# Patient Record
Sex: FEMALE | Race: BLACK OR AFRICAN AMERICAN | Employment: UNEMPLOYED | URBAN - METROPOLITAN AREA
[De-identification: names, ages, dates, MRNs, and addresses within clinical notes are randomized per-mention and may not be internally consistent; named-entity substitution may affect disease eponyms.]

---

## 2017-01-17 ENCOUNTER — HOSPITAL ENCOUNTER (OUTPATIENT)
Dept: LAB | Age: 62
Discharge: HOME | End: 2017-01-17
Payer: MEDICAID

## 2017-01-17 ENCOUNTER — HOSPITAL ENCOUNTER (OUTPATIENT)
Dept: LAB | Age: 62
Discharge: HOME | End: 2017-01-17

## 2017-01-17 DIAGNOSIS — Z01.818 PRE-OP EXAMINATION: ICD-10-CM

## 2017-01-17 LAB — SENTARA SPECIMEN COL,SENBCF: NORMAL

## 2017-01-17 PROCEDURE — 93005 ELECTROCARDIOGRAM TRACING: CPT

## 2017-01-17 PROCEDURE — 99001 SPECIMEN HANDLING PT-LAB: CPT | Performed by: ORTHOPAEDIC SURGERY

## 2017-01-17 NOTE — PERIOP NOTES
PAT - SURGICAL PRE-ADMISSION INSTRUCTIONS    NAME:  Mildred Peñaloza                                                          TODAY'S DATE:  1/17/2017    SURGERY DATE:  2/6/2017                                  SURGERY ARRIVAL TIME: 1030    1. Do NOT eat or drink anything, including candy or gum, after MIDNIGHT on [id number] , unless you have specific instructions from your Surgeon or Anesthesia Provider to do so. 2. No smoking on the day of surgery. 3. No alcohol 24 hours prior to the day of surgery. 4. No recreational drugs for one week prior to the day of surgery. 5. Leave all valuables, including money/purse, at home. 6. Remove all jewelry, nail polish, makeup (including mascara); no lotions, powders, deodorant, or perfume/cologne/after shave. 7. Glasses/Contact lenses and Dentures may be worn to the hospital.  They will be removed prior to surgery. 8. Call your doctor if symptoms of a cold or illness develop within 24 ours prior to surgery. 9. AN ADULT MUST DRIVE YOU HOME AFTER OUTPATIENT SURGERY. 10. If you are having an OUTPATIENT procedure, please make arrangements for a responsible adult to be with you for 24 hours after your surgery. 11. If you are admitted to the hospital, you will be assigned to a bed after surgery is complete. Normally a family member will not be able to see you until you are in your assigned bed. 15. Family is encouraged to accompany you to the hospital.  We ask visitors in the treatment area to be limited to ONE person at a time to ensure patient privacy. EXCEPTIONS WILL BE MADE AS NEEDED. 15. Children under 12 are discouraged from entering the treatment area and need to be supervised by an adult when in the waiting room. Special Instructions:    Bring list of CURRENT medications . , Take these medications the morning of surgery with a sip of water:  BLOOD PRESSURE / HEART MEDS, HOLD oral diabetic medication on the MORNING OF surgery. , HOLD metformin/glucophage dose starting the [address] the day of surgery. , Complete bowel prep per MD instructions., STOP anticoagulants AT LEAST 1 WEEK PRIOR to your surgery or, follow other MD instructions:  BLOOD THINNERS, HEART MEDS    Patient Prep:    use CHG solution    These surgical instructions were reviewed with PATIENT during the PAT PHONE VISIT . A printed copy of the instructions was provided to PATIENT. Directions: On the morning of surgery, please go to the [address] [place] [address]. Enter the building from the [facility] entrance, 1st floor (next to the Emergency Room entrance). Take the elevator to the 2nd floor. Sign in at the Registration Desk.     If you have any questions and/or concerns, please do not hesitate to call:  (Prior to the day of surgery)  [facility] unit:  [phone]  (Day of surgery)  [facility] unit:  [phone]

## 2017-01-18 LAB
ATRIAL RATE: 82 BPM
CALCULATED P AXIS, ECG09: 58 DEGREES
CALCULATED R AXIS, ECG10: -12 DEGREES
CALCULATED T AXIS, ECG11: 23 DEGREES
DIAGNOSIS, 93000: NORMAL
P-R INTERVAL, ECG05: 128 MS
Q-T INTERVAL, ECG07: 362 MS
QRS DURATION, ECG06: 86 MS
QTC CALCULATION (BEZET), ECG08: 422 MS
VENTRICULAR RATE, ECG03: 82 BPM

## 2017-02-03 ENCOUNTER — ANESTHESIA EVENT (OUTPATIENT)
Dept: SURGERY | Age: 62
DRG: 301 | End: 2017-02-03
Payer: MEDICAID

## 2017-02-06 ENCOUNTER — HOSPITAL ENCOUNTER (INPATIENT)
Age: 62
LOS: 2 days | Discharge: NURSING, REHAB OR LONG-TERM CARE | DRG: 301 | End: 2017-02-08
Attending: ORTHOPAEDIC SURGERY | Admitting: ORTHOPAEDIC SURGERY
Payer: MEDICAID

## 2017-02-06 ENCOUNTER — APPOINTMENT (OUTPATIENT)
Dept: GENERAL RADIOLOGY | Age: 62
DRG: 301 | End: 2017-02-06
Attending: ORTHOPAEDIC SURGERY
Payer: MEDICAID

## 2017-02-06 ENCOUNTER — ANESTHESIA (OUTPATIENT)
Dept: SURGERY | Age: 62
DRG: 301 | End: 2017-02-06
Payer: MEDICAID

## 2017-02-06 PROBLEM — M16.12 OSTEOARTHRITIS OF LEFT HIP: Status: ACTIVE | Noted: 2017-02-06

## 2017-02-06 PROBLEM — E78.5 HYPERLIPIDEMIA: Chronic | Status: ACTIVE | Noted: 2017-02-06

## 2017-02-06 PROBLEM — I10 HTN (HYPERTENSION): Chronic | Status: ACTIVE | Noted: 2017-02-06

## 2017-02-06 PROBLEM — F32.A DEPRESSION: Chronic | Status: ACTIVE | Noted: 2017-02-06

## 2017-02-06 PROBLEM — E11.9 TYPE 2 DIABETES MELLITUS (HCC): Chronic | Status: ACTIVE | Noted: 2017-02-06

## 2017-02-06 PROBLEM — J45.909 ASTHMA: Chronic | Status: ACTIVE | Noted: 2017-02-06

## 2017-02-06 LAB
ABO + RH BLD: NORMAL
ANION GAP BLD CALC-SCNC: 10 MMOL/L (ref 3–18)
APTT PPP: 27.9 SEC (ref 23–36.4)
BASOPHILS # BLD AUTO: 0.1 K/UL (ref 0–0.06)
BASOPHILS # BLD: 0 % (ref 0–2)
BLOOD GROUP ANTIBODIES SERPL: NORMAL
BUN SERPL-MCNC: 17 MG/DL (ref 7–18)
BUN/CREAT SERPL: 24 (ref 12–20)
CALCIUM SERPL-MCNC: 10.2 MG/DL (ref 8.5–10.1)
CHLORIDE SERPL-SCNC: 108 MMOL/L (ref 100–108)
CO2 SERPL-SCNC: 25 MMOL/L (ref 21–32)
CREAT SERPL-MCNC: 0.71 MG/DL (ref 0.6–1.3)
DIFFERENTIAL METHOD BLD: ABNORMAL
EOSINOPHIL # BLD: 0.8 K/UL (ref 0–0.4)
EOSINOPHIL NFR BLD: 5 % (ref 0–5)
ERYTHROCYTE [DISTWIDTH] IN BLOOD BY AUTOMATED COUNT: 14.4 % (ref 11.6–14.5)
EST. AVERAGE GLUCOSE BLD GHB EST-MCNC: 140 MG/DL
GLUCOSE BLD STRIP.AUTO-MCNC: 126 MG/DL (ref 70–110)
GLUCOSE BLD STRIP.AUTO-MCNC: 133 MG/DL (ref 70–110)
GLUCOSE BLD STRIP.AUTO-MCNC: 147 MG/DL (ref 70–110)
GLUCOSE SERPL-MCNC: 108 MG/DL (ref 74–99)
HBA1C MFR BLD: 6.5 % (ref 4.2–5.6)
HCT VFR BLD AUTO: 38.3 % (ref 35–45)
HGB BLD-MCNC: 12.4 G/DL (ref 12–16)
INR PPP: 0.9 (ref 0.8–1.2)
LYMPHOCYTES # BLD AUTO: 13 % (ref 21–52)
LYMPHOCYTES # BLD: 2 K/UL (ref 0.9–3.6)
MCH RBC QN AUTO: 30.4 PG (ref 24–34)
MCHC RBC AUTO-ENTMCNC: 32.4 G/DL (ref 31–37)
MCV RBC AUTO: 93.9 FL (ref 74–97)
MONOCYTES # BLD: 0.5 K/UL (ref 0.05–1.2)
MONOCYTES NFR BLD AUTO: 3 % (ref 3–10)
NEUTS SEG # BLD: 12.8 K/UL (ref 1.8–8)
NEUTS SEG NFR BLD AUTO: 79 % (ref 40–73)
PLATELET # BLD AUTO: 296 K/UL (ref 135–420)
PMV BLD AUTO: 10.9 FL (ref 9.2–11.8)
POTASSIUM SERPL-SCNC: 4.2 MMOL/L (ref 3.5–5.5)
PROTHROMBIN TIME: 12.2 SEC (ref 11.5–15.2)
RBC # BLD AUTO: 4.08 M/UL (ref 4.2–5.3)
SODIUM SERPL-SCNC: 143 MMOL/L (ref 136–145)
SPECIMEN EXP DATE BLD: NORMAL
WBC # BLD AUTO: 16.1 K/UL (ref 4.6–13.2)

## 2017-02-06 PROCEDURE — C1776 JOINT DEVICE (IMPLANTABLE): HCPCS | Performed by: ORTHOPAEDIC SURGERY

## 2017-02-06 PROCEDURE — 77030020788: Performed by: ORTHOPAEDIC SURGERY

## 2017-02-06 PROCEDURE — 76060000035 HC ANESTHESIA 2 TO 2.5 HR: Performed by: ORTHOPAEDIC SURGERY

## 2017-02-06 PROCEDURE — 74011000258 HC RX REV CODE- 258

## 2017-02-06 PROCEDURE — 77030008462 HC STPLR SKN PROX J&J -A: Performed by: ORTHOPAEDIC SURGERY

## 2017-02-06 PROCEDURE — 74011250636 HC RX REV CODE- 250/636: Performed by: ORTHOPAEDIC SURGERY

## 2017-02-06 PROCEDURE — 85025 COMPLETE CBC W/AUTO DIFF WBC: CPT | Performed by: ORTHOPAEDIC SURGERY

## 2017-02-06 PROCEDURE — 86900 BLOOD TYPING SEROLOGIC ABO: CPT | Performed by: ORTHOPAEDIC SURGERY

## 2017-02-06 PROCEDURE — 74011250637 HC RX REV CODE- 250/637: Performed by: ORTHOPAEDIC SURGERY

## 2017-02-06 PROCEDURE — 76210000016 HC OR PH I REC 1 TO 1.5 HR: Performed by: ORTHOPAEDIC SURGERY

## 2017-02-06 PROCEDURE — 74011250636 HC RX REV CODE- 250/636: Performed by: ANESTHESIOLOGY

## 2017-02-06 PROCEDURE — 77030032490 HC SLV COMPR SCD KNE COVD -B: Performed by: ORTHOPAEDIC SURGERY

## 2017-02-06 PROCEDURE — 74011250636 HC RX REV CODE- 250/636: Performed by: NURSE ANESTHETIST, CERTIFIED REGISTERED

## 2017-02-06 PROCEDURE — 85730 THROMBOPLASTIN TIME PARTIAL: CPT | Performed by: ORTHOPAEDIC SURGERY

## 2017-02-06 PROCEDURE — 77030018836 HC SOL IRR NACL ICUM -A: Performed by: ORTHOPAEDIC SURGERY

## 2017-02-06 PROCEDURE — 74011000250 HC RX REV CODE- 250

## 2017-02-06 PROCEDURE — 77030013708 HC HNDPC SUC IRR PULS STRY –B: Performed by: ORTHOPAEDIC SURGERY

## 2017-02-06 PROCEDURE — 74011000250 HC RX REV CODE- 250: Performed by: ORTHOPAEDIC SURGERY

## 2017-02-06 PROCEDURE — 80048 BASIC METABOLIC PNL TOTAL CA: CPT | Performed by: ORTHOPAEDIC SURGERY

## 2017-02-06 PROCEDURE — 74011250637 HC RX REV CODE- 250/637

## 2017-02-06 PROCEDURE — 77030013079 HC BLNKT BAIR HGGR 3M -A: Performed by: ANESTHESIOLOGY

## 2017-02-06 PROCEDURE — 77030019908 HC STETH ESOPH SIMS -A: Performed by: ANESTHESIOLOGY

## 2017-02-06 PROCEDURE — 77030018883 HC BLD SAW SAG4 STRY -B: Performed by: ORTHOPAEDIC SURGERY

## 2017-02-06 PROCEDURE — 83036 HEMOGLOBIN GLYCOSYLATED A1C: CPT | Performed by: PHYSICIAN ASSISTANT

## 2017-02-06 PROCEDURE — 74011250636 HC RX REV CODE- 250/636

## 2017-02-06 PROCEDURE — 77030016547 HC BLD SAW SAG1 STRY -B: Performed by: ORTHOPAEDIC SURGERY

## 2017-02-06 PROCEDURE — 77030029684 HC NEB SM VOL KT MONA -A

## 2017-02-06 PROCEDURE — 65270000029 HC RM PRIVATE

## 2017-02-06 PROCEDURE — 77030027138 HC INCENT SPIROMETER -A

## 2017-02-06 PROCEDURE — 36415 COLL VENOUS BLD VENIPUNCTURE: CPT | Performed by: ORTHOPAEDIC SURGERY

## 2017-02-06 PROCEDURE — 74011250637 HC RX REV CODE- 250/637: Performed by: NURSE ANESTHETIST, CERTIFIED REGISTERED

## 2017-02-06 PROCEDURE — 77030008683 HC TU ET CUF COVD -A: Performed by: ANESTHESIOLOGY

## 2017-02-06 PROCEDURE — 85610 PROTHROMBIN TIME: CPT | Performed by: ORTHOPAEDIC SURGERY

## 2017-02-06 PROCEDURE — 82962 GLUCOSE BLOOD TEST: CPT

## 2017-02-06 PROCEDURE — 74011250637 HC RX REV CODE- 250/637: Performed by: PHYSICIAN ASSISTANT

## 2017-02-06 PROCEDURE — 77030019880 HC ABD PLLW HIP DJOR -B: Performed by: ORTHOPAEDIC SURGERY

## 2017-02-06 PROCEDURE — 77030003029 HC SUT VCRL J&J -B: Performed by: ORTHOPAEDIC SURGERY

## 2017-02-06 PROCEDURE — 77010033678 HC OXYGEN DAILY

## 2017-02-06 PROCEDURE — 72170 X-RAY EXAM OF PELVIS: CPT

## 2017-02-06 PROCEDURE — 77030008477 HC STYL SATN SLP COVD -A: Performed by: ANESTHESIOLOGY

## 2017-02-06 PROCEDURE — 74011000250 HC RX REV CODE- 250: Performed by: PHYSICIAN ASSISTANT

## 2017-02-06 PROCEDURE — 76010000171 HC OR TIME 2 TO 2.5 HR INTENSV-TIER 1: Performed by: ORTHOPAEDIC SURGERY

## 2017-02-06 PROCEDURE — 77030020255 HC SOL INJ LR 1000ML BG

## 2017-02-06 PROCEDURE — 0SRB0JZ REPLACEMENT OF LEFT HIP JOINT WITH SYNTHETIC SUBSTITUTE, OPEN APPROACH: ICD-10-PCS | Performed by: ORTHOPAEDIC SURGERY

## 2017-02-06 DEVICE — 127 DEGREE NECK ANGLE HIP STEM
Type: IMPLANTABLE DEVICE | Site: HIP | Status: FUNCTIONAL
Brand: ACCOLADE

## 2017-02-06 DEVICE — CERAMIC V40 FEMORAL HEAD
Type: IMPLANTABLE DEVICE | Site: HIP | Status: FUNCTIONAL
Brand: BIOLOX

## 2017-02-06 DEVICE — 0 DEGREE POLYETHYLENE INSERT
Type: IMPLANTABLE DEVICE | Site: HIP | Status: FUNCTIONAL
Brand: TRIDENT

## 2017-02-06 DEVICE — COMPONENT HIP PRSS FT MTL ON CERM POLYETH X3: Type: IMPLANTABLE DEVICE | Site: HIP | Status: FUNCTIONAL

## 2017-02-06 DEVICE — HEMISPHERICAL CLUSTER HOLE SHELL
Type: IMPLANTABLE DEVICE | Site: HIP | Status: FUNCTIONAL
Brand: TRITANIUM

## 2017-02-06 DEVICE — CANCELLOUS BONE SCREW
Type: IMPLANTABLE DEVICE | Site: HIP | Status: FUNCTIONAL
Brand: TORX

## 2017-02-06 RX ORDER — ALBUTEROL SULFATE 0.83 MG/ML
2.5 SOLUTION RESPIRATORY (INHALATION)
Status: COMPLETED | OUTPATIENT
Start: 2017-02-06 | End: 2017-02-06

## 2017-02-06 RX ORDER — CEFAZOLIN SODIUM 2 G/50ML
2 SOLUTION INTRAVENOUS
Status: COMPLETED | OUTPATIENT
Start: 2017-02-06 | End: 2017-02-06

## 2017-02-06 RX ORDER — FENTANYL CITRATE 50 UG/ML
25 INJECTION, SOLUTION INTRAMUSCULAR; INTRAVENOUS AS NEEDED
Status: DISCONTINUED | OUTPATIENT
Start: 2017-02-06 | End: 2017-02-06 | Stop reason: HOSPADM

## 2017-02-06 RX ORDER — HYDROMORPHONE HYDROCHLORIDE 1 MG/ML
INJECTION, SOLUTION INTRAMUSCULAR; INTRAVENOUS; SUBCUTANEOUS AS NEEDED
Status: DISCONTINUED | OUTPATIENT
Start: 2017-02-06 | End: 2017-02-06 | Stop reason: HOSPADM

## 2017-02-06 RX ORDER — SERTRALINE HYDROCHLORIDE 50 MG/1
100 TABLET, FILM COATED ORAL DAILY
Status: DISCONTINUED | OUTPATIENT
Start: 2017-02-07 | End: 2017-02-08 | Stop reason: HOSPADM

## 2017-02-06 RX ORDER — DIPHENHYDRAMINE HCL 25 MG
25 CAPSULE ORAL
Status: DISCONTINUED | OUTPATIENT
Start: 2017-02-06 | End: 2017-02-08 | Stop reason: HOSPADM

## 2017-02-06 RX ORDER — ACETAMINOPHEN 325 MG/1
650 TABLET ORAL
Status: DISCONTINUED | OUTPATIENT
Start: 2017-02-06 | End: 2017-02-08 | Stop reason: HOSPADM

## 2017-02-06 RX ORDER — OXYCODONE AND ACETAMINOPHEN 5; 325 MG/1; MG/1
1-2 TABLET ORAL
Status: DISCONTINUED | OUTPATIENT
Start: 2017-02-06 | End: 2017-02-08 | Stop reason: HOSPADM

## 2017-02-06 RX ORDER — NEOSTIGMINE METHYLSULFATE 5 MG/5 ML
SYRINGE (ML) INTRAVENOUS AS NEEDED
Status: DISCONTINUED | OUTPATIENT
Start: 2017-02-06 | End: 2017-02-06 | Stop reason: HOSPADM

## 2017-02-06 RX ORDER — GLYCOPYRROLATE 0.2 MG/ML
INJECTION INTRAMUSCULAR; INTRAVENOUS AS NEEDED
Status: DISCONTINUED | OUTPATIENT
Start: 2017-02-06 | End: 2017-02-06 | Stop reason: HOSPADM

## 2017-02-06 RX ORDER — ROCURONIUM BROMIDE 10 MG/ML
INJECTION, SOLUTION INTRAVENOUS AS NEEDED
Status: DISCONTINUED | OUTPATIENT
Start: 2017-02-06 | End: 2017-02-06 | Stop reason: HOSPADM

## 2017-02-06 RX ORDER — LABETALOL HCL 20 MG/4 ML
SYRINGE (ML) INTRAVENOUS AS NEEDED
Status: DISCONTINUED | OUTPATIENT
Start: 2017-02-06 | End: 2017-02-06 | Stop reason: HOSPADM

## 2017-02-06 RX ORDER — BISACODYL 5 MG
5 TABLET, DELAYED RELEASE (ENTERIC COATED) ORAL DAILY PRN
Status: DISCONTINUED | OUTPATIENT
Start: 2017-02-06 | End: 2017-02-08 | Stop reason: HOSPADM

## 2017-02-06 RX ORDER — FAMOTIDINE 20 MG/1
20 TABLET, FILM COATED ORAL ONCE
Status: COMPLETED | OUTPATIENT
Start: 2017-02-06 | End: 2017-02-06

## 2017-02-06 RX ORDER — SODIUM CHLORIDE, SODIUM LACTATE, POTASSIUM CHLORIDE, CALCIUM CHLORIDE 600; 310; 30; 20 MG/100ML; MG/100ML; MG/100ML; MG/100ML
50 INJECTION, SOLUTION INTRAVENOUS CONTINUOUS
Status: DISCONTINUED | OUTPATIENT
Start: 2017-02-06 | End: 2017-02-06 | Stop reason: HOSPADM

## 2017-02-06 RX ORDER — MORPHINE SULFATE 2 MG/ML
2 INJECTION, SOLUTION INTRAMUSCULAR; INTRAVENOUS
Status: DISCONTINUED | OUTPATIENT
Start: 2017-02-06 | End: 2017-02-08 | Stop reason: HOSPADM

## 2017-02-06 RX ORDER — SIMVASTATIN 20 MG/1
20 TABLET, FILM COATED ORAL
Status: DISCONTINUED | OUTPATIENT
Start: 2017-02-06 | End: 2017-02-08 | Stop reason: HOSPADM

## 2017-02-06 RX ORDER — AMLODIPINE BESYLATE 5 MG/1
5 TABLET ORAL DAILY
Status: DISCONTINUED | OUTPATIENT
Start: 2017-02-07 | End: 2017-02-08 | Stop reason: HOSPADM

## 2017-02-06 RX ORDER — CEFAZOLIN SODIUM 2 G/50ML
SOLUTION INTRAVENOUS
Status: COMPLETED
Start: 2017-02-06 | End: 2017-02-06

## 2017-02-06 RX ORDER — SODIUM CHLORIDE 0.9 % (FLUSH) 0.9 %
5-10 SYRINGE (ML) INJECTION AS NEEDED
Status: DISCONTINUED | OUTPATIENT
Start: 2017-02-06 | End: 2017-02-06 | Stop reason: HOSPADM

## 2017-02-06 RX ORDER — IPRATROPIUM BROMIDE AND ALBUTEROL SULFATE 2.5; .5 MG/3ML; MG/3ML
3 SOLUTION RESPIRATORY (INHALATION)
Status: DISCONTINUED | OUTPATIENT
Start: 2017-02-06 | End: 2017-02-07

## 2017-02-06 RX ORDER — ALBUTEROL SULFATE 90 UG/1
2 AEROSOL, METERED RESPIRATORY (INHALATION)
COMMUNITY

## 2017-02-06 RX ORDER — BUSPIRONE HYDROCHLORIDE 5 MG/1
30 TABLET ORAL DAILY
Status: DISCONTINUED | OUTPATIENT
Start: 2017-02-07 | End: 2017-02-08 | Stop reason: HOSPADM

## 2017-02-06 RX ORDER — NALOXONE HYDROCHLORIDE 0.4 MG/ML
0.4 INJECTION, SOLUTION INTRAMUSCULAR; INTRAVENOUS; SUBCUTANEOUS AS NEEDED
Status: DISCONTINUED | OUTPATIENT
Start: 2017-02-06 | End: 2017-02-08 | Stop reason: HOSPADM

## 2017-02-06 RX ORDER — ONDANSETRON 2 MG/ML
4 INJECTION INTRAMUSCULAR; INTRAVENOUS
Status: DISCONTINUED | OUTPATIENT
Start: 2017-02-06 | End: 2017-02-08 | Stop reason: HOSPADM

## 2017-02-06 RX ORDER — LIDOCAINE HYDROCHLORIDE 20 MG/ML
INJECTION, SOLUTION EPIDURAL; INFILTRATION; INTRACAUDAL; PERINEURAL AS NEEDED
Status: DISCONTINUED | OUTPATIENT
Start: 2017-02-06 | End: 2017-02-06 | Stop reason: HOSPADM

## 2017-02-06 RX ORDER — FENTANYL CITRATE 50 UG/ML
INJECTION, SOLUTION INTRAMUSCULAR; INTRAVENOUS AS NEEDED
Status: DISCONTINUED | OUTPATIENT
Start: 2017-02-06 | End: 2017-02-06 | Stop reason: HOSPADM

## 2017-02-06 RX ORDER — BUDESONIDE 0.5 MG/2ML
500 INHALANT ORAL
Status: DISCONTINUED | OUTPATIENT
Start: 2017-02-06 | End: 2017-02-08 | Stop reason: HOSPADM

## 2017-02-06 RX ORDER — PERPHENAZINE 2 MG/1
4 TABLET, FILM COATED ORAL 2 TIMES DAILY
Status: DISCONTINUED | OUTPATIENT
Start: 2017-02-06 | End: 2017-02-08 | Stop reason: HOSPADM

## 2017-02-06 RX ORDER — CELECOXIB 100 MG/1
200 CAPSULE ORAL 2 TIMES DAILY
Status: DISCONTINUED | OUTPATIENT
Start: 2017-02-06 | End: 2017-02-08 | Stop reason: HOSPADM

## 2017-02-06 RX ORDER — INSULIN LISPRO 100 [IU]/ML
INJECTION, SOLUTION INTRAVENOUS; SUBCUTANEOUS ONCE
Status: DISCONTINUED | OUTPATIENT
Start: 2017-02-06 | End: 2017-02-06 | Stop reason: HOSPADM

## 2017-02-06 RX ORDER — MORPHINE SULFATE 10 MG/ML
INJECTION, SOLUTION INTRAMUSCULAR; INTRAVENOUS
Status: COMPLETED
Start: 2017-02-06 | End: 2017-02-06

## 2017-02-06 RX ORDER — HYDROMORPHONE HYDROCHLORIDE 2 MG/ML
0.5 INJECTION, SOLUTION INTRAMUSCULAR; INTRAVENOUS; SUBCUTANEOUS
Status: DISCONTINUED | OUTPATIENT
Start: 2017-02-06 | End: 2017-02-06 | Stop reason: HOSPADM

## 2017-02-06 RX ORDER — SODIUM CHLORIDE 0.9 % (FLUSH) 0.9 %
5-10 SYRINGE (ML) INJECTION AS NEEDED
Status: DISCONTINUED | OUTPATIENT
Start: 2017-02-06 | End: 2017-02-08 | Stop reason: HOSPADM

## 2017-02-06 RX ORDER — DOCUSATE SODIUM 100 MG/1
100 CAPSULE, LIQUID FILLED ORAL 2 TIMES DAILY
Status: DISCONTINUED | OUTPATIENT
Start: 2017-02-06 | End: 2017-02-08 | Stop reason: HOSPADM

## 2017-02-06 RX ORDER — SUCCINYLCHOLINE CHLORIDE 20 MG/ML
INJECTION INTRAMUSCULAR; INTRAVENOUS AS NEEDED
Status: DISCONTINUED | OUTPATIENT
Start: 2017-02-06 | End: 2017-02-06 | Stop reason: HOSPADM

## 2017-02-06 RX ORDER — SODIUM CHLORIDE, SODIUM LACTATE, POTASSIUM CHLORIDE, CALCIUM CHLORIDE 600; 310; 30; 20 MG/100ML; MG/100ML; MG/100ML; MG/100ML
75 INJECTION, SOLUTION INTRAVENOUS CONTINUOUS
Status: DISPENSED | OUTPATIENT
Start: 2017-02-06 | End: 2017-02-07

## 2017-02-06 RX ORDER — ALBUTEROL SULFATE 0.83 MG/ML
SOLUTION RESPIRATORY (INHALATION)
Status: COMPLETED
Start: 2017-02-06 | End: 2017-02-06

## 2017-02-06 RX ORDER — MAGNESIUM SULFATE 100 %
4 CRYSTALS MISCELLANEOUS AS NEEDED
Status: DISCONTINUED | OUTPATIENT
Start: 2017-02-06 | End: 2017-02-08 | Stop reason: HOSPADM

## 2017-02-06 RX ORDER — DIPHENHYDRAMINE HYDROCHLORIDE 50 MG/ML
12.5 INJECTION, SOLUTION INTRAMUSCULAR; INTRAVENOUS
Status: DISCONTINUED | OUTPATIENT
Start: 2017-02-06 | End: 2017-02-08 | Stop reason: HOSPADM

## 2017-02-06 RX ORDER — THEOPHYLLINE 300 MG/1
300 TABLET, EXTENDED RELEASE ORAL 2 TIMES DAILY
Status: DISCONTINUED | OUTPATIENT
Start: 2017-02-06 | End: 2017-02-07 | Stop reason: SDUPTHER

## 2017-02-06 RX ORDER — ONDANSETRON 2 MG/ML
4 INJECTION INTRAMUSCULAR; INTRAVENOUS ONCE
Status: DISCONTINUED | OUTPATIENT
Start: 2017-02-06 | End: 2017-02-06 | Stop reason: HOSPADM

## 2017-02-06 RX ORDER — ENOXAPARIN SODIUM 100 MG/ML
40 INJECTION SUBCUTANEOUS DAILY
Status: DISCONTINUED | OUTPATIENT
Start: 2017-02-06 | End: 2017-02-08 | Stop reason: HOSPADM

## 2017-02-06 RX ORDER — BUPIVACAINE HYDROCHLORIDE AND EPINEPHRINE 2.5; 5 MG/ML; UG/ML
INJECTION, SOLUTION EPIDURAL; INFILTRATION; INTRACAUDAL; PERINEURAL AS NEEDED
Status: DISCONTINUED | OUTPATIENT
Start: 2017-02-06 | End: 2017-02-06 | Stop reason: HOSPADM

## 2017-02-06 RX ORDER — CEFAZOLIN SODIUM 2 G/50ML
2 SOLUTION INTRAVENOUS EVERY 8 HOURS
Status: DISPENSED | OUTPATIENT
Start: 2017-02-06 | End: 2017-02-07

## 2017-02-06 RX ORDER — DEXTROSE 50 % IN WATER (D50W) INTRAVENOUS SYRINGE
25-50 AS NEEDED
Status: DISCONTINUED | OUTPATIENT
Start: 2017-02-06 | End: 2017-02-06 | Stop reason: HOSPADM

## 2017-02-06 RX ORDER — ARFORMOTEROL TARTRATE 15 UG/2ML
2 SOLUTION RESPIRATORY (INHALATION) EVERY 12 HOURS
Status: DISCONTINUED | OUTPATIENT
Start: 2017-02-06 | End: 2017-02-08 | Stop reason: HOSPADM

## 2017-02-06 RX ORDER — ONDANSETRON 2 MG/ML
INJECTION INTRAMUSCULAR; INTRAVENOUS AS NEEDED
Status: DISCONTINUED | OUTPATIENT
Start: 2017-02-06 | End: 2017-02-06 | Stop reason: HOSPADM

## 2017-02-06 RX ORDER — SODIUM CHLORIDE, SODIUM LACTATE, POTASSIUM CHLORIDE, CALCIUM CHLORIDE 600; 310; 30; 20 MG/100ML; MG/100ML; MG/100ML; MG/100ML
25 INJECTION, SOLUTION INTRAVENOUS CONTINUOUS
Status: DISCONTINUED | OUTPATIENT
Start: 2017-02-06 | End: 2017-02-06 | Stop reason: HOSPADM

## 2017-02-06 RX ORDER — SODIUM CHLORIDE 0.9 % (FLUSH) 0.9 %
5-10 SYRINGE (ML) INJECTION EVERY 8 HOURS
Status: DISCONTINUED | OUTPATIENT
Start: 2017-02-06 | End: 2017-02-08 | Stop reason: HOSPADM

## 2017-02-06 RX ORDER — MIDAZOLAM HYDROCHLORIDE 1 MG/ML
INJECTION, SOLUTION INTRAMUSCULAR; INTRAVENOUS AS NEEDED
Status: DISCONTINUED | OUTPATIENT
Start: 2017-02-06 | End: 2017-02-06 | Stop reason: HOSPADM

## 2017-02-06 RX ORDER — INSULIN LISPRO 100 [IU]/ML
INJECTION, SOLUTION INTRAVENOUS; SUBCUTANEOUS
Status: DISCONTINUED | OUTPATIENT
Start: 2017-02-06 | End: 2017-02-08 | Stop reason: HOSPADM

## 2017-02-06 RX ORDER — MAGNESIUM SULFATE 100 %
4 CRYSTALS MISCELLANEOUS AS NEEDED
Status: DISCONTINUED | OUTPATIENT
Start: 2017-02-06 | End: 2017-02-06 | Stop reason: HOSPADM

## 2017-02-06 RX ORDER — ALLOPURINOL 100 MG/1
100 TABLET ORAL DAILY
Status: DISCONTINUED | OUTPATIENT
Start: 2017-02-07 | End: 2017-02-08 | Stop reason: HOSPADM

## 2017-02-06 RX ORDER — MORPHINE SULFATE 2 MG/ML
2 INJECTION, SOLUTION INTRAMUSCULAR; INTRAVENOUS
Status: DISCONTINUED | OUTPATIENT
Start: 2017-02-06 | End: 2017-02-06 | Stop reason: HOSPADM

## 2017-02-06 RX ORDER — PROPOFOL 10 MG/ML
INJECTION, EMULSION INTRAVENOUS AS NEEDED
Status: DISCONTINUED | OUTPATIENT
Start: 2017-02-06 | End: 2017-02-06 | Stop reason: HOSPADM

## 2017-02-06 RX ORDER — DEXTROSE 50 % IN WATER (D50W) INTRAVENOUS SYRINGE
25-50 AS NEEDED
Status: DISCONTINUED | OUTPATIENT
Start: 2017-02-06 | End: 2017-02-08 | Stop reason: HOSPADM

## 2017-02-06 RX ORDER — PERPHENAZINE 4 MG/1
4 TABLET, FILM COATED ORAL 2 TIMES DAILY
Status: DISCONTINUED | OUTPATIENT
Start: 2017-02-06 | End: 2017-02-06

## 2017-02-06 RX ORDER — ZOLPIDEM TARTRATE 5 MG/1
5 TABLET ORAL
Status: DISCONTINUED | OUTPATIENT
Start: 2017-02-06 | End: 2017-02-08 | Stop reason: HOSPADM

## 2017-02-06 RX ORDER — ALBUTEROL SULFATE 90 UG/1
AEROSOL, METERED RESPIRATORY (INHALATION) AS NEEDED
Status: DISCONTINUED | OUTPATIENT
Start: 2017-02-06 | End: 2017-02-06 | Stop reason: HOSPADM

## 2017-02-06 RX ORDER — RANITIDINE 150 MG/1
150 TABLET, FILM COATED ORAL 2 TIMES DAILY
Status: DISCONTINUED | OUTPATIENT
Start: 2017-02-06 | End: 2017-02-08 | Stop reason: HOSPADM

## 2017-02-06 RX ADMIN — MORPHINE SULFATE 2 MG: 2 INJECTION, SOLUTION INTRAMUSCULAR; INTRAVENOUS at 12:23

## 2017-02-06 RX ADMIN — MORPHINE SULFATE 2 MG: 2 INJECTION, SOLUTION INTRAMUSCULAR; INTRAVENOUS at 21:36

## 2017-02-06 RX ADMIN — SODIUM CHLORIDE, SODIUM LACTATE, POTASSIUM CHLORIDE, AND CALCIUM CHLORIDE 75 ML/HR: 600; 310; 30; 20 INJECTION, SOLUTION INTRAVENOUS at 20:22

## 2017-02-06 RX ADMIN — ALBUTEROL SULFATE 2.5 MG: 0.83 SOLUTION RESPIRATORY (INHALATION) at 16:44

## 2017-02-06 RX ADMIN — ENOXAPARIN SODIUM 40 MG: 40 INJECTION SUBCUTANEOUS at 22:31

## 2017-02-06 RX ADMIN — RANITIDINE 150 MG: 150 TABLET, FILM COATED ORAL at 18:45

## 2017-02-06 RX ADMIN — HYDROMORPHONE HYDROCHLORIDE 1 MG: 1 INJECTION, SOLUTION INTRAMUSCULAR; INTRAVENOUS; SUBCUTANEOUS at 14:24

## 2017-02-06 RX ADMIN — MIDAZOLAM HYDROCHLORIDE 2 MG: 1 INJECTION, SOLUTION INTRAMUSCULAR; INTRAVENOUS at 13:50

## 2017-02-06 RX ADMIN — OXYCODONE HYDROCHLORIDE AND ACETAMINOPHEN 2 TABLET: 5; 325 TABLET ORAL at 18:45

## 2017-02-06 RX ADMIN — ARFORMOTEROL TARTRATE 15 MCG: 15 SOLUTION RESPIRATORY (INHALATION) at 21:01

## 2017-02-06 RX ADMIN — ONDANSETRON 4 MG: 2 INJECTION INTRAMUSCULAR; INTRAVENOUS at 15:39

## 2017-02-06 RX ADMIN — Medication 3 MG: at 15:50

## 2017-02-06 RX ADMIN — SIMVASTATIN 20 MG: 20 TABLET, FILM COATED ORAL at 22:23

## 2017-02-06 RX ADMIN — Medication 10 ML: at 18:51

## 2017-02-06 RX ADMIN — PROPOFOL 180 MG: 10 INJECTION, EMULSION INTRAVENOUS at 13:57

## 2017-02-06 RX ADMIN — FENTANYL CITRATE 50 MCG: 50 INJECTION, SOLUTION INTRAMUSCULAR; INTRAVENOUS at 14:27

## 2017-02-06 RX ADMIN — MORPHINE SULFATE 2 MG: 10 INJECTION INTRAMUSCULAR; INTRAVENOUS; SUBCUTANEOUS at 12:33

## 2017-02-06 RX ADMIN — FAMOTIDINE 20 MG: 20 TABLET ORAL at 11:50

## 2017-02-06 RX ADMIN — FENTANYL CITRATE 25 MCG: 50 INJECTION, SOLUTION INTRAMUSCULAR; INTRAVENOUS at 16:52

## 2017-02-06 RX ADMIN — ROCURONIUM BROMIDE 45 MG: 10 INJECTION, SOLUTION INTRAVENOUS at 14:03

## 2017-02-06 RX ADMIN — BUDESONIDE 500 MCG: 0.5 INHALANT RESPIRATORY (INHALATION) at 21:01

## 2017-02-06 RX ADMIN — ALBUTEROL SULFATE 2.5 MG: 2.5 SOLUTION RESPIRATORY (INHALATION) at 16:44

## 2017-02-06 RX ADMIN — ROCURONIUM BROMIDE 5 MG: 10 INJECTION, SOLUTION INTRAVENOUS at 13:57

## 2017-02-06 RX ADMIN — FENTANYL CITRATE 50 MCG: 50 INJECTION, SOLUTION INTRAMUSCULAR; INTRAVENOUS at 15:14

## 2017-02-06 RX ADMIN — CELECOXIB 200 MG: 100 CAPSULE ORAL at 18:45

## 2017-02-06 RX ADMIN — SUCCINYLCHOLINE CHLORIDE 100 MG: 20 INJECTION INTRAMUSCULAR; INTRAVENOUS at 13:45

## 2017-02-06 RX ADMIN — GLYCOPYRROLATE 0.4 MG: 0.2 INJECTION INTRAMUSCULAR; INTRAVENOUS at 15:50

## 2017-02-06 RX ADMIN — DOCUSATE SODIUM 100 MG: 100 CAPSULE, LIQUID FILLED ORAL at 18:45

## 2017-02-06 RX ADMIN — ALBUTEROL SULFATE 3 PUFF: 90 AEROSOL, METERED RESPIRATORY (INHALATION) at 15:54

## 2017-02-06 RX ADMIN — FENTANYL CITRATE 50 MCG: 50 INJECTION, SOLUTION INTRAMUSCULAR; INTRAVENOUS at 15:19

## 2017-02-06 RX ADMIN — FENTANYL CITRATE 25 MCG: 50 INJECTION, SOLUTION INTRAMUSCULAR; INTRAVENOUS at 16:32

## 2017-02-06 RX ADMIN — CEFAZOLIN SODIUM 2 G: 2 SOLUTION INTRAVENOUS at 18:45

## 2017-02-06 RX ADMIN — Medication 5 MG: at 14:44

## 2017-02-06 RX ADMIN — Medication 5 MG: at 15:44

## 2017-02-06 RX ADMIN — THEOPHYLLINE 300 MG: 200 TABLET, EXTENDED RELEASE ORAL at 22:23

## 2017-02-06 RX ADMIN — PERPHENAZINE 4 MG: 2 TABLET, FILM COATED ORAL at 23:56

## 2017-02-06 RX ADMIN — CEFAZOLIN SODIUM 2 G: 2 SOLUTION INTRAVENOUS at 14:01

## 2017-02-06 RX ADMIN — FENTANYL CITRATE 100 MCG: 50 INJECTION, SOLUTION INTRAMUSCULAR; INTRAVENOUS at 13:45

## 2017-02-06 RX ADMIN — SODIUM CHLORIDE, SODIUM LACTATE, POTASSIUM CHLORIDE, AND CALCIUM CHLORIDE 25 ML/HR: 600; 310; 30; 20 INJECTION, SOLUTION INTRAVENOUS at 11:48

## 2017-02-06 RX ADMIN — LIDOCAINE HYDROCHLORIDE 40 MG: 20 INJECTION, SOLUTION EPIDURAL; INFILTRATION; INTRACAUDAL; PERINEURAL at 13:45

## 2017-02-06 NOTE — H&P
Date of Surgery Update:  David Torres was seen and examined. There have been no significant clinical changes since the completion of the originally dated History and Physical.    Original H&P to be scanned into system. Risks, benefits and alternatives reviewed with Ms Panchito Lujan to her satisfactions. She confirms she is well informed and desires to proceed with left total hip replacement.     Signed By: Karri Kent MD     February 6, 2017 1:07 PM

## 2017-02-06 NOTE — ANESTHESIA PREPROCEDURE EVALUATION
Anesthetic History   No history of anesthetic complications            Review of Systems / Medical History  Patient summary reviewed and pertinent labs reviewed    Pulmonary            Asthma : well controlled       Neuro/Psych         Psychiatric history     Cardiovascular    Hypertension: well controlled                   GI/Hepatic/Renal  Within defined limits              Endo/Other    Diabetes: well controlled, type 2    Morbid obesity and arthritis     Other Findings   Comments: Current Smoker? YES       Elective Surgery? Yes       Abstained from smoking 24 hours prior to anesthesia? YES    Risk Factors for Postoperative nausea/vomiting:       History of postoperative nausea/vomiting? NO       Female? YES       Motion sickness? NO       Intended opioid administration for postoperative analgesia?   YES           Physical Exam    Airway  Mallampati: II  TM Distance: 4 - 6 cm  Neck ROM: normal range of motion   Mouth opening: Normal     Cardiovascular  Regular rate and rhythm,  S1 and S2 normal,  no murmur, click, rub, or gallop             Dental  No notable dental hx       Pulmonary                 Abdominal  Abdominal exam normal       Other Findings            Anesthetic Plan    ASA: 3  Anesthesia type: general          Induction: Intravenous  Anesthetic plan and risks discussed with: Patient

## 2017-02-06 NOTE — ROUTINE PROCESS
TRANSFER - IN REPORT:    Verbal report received from Fred Huertas RN(name) on [employer]  being received from [employer]) for routine post - op      Report consisted of patients Situation, Background, Assessment and   Recommendations(SBAR). Information from the following report(s) SBAR, Verbal, Kardex was reviewed with the receiving nurse. Opportunity for questions and clarification was provided. Assessment completed upon patients arrival to unit and care assumed.

## 2017-02-06 NOTE — BRIEF OP NOTE
BRIEF OPERATIVE NOTE    Date of Procedure: 2/6/2017   Preoperative Diagnosis: [id number] osteoarthirtis  Postoperative Diagnosis: [id number] osteoarthirtis    Procedure(s):  left total hip replacement - hardinge approach right lateral decubitus position  Surgeon(s) and Role:     * Emil Burgess MD - Primary            Surgical Staff:  Circ-1: Marta Munoz  Circ-Relief: Tanya Kan RN  Scrub Tech-1: Carlos Gearing  Surg Asst-1: Swati Clarity  Surg Asst-2: Larence Khmer  Event Time In   Incision Start 1428   Incision Close      Anesthesia: General   Estimated Blood Loss: 300cc  Specimens: * No specimens in log *   Findings: misshaped femoral head and osteophytes   Complications: none  Implants:   Implant Name Type Inv.  Item Serial No.  Lot No. LRB No. Used Action   SHELL TRITAN RAY CLSTR D 50MM --  - S000  SHELL TRITAN RAY CLSTR D 50MM --  000 [employer] HOW [id number] Left 1 Implanted   SCR BNE ACET CANC TRIDENT --  - S000  SCR BNE ACET CANC TRIDENT --  000 [employer] HOW [id number] Left 1 Implanted   INSERT ACET 0DEG 32MM D X3 --  - S000  INSERT ACET 0DEG 32MM D X3 --  000 [employer] HOW [id number] Left 1 Implanted   STEM FEM SZ 4 [id number] -- ACCOLADE II V40 - [id number]  STEM FEM SZ 4 [id number] -- ACCOLADE II V40  [employer] HOW [id number] Left 1 Implanted   HEAD FEM DELT V40 +0MM NK 32MM -- V40 BIOLOX - [id number]   HEAD FEM DELT V40 +0MM NK 32MM -- V40 BIOLOX   [employer] HOW [id number] Left 1 Implanted

## 2017-02-06 NOTE — CONSULTS
[facility]  Hospitalist Division    Consult Note    Patient: Bairon Edwards MRN: [id number]  [facility]: [id number]    YOB: 1955  Age: 64 y.o. Sex: female    DOA: 2/6/2017 LOS:  LOS: 0 days        Requesting Physician:  Dr. Willa Dodson  Reason for Consultation:  Medical Management    Chief Complaint: Left hip pain    Assessment/Plan     Patient Active Problem List   Diagnosis Code    Osteoarthritis of left hip M16.12    Asthma J45.909    Type 2 diabetes mellitus ([address] E11.9    HTN (hypertension) I10    Depression F32.9    Hyperlipidemia E78.5       A/P:  - Left hip osteoarthritis - s/p Left hip replacement. PRN pain control. Diet advancement and mobility per surgery team.   - Asthma/ COPD - Continue PRN Duo-Nebs/ Advair/ theophylline  - HTN - continue Norvasc  - DM - SSI for BS Control  - Depression - Continue Buspar/ Perphenazine/ Zoloft  - Hyperlipidemia - Continue Zocor  - Lovenox for DVT Prophylaxis      HPI:     Bairon Edwards is a 64 y.o. female with a hx of tobacco use, Asthma, DM, HTN, Chronic pain, Anxiety and depression who was admitted to [facility] on 2/6/17 after undergoing left total hip replacement for osteoarthritis. She is drowsy from anesthesia in PACU and provides limited history. She complains of long stand left hip pain. She denies any injury or trauma. She is wheezing in PACU and will be given an albuterol neb now. Her pain is relatively well controlled. The Hospitalist team has been consulted for medical management. Past Medical History   Diagnosis Date    Arthritis      all over the body    Asthma     Chronic left hip pain     Chronic pain     Diabetes ([address]     Hip pain, right     Hypertension     Morbid obesity ([address]     Psychiatric disorder      anxiety and depression       Past Surgical History   Procedure Laterality Date    Hx hernia repair      Hx hysterectomy      Hx wrist fracture tx         History reviewed.  No pertinent family history. Social History     Social History    Marital status: SINGLE     Spouse name: N/A    Number of children: N/A    Years of education: N/A     Social History Main Topics    Smoking status: Current Some Day Smoker     Packs/day: 1.50    Smokeless tobacco: Never Used    Alcohol use No    Drug use: No    Sexual activity: Not Currently     Other Topics Concern    None     Social History Narrative       Prior to Admission medications    Medication Sig Start Date End Date Taking? Authorizing Provider   albuterol (VENTOLIN HFA) 90 mcg/actuation inhaler Take  by inhalation. Yes Historical Provider   ibuprofen (MOTRIN) 800 mg tablet Take 800 mg by mouth. Yes Rosetta Fatima MD   diazepam (VALIUM) 10 mg tablet Take 10 mg by mouth every six (6) hours as needed for Anxiety. Yes Rosetta Fatima MD   fentaNYL (DURAGESIC) 50 mcg/hr PATCH 1 Patch by TransDERmal route every seventy-two (72) hours. Yes Rosetta Fatima MD   oxyCODONE-acetaminophen (PERCOCET 10)  mg per tablet Take 1 Tab by mouth every eight (8) hours as needed for Pain. Yes Rosetta Fatima MD   amLODIPine (NORVASC) 5 mg tablet Take 5 mg by mouth daily. Yes Rosetta Fatima MD   ranitidine (ZANTAC) 150 mg tablet Take 150 mg by mouth two (2) times a day. Yes Rosetta Fatima MD   cetirizine (ZYRTEC) 10 mg tablet Take  by mouth. Yes Rosetta Fatima MD   metFORMIN (GLUCOPHAGE) 1,000 mg tablet Take 1,000 mg by mouth two (2) times daily (with meals). Yes Rosetta Fatima MD   busPIRone (BUSPAR) 30 mg tablet Take 30 mg by mouth daily. Yes Rosetta Fatima MD   simvastatin (ZOCOR) 20 mg tablet Take 20 mg by mouth nightly. Yes Rosetta Fatima MD   theophylline ER,12 hour, (THEOCHRON) 300 mg tablet Take  by mouth two (2) times a day. Yes Rosetta Fatima MD   perphenazine (TRILAFON) 4 mg tablet Take 4 mg by mouth two (2) times a day. Yes Rosetta Fatima MD   sertraline (ZOLOFT) 100 mg tablet Take 100 mg by mouth daily.    Yes Rosetta Fatima MD   fluticasone-salmeterol (ADVAIR) 100-50 mcg/dose diskus inhaler Take 1 Puff by inhalation every twelve (12) hours. Yes Rosetta Fatima MD   allopurinol (ZYLOPRIM) 300 mg tablet Take 100 mg by mouth daily. Yes Rosetta Fatima MD   multivitamin, tx-iron-ca-min (THERA-M W/ IRON) 9 mg iron-400 mcg tab tablet Take 1 Tab by mouth daily. Yes Rosetta Fatima MD   diphenhydrAMINE (BENADRYL) 25 mg capsule Take 25 mg by mouth every six (6) hours as needed. Rosetta Fatima MD   nitroglycerin (NITROSTAT) 0.4 mg SL tablet 0.4 mg by SubLINGual route every five (5) minutes as needed for Chest Pain. Rosetta Fatima MD       No Known Allergies    Review of Systems  - fever, - chills, - fatigue, - weight loss, - night sweats   - sore throat, - sinus congestion, - lymphadenopathy, - vision changes  - CP, -  palpitations  - dyspnea on exertion, - dyspnea at rest, - cough, - hemoptysis  - nausea, - vomiting, - diarrhea, - abdominal pain, - reflux, - dysphagia  - dysuria, - hematuria, - urinary frequency  - rash, - pruritis  - back pain, - neck pain, - myalgia, + left hip pain  - H/A, - numbness, - tingling, - weakness, - slurred speech    Physical Exam:      Visit Vitals    /88    Pulse 86    Temp 97.7 °F (36.5 °C)    Resp 16    Ht 5' 3\" (1.6 m)    Wt 103.4 kg (228 lb)    SpO2 100%    BMI 40.39 kg/m2       Physical Exam:  Gen: In general, this is a well nourished female, in no acute distress on NC.  HEENT: Sclerae nonicteric. Oral mucous membranes moist.    Neck: Supple with midline trachea. CV: RRR without murmur or rub appreciated. Resp:Respirations are unlabored without use of accessory muscles. Lung fields bilaterally with expiratory wheezing. No rhonchi. Abd: Soft, nontender, nondistended. Extrem: Extremities are warm, without cyanosis or clubbing. No pitting pretibial edema. Palpable distal pulses X 4.   Skin: Warm, no visible rashes. Neuro: Patient is drowsy, but arouses, cooperative. No obvious focal defects.  Moves all 4 extremities. Labs Reviewed:    Recent Results (from the past 24 hour(s))   TYPE & SCREEN    Collection Time: 02/06/17 11:38 AM   Result Value Ref Range    Crossmatch Expiration 02/09/2017     ABO/Rh(D) [place] POSITIVE     Antibody screen NEG    GLUCOSE, POC    Collection Time: 02/06/17 11:48 AM   Result Value Ref Range    Glucose (POC) 126 (H) 70 - 110 mg/dL   CBC WITH AUTOMATED DIFF    Collection Time: 02/06/17 12:33 PM   Result Value Ref Range    WBC 16.1 (H) 4.6 - 13.2 K/uL    RBC 4.08 (L) 4.20 - 5.30 M/uL    HGB 12.4 12.0 - 16.0 g/dL    HCT 38.3 35.0 - 45.0 %    MCV 93.9 74.0 - 97.0 FL    MCH 30.4 24.0 - 34.0 PG    MCHC 32.4 31.0 - 37.0 g/dL    RDW 14.4 11.6 - 14.5 %    PLATELET 138 706 - 825 K/uL    MPV 10.9 9.2 - 11.8 FL    NEUTROPHILS 79 (H) 40 - 73 %    LYMPHOCYTES 13 (L) 21 - 52 %    MONOCYTES 3 3 - 10 %    EOSINOPHILS 5 0 - 5 %    BASOPHILS 0 0 - 2 %    ABS. NEUTROPHILS 12.8 (H) 1.8 - 8.0 K/UL    ABS. LYMPHOCYTES 2.0 0.9 - 3.6 K/UL    ABS. MONOCYTES 0.5 0.05 - 1.2 K/UL    ABS. EOSINOPHILS 0.8 (H) 0.0 - 0.4 K/UL    ABS. BASOPHILS 0.1 (H) 0.0 - 0.06 K/UL    DF AUTOMATED     METABOLIC PANEL, BASIC    Collection Time: 02/06/17 12:33 PM   Result Value Ref Range    Sodium 143 136 - 145 mmol/L    Potassium 4.2 3.5 - 5.5 mmol/L    Chloride 108 100 - 108 mmol/L    CO2 25 21 - 32 mmol/L    Anion gap 10 3.0 - 18 mmol/L    Glucose 108 (H) 74 - 99 mg/dL    BUN 17 7.0 - 18 MG/DL    Creatinine 0.71 0.6 - 1.3 MG/DL    BUN/Creatinine ratio 24 (H) 12 - 20      GFR est AA >60 >60 ml/min/1.73m2    GFR est non-AA >60 >60 ml/min/1.73m2    Calcium 10.2 (H) 8.5 - 10.1 MG/DL   PROTHROMBIN TIME + INR    Collection Time: 02/06/17 12:33 PM   Result Value Ref Range    Prothrombin time 12.2 11.5 - 15.2 sec    INR 0.9 0.8 - 1.2     PTT    Collection Time: 02/06/17 12:33 PM   Result Value Ref Range    aPTT 27.9 23.0 - 36.4 SEC       Imaging Reviewed:    None      [facility]  Group  Hospitalist Division  Pager:  [phone]  Office:  [phone]

## 2017-02-06 NOTE — PROGRESS NOTES
1720 Patient arrived to the unit at this time in stable condition. 1745 Patient is resting in the bed at this time. She complains of pain in her hip. Patient is drowsy at this time. Call bell is within reach. Patient is due to void by 2000 1815 Asked patient if she wants to get out of the bed to the chair and refuses at this time. Call bell is within reach. Bedside and Verbal shift change report given to Cleopatra Cano Rn (oncoming nurse) by Mahesh Thompson RN (offgoing nurse). Report included the following information SBAR, Kardex and MAR.

## 2017-02-06 NOTE — PERIOP NOTES
Ms. Joni Carvajal forgot to take off a small silver tone ring and her family is gone with the rest of her things. I sent the silver tone tiny ring to PACU with Kalen Orona.

## 2017-02-06 NOTE — PERIOP NOTES
1604 Received pt. Connected pt to monitor. VSS. Assessment preformed. RN at bedside. Will continue to monitor. 1628  Ring placed to right pinky finger. XRay tech at bedside. 1639  Attempted to call to waiting room for update on pt status, no one present. 1649  TRANSFER - OUT REPORT:    Verbal report given to Zina FLYNN(name) on [facility]  being transferred to [facility](unit) for routine post - op       Report consisted of patients Situation, Background, Assessment and   Recommendations(SBAR). Information from the following report(s) SBAR, Procedure Summary, Intake/Output and MAR was reviewed with the receiving nurse. Lines:   Peripheral IV 02/06/17 Left Antecubital (Active)   Site Assessment Clean, dry, & intact 2/6/2017  4:04 PM   Phlebitis Assessment 0 2/6/2017  4:04 PM   Infiltration Assessment 0 2/6/2017  4:04 PM   Dressing Status Clean, dry, & intact 2/6/2017  4:04 PM   Dressing Type Tape;Transparent 2/6/2017  4:04 PM   Hub Color/Line Status Pink; Infusing 2/6/2017  4:04 PM   Action Taken Open ports on tubing capped 2/6/2017  4:04 PM   Alcohol Cap Used Yes 2/6/2017  4:04 PM        Opportunity for questions and clarification was provided. Patient transported with:   O2 @ 2 liters      1712  MD Alexandro Hester (Anesthesiology) stated he would put in note for anesthesia sign out. MD in another case currently.

## 2017-02-07 LAB
ANION GAP BLD CALC-SCNC: 10 MMOL/L (ref 3–18)
BUN SERPL-MCNC: 20 MG/DL (ref 7–18)
BUN/CREAT SERPL: 20 (ref 12–20)
CALCIUM SERPL-MCNC: 9 MG/DL (ref 8.5–10.1)
CHLORIDE SERPL-SCNC: 105 MMOL/L (ref 100–108)
CO2 SERPL-SCNC: 24 MMOL/L (ref 21–32)
CREAT SERPL-MCNC: 0.99 MG/DL (ref 0.6–1.3)
GLUCOSE BLD STRIP.AUTO-MCNC: 135 MG/DL (ref 70–110)
GLUCOSE BLD STRIP.AUTO-MCNC: 138 MG/DL (ref 70–110)
GLUCOSE BLD STRIP.AUTO-MCNC: 185 MG/DL (ref 70–110)
GLUCOSE BLD STRIP.AUTO-MCNC: 246 MG/DL (ref 70–110)
GLUCOSE SERPL-MCNC: 135 MG/DL (ref 74–99)
HCT VFR BLD AUTO: 35.7 % (ref 35–45)
HGB BLD-MCNC: 11.5 G/DL (ref 12–16)
POTASSIUM SERPL-SCNC: 4.1 MMOL/L (ref 3.5–5.5)
SODIUM SERPL-SCNC: 139 MMOL/L (ref 136–145)

## 2017-02-07 PROCEDURE — 97165 OT EVAL LOW COMPLEX 30 MIN: CPT

## 2017-02-07 PROCEDURE — 36415 COLL VENOUS BLD VENIPUNCTURE: CPT | Performed by: ORTHOPAEDIC SURGERY

## 2017-02-07 PROCEDURE — 74011250637 HC RX REV CODE- 250/637

## 2017-02-07 PROCEDURE — 80048 BASIC METABOLIC PNL TOTAL CA: CPT | Performed by: ORTHOPAEDIC SURGERY

## 2017-02-07 PROCEDURE — 82962 GLUCOSE BLOOD TEST: CPT

## 2017-02-07 PROCEDURE — 77010033678 HC OXYGEN DAILY

## 2017-02-07 PROCEDURE — 74011250636 HC RX REV CODE- 250/636: Performed by: ORTHOPAEDIC SURGERY

## 2017-02-07 PROCEDURE — 97116 GAIT TRAINING THERAPY: CPT

## 2017-02-07 PROCEDURE — 94640 AIRWAY INHALATION TREATMENT: CPT

## 2017-02-07 PROCEDURE — 97162 PT EVAL MOD COMPLEX 30 MIN: CPT

## 2017-02-07 PROCEDURE — 74011250637 HC RX REV CODE- 250/637: Performed by: ORTHOPAEDIC SURGERY

## 2017-02-07 PROCEDURE — 77030029684 HC NEB SM VOL KT MONA -A

## 2017-02-07 PROCEDURE — 74011000250 HC RX REV CODE- 250: Performed by: ORTHOPAEDIC SURGERY

## 2017-02-07 PROCEDURE — 65270000029 HC RM PRIVATE

## 2017-02-07 PROCEDURE — 85018 HEMOGLOBIN: CPT | Performed by: ORTHOPAEDIC SURGERY

## 2017-02-07 PROCEDURE — 77030020255 HC SOL INJ LR 1000ML BG

## 2017-02-07 PROCEDURE — 74011000250 HC RX REV CODE- 250: Performed by: PHYSICIAN ASSISTANT

## 2017-02-07 PROCEDURE — 74011250637 HC RX REV CODE- 250/637: Performed by: PHYSICIAN ASSISTANT

## 2017-02-07 PROCEDURE — 97530 THERAPEUTIC ACTIVITIES: CPT

## 2017-02-07 PROCEDURE — 74011636637 HC RX REV CODE- 636/637: Performed by: PHYSICIAN ASSISTANT

## 2017-02-07 RX ORDER — THEOPHYLLINE 200 MG/1
TABLET, EXTENDED RELEASE ORAL
Status: COMPLETED
Start: 2017-02-07 | End: 2017-02-07

## 2017-02-07 RX ORDER — THEOPHYLLINE 200 MG/1
300 TABLET, EXTENDED RELEASE ORAL 2 TIMES DAILY
Status: DISCONTINUED | OUTPATIENT
Start: 2017-02-07 | End: 2017-02-08 | Stop reason: HOSPADM

## 2017-02-07 RX ORDER — IPRATROPIUM BROMIDE AND ALBUTEROL SULFATE 2.5; .5 MG/3ML; MG/3ML
3 SOLUTION RESPIRATORY (INHALATION)
Status: DISCONTINUED | OUTPATIENT
Start: 2017-02-07 | End: 2017-02-08 | Stop reason: HOSPADM

## 2017-02-07 RX ADMIN — ARFORMOTEROL TARTRATE 15 MCG: 15 SOLUTION RESPIRATORY (INHALATION) at 08:57

## 2017-02-07 RX ADMIN — PERPHENAZINE 4 MG: 2 TABLET, FILM COATED ORAL at 09:01

## 2017-02-07 RX ADMIN — OXYCODONE HYDROCHLORIDE AND ACETAMINOPHEN 2 TABLET: 5; 325 TABLET ORAL at 07:28

## 2017-02-07 RX ADMIN — ENOXAPARIN SODIUM 40 MG: 40 INJECTION SUBCUTANEOUS at 22:18

## 2017-02-07 RX ADMIN — AMLODIPINE BESYLATE 5 MG: 5 TABLET ORAL at 09:02

## 2017-02-07 RX ADMIN — INSULIN LISPRO 2 UNITS: 100 INJECTION, SOLUTION INTRAVENOUS; SUBCUTANEOUS at 09:02

## 2017-02-07 RX ADMIN — INSULIN LISPRO 4 UNITS: 100 INJECTION, SOLUTION INTRAVENOUS; SUBCUTANEOUS at 12:40

## 2017-02-07 RX ADMIN — IPRATROPIUM BROMIDE AND ALBUTEROL SULFATE 3 ML: .5; 3 SOLUTION RESPIRATORY (INHALATION) at 14:16

## 2017-02-07 RX ADMIN — THEOPHYLLINE 300 MG: 200 TABLET, EXTENDED RELEASE ORAL at 17:36

## 2017-02-07 RX ADMIN — Medication 10 ML: at 17:38

## 2017-02-07 RX ADMIN — MORPHINE SULFATE 2 MG: 2 INJECTION, SOLUTION INTRAMUSCULAR; INTRAVENOUS at 04:38

## 2017-02-07 RX ADMIN — ALLOPURINOL 100 MG: 100 TABLET ORAL at 09:02

## 2017-02-07 RX ADMIN — BUDESONIDE 500 MCG: 0.5 INHALANT RESPIRATORY (INHALATION) at 20:00

## 2017-02-07 RX ADMIN — CEFAZOLIN SODIUM 2 G: 2 SOLUTION INTRAVENOUS at 02:00

## 2017-02-07 RX ADMIN — DOCUSATE SODIUM 100 MG: 100 CAPSULE, LIQUID FILLED ORAL at 17:38

## 2017-02-07 RX ADMIN — SIMVASTATIN 20 MG: 20 TABLET, FILM COATED ORAL at 22:18

## 2017-02-07 RX ADMIN — ARFORMOTEROL TARTRATE 15 MCG: 15 SOLUTION RESPIRATORY (INHALATION) at 21:00

## 2017-02-07 RX ADMIN — SERTRALINE HYDROCHLORIDE 100 MG: 50 TABLET ORAL at 09:01

## 2017-02-07 RX ADMIN — BUDESONIDE 500 MCG: 0.5 INHALANT RESPIRATORY (INHALATION) at 08:57

## 2017-02-07 RX ADMIN — CELECOXIB 200 MG: 100 CAPSULE ORAL at 09:01

## 2017-02-07 RX ADMIN — MORPHINE SULFATE 2 MG: 2 INJECTION, SOLUTION INTRAMUSCULAR; INTRAVENOUS at 00:54

## 2017-02-07 RX ADMIN — OXYCODONE HYDROCHLORIDE AND ACETAMINOPHEN 2 TABLET: 5; 325 TABLET ORAL at 17:37

## 2017-02-07 RX ADMIN — DOCUSATE SODIUM 100 MG: 100 CAPSULE, LIQUID FILLED ORAL at 09:02

## 2017-02-07 RX ADMIN — PERPHENAZINE 4 MG: 2 TABLET, FILM COATED ORAL at 22:18

## 2017-02-07 RX ADMIN — RANITIDINE 150 MG: 150 TABLET, FILM COATED ORAL at 09:01

## 2017-02-07 RX ADMIN — IPRATROPIUM BROMIDE AND ALBUTEROL SULFATE 3 ML: .5; 3 SOLUTION RESPIRATORY (INHALATION) at 20:00

## 2017-02-07 RX ADMIN — RANITIDINE 150 MG: 150 TABLET, FILM COATED ORAL at 17:38

## 2017-02-07 RX ADMIN — OXYCODONE HYDROCHLORIDE AND ACETAMINOPHEN 2 TABLET: 5; 325 TABLET ORAL at 11:28

## 2017-02-07 RX ADMIN — CELECOXIB 200 MG: 100 CAPSULE ORAL at 17:38

## 2017-02-07 RX ADMIN — IPRATROPIUM BROMIDE AND ALBUTEROL SULFATE 3 ML: .5; 3 SOLUTION RESPIRATORY (INHALATION) at 04:38

## 2017-02-07 RX ADMIN — BUSPIRONE HYDROCHLORIDE 30 MG: 5 TABLET ORAL at 09:01

## 2017-02-07 RX ADMIN — Medication 10 ML: at 22:14

## 2017-02-07 NOTE — PROGRESS NOTES
Certified Amena Gupta provider rounded on [address] to provide education related to sleep apnea after chart review for risk factors. Risk factors include:  1. HTN   2. Diabetes, type 2  3. GERD  4. COPD  5. STOP BANG score 4    Provided patient with the following pamphlets:  1. What is Sleep Apnea  2. Sleep and Medical problems  3. Helpful tips in PAP therapy for treatment of sleep apnea    Provided patient with education related to sleep disorders and comorbid conditions. Patient stated her pulmonologist suggested she have a sleep study but she has not yet done so. Patient experiencing pain, nurse called per patient request to transfer from chair to bed. Left information with patient and will follow up tomorrow.

## 2017-02-07 NOTE — PROGRESS NOTES
Problem: Mobility Impaired (Adult and Pediatric)  Goal: *Acute Goals and Plan of Care (Insert Text)  Physical Therapy Goals  Initiated 2/7/2017 and to be accomplished within 7 day(s)  1. Patient will move from supine to sit and sit to supine , scoot up and down and roll side to side in bed with modified independence. 2. Patient will transfer from bed to chair and chair to bed with modified independence using the least restrictive device. 3. Patient will perform sit to stand with modified independence. 4. Patient will ambulate with modified independence for 300 feet with the least restrictive device. 5. Patient will ascend/descend 4 stairs with handrail(s) with supervision/set-up to egress home . Outcome: Progressing Towards Goal  PHYSICAL THERAPY EVALUATION     Patient: Sedonia Pallas (64 y.o. female)  Date: 2/7/2017  Primary Diagnosis: [id number] osteoarthirtis  Osteoarthritis of left hip  Procedure(s) (LRB):  left total hip replacement - hardinge approach right lateral decubitus position (Left) 1 Day Post-Op   Precautions:   Fall, WBAT, Total hip      PROBLEM LIST:  Patient presents with the following problems:   Bed Mobility, Transfers, Gait, Strength, Balance, Home Exercise Program, Stairs and Precautions  ASSESSMENT :   Patient requires between maximal assistance and minimal assistance/contact guard assist  Of two for bed mobility, transfers and ambulation. patient needed rest periods for each transition with cues to breath thru nose . Needing verbal tactile cues to follow proper sequence and has decreased endurance would probably not appropriate for going to the gym this afternoon. Educated on exercises to do in bed, plan and frequency of physical therapy while in hospital  Needs reinforcement . Pain 7/10 pre and post  4/10 during session.  Recommendations for nursing:  Written on communication board:  Rolling walker, up with assist, appointment times 11-12 and 1-2  Verbally communicated to: Leah Ross RN  Patient will benefit from skilled intervention to address the above impairments. Patients rehabilitation potential is considered to be Fair  Factors which may influence rehabilitation potential include:   [ ]         None noted  [ ]         Mental ability/status  [X]         Medical condition  [ ]         Home/family situation and support systems  [ ]         Safety awareness  [ ]         Pain tolerance/management  [ ]         Other:        PLAN :  Recommendations and Planned Interventions:  [X]           Bed Mobility Training             [X]    Neuromuscular Re-Education  [X]           Transfer Training                   [ ]    Orthotic/Prosthetic Training  [X]           Gait Training                          [ ]    Modalities  [X]           Therapeutic Exercises          [ ]    Edema Management/Control  [X]           Therapeutic Activities            [X]    Patient and Family Training/Education  [ ]           Other (comment):     Frequency/Duration: Patient will be followed by physical therapy 1-2 times per day/4-7 days per week to address goals. Discharge Recommendations: Rehab and Home Health  Further Equipment Recommendations for Discharge: rolling walker       SUBJECTIVE:   Patient stated I'm tired .       OBJECTIVE DATA SUMMARY:       Past Medical History   Diagnosis Date    Arthritis         all over the body    Asthma      Chronic left hip pain      Chronic pain      Diabetes ([facility]      Hip pain, right      Hypertension      Morbid obesity ([facility] [facility]      Psychiatric disorder         anxiety and depression     Past Surgical History   Procedure Laterality Date    Hx hernia repair        Hx hysterectomy        Hx wrist fracture tx         Barriers to Learning/Limitations: yes;  physical  Compensate with: visual, verbal, tactile, kinesthetic cues/model     G CODES:Mobility  Current  CL= 60-79%   Goal  CJ= 20-39%.   The severity rating is based on the Other Modified [place] Level of Assistance Scale : 28/36   Modified [place] Level of Assistance Scale : 28/36  The 6 Item Function Outcome Measure  1) Supine to sitting edge of bed: . .4. 2) Sitting to standing: . 4.. 3) Walking: 3... 4) Negotiating one step: 6... 5) Distance walked: 6... 6) Assistive devised used (if any): . 5.. Total: 28. ../36  Scores:  Items 1-4  0. Independent: No assistance or supervision needed is necessary to safely perform the activity (with or without an assistive device/aid)    1. Standby: Nearby supervision is required; no contact necessary   2. Minimal: One point of contact is necessary, including helping with the application of the assistive device, getting legs on/off leg rest, and stabilizing the assistive device. 3. Moderate: Two points of contact needed (1-2 people)  4. Maximal: Significant support - 3 or more points of contact (>1 person)  5. Failed: Attempted activity but failed with maximal assistance  6. Not tested: Test was not attempted due to medical reasons or reasons of safety   Item: 5  0.  >40 m  1.  26-40 m  2.  10-25 m  3.  5-9 m  4.  3-4 m  5.  2 m  6. : < 2 m  Item 6  0. No assistive device  1.  1 stick or crutch  2.  2 sticks  3.  2 elbow crutches  4.  2 axillary crutches  5. Frame (standard or wheelie)   6. Gutter/platform frame, standing , hoist, or unsafe to use aid.          Eval Complexity: History: MEDIUM  Complexity : 1-2 comorbidities / personal factors will impact the outcome/ POC Exam:MEDIUM Complexity : 3 Standardized tests and measures addressing body structure, function, activity limitation and / or participation in recreation  Presentation: MEDIUM Complexity : Evolving with changing characteristics  Clinical Decision Making:Medium Complexity Modified [place] Level of Assistance Scale : 28/36 Overall Complexity:MEDIUM     Prior Level of Function/Home Situation: I with ambulation with straight cane  At home  Home Situation  Home Environment: Private residence  # Steps to Enter: 3  One/Two Story Residence: One story  Living Alone: No  Support Systems: Family member(s) (family member is deaf )  Patient Expects to be Discharged to[de-identified] Private residence  Current DME Used/Available at Home: andrez Alonzo, Com[employer], bedside  Tub or Shower Type: Tub/Shower combination  Critical Behavior:  Neurologic State: Alert  Orientation Level: Oriented X4  Cognition: Follows commands  Safety/Judgement: Fall prevention  Psychosocial  Patient Behaviors: Calm; Cooperative  Visitor Behaviors: Calm; Appropriate for situation; Cooperative  Purposeful Interaction: Yes  Pt Identified Daily Priority: Clinical issues (comment)  Caritas Process: Nurture loving kindness;Establish trust  Caring Interventions: Reassure  Reassure: Therapeutic listening;Caring rounds  Skin Condition/Temp: Warm;Dry  Skin Integrity: Incision (comment)  Skin Integumentary  Skin Color: Appropriate for ethnicity  Skin Condition/Temp: Warm;Dry  Skin Integrity: Incision (comment)  Strength:    Strength: Generally decreased, functional (right knee ext 3-/5 left 4-/5)  Tone & Sensation:   Tone: Normal  Sensation: Intact  Range Of Motion:  AROM: Generally decreased, functional  PROM: Generally decreased, functional  Functional Mobility:  Bed Mobility:  Rolling: Moderate assistance; Additional time;Assist x1;Assist x2  Supine to Sit: Moderate assistance; Additional time;Assist x1;Assist x2  Sit to Supine:  (left in chair)  Transfers:  Sit to Stand: Assist x2; Additional time;Maximum assistance  Stand to Sit: Maximum assistance; Additional time;Assist x2  Balance:   Sitting: Intact  Standing: Impaired  Standing - Static: Fair;Good  Standing - Dynamic : Fair  Ambulation/Gait Training:  Distance (ft): 4 Feet (ft)  Assistive Device: Walker, rolling   Gait Description (WDL): Exceptions to WDL  Gait Abnormalities: Antalgic;Decreased step clearance; Path deviations; Step to gait  Right Side Weight Bearing: As tolerated  Left Side Weight Bearing: As tolerated  Base of Support: Center of gravity altered; Widened  Speed/Cris: Delayed;Pace decreased (<100 feet/min); Slow  Step Length: Left shortened;Right shortened  Interventions: Safety awareness training;Verbal cues; Visual/Demos   Moderate assist of 1 and min A of one  additional time   Therapeutic Exercises:   ankel pumps, glut set and quad sets  Pain:  Pre treatment pain level:7  Post treatment pain level:7  Pain Scale 1: Numeric (0 - 10)  Activity Tolerance:   Fair minus on oxygen 2l/min n/c  Please refer to the flowsheet for vital signs taken during this treatment. After treatment:   [X]         Patient left in no apparent distress sitting up in chair  [ ]         Patient left in no apparent distress in bed  [X]         Call bell left within reach  [X]         Nursing notified  [ ]         Caregiver present  [ ]         Bed alarm activated      COMMUNICATION/EDUCATION:   [X]         Fall prevention education was provided and the patient/caregiver indicated understanding. [X]         Patient/family have participated as able in goal setting and plan of care. [X]         Patient/family agree to work toward stated goals and plan of care. [ ]         Patient understands intent and goals of therapy, but is neutral about his/her participation. [ ]         Patient is unable to participate in goal setting and plan of care. Patient educated on the role of physical therapy during the acute stay  and the importance of mobility. VU.needs reinforcement.        Thank you for this referral.  Kady Jenkins, PT   Time Calculation: 35 mins

## 2017-02-07 NOTE — ACP (ADVANCE CARE PLANNING)
Patient has designated ____her friend____________________ to participate in his/her discharge plan and to receive any needed information.      Name: Ike Freeze  Address:  Phone number: [phone]

## 2017-02-07 NOTE — PROGRESS NOTES
Nutrition initial assessment/Plan of care      RECOMMENDATIONS:   1. Consistent Carb diet  2. Monitor weight and PO intake  3. RD to follow     GOALS:   1. PO intake meets >75% of protein/calorie needs by 2/14  2. Gradual weight loss (1-2 lb) by 2/14    ASSESSMENT:   Per BMI of 40.5, weight is in the obesity classification. Labs noted. Average BG in the last 24 hours: 108-185. Nutrition recommendations listed. RD to follow. Nutrition Diagnoses:   Obesity related to previous excessive energy intake as evidenced by BMI of 40.5. Nutrition Risk:  [] High  [] Moderate [x]  Low    SUBJECTIVE/OBJECTIVE:   Pt w/ severe osteoarthritis of left hip. He had left total hip replacement on 2/6. Pt states that she slept through breakfast this am. She reports variable appetite PTA. She states that her UBW was 213-216 lb but gained weight since she stopped smoking in September. No known food allergy.      Information Obtained from:    [x] Chart Review   [x] Patient   [] Family/Caregiver   [] Nurse/Physician   [] Interdisciplinary Meeting/Rounds    Dx: severe osteoarthritis of left hip  Diet: consistent carb  Medications: [x] Reviewed  (colace, lispro)  Allergies: [x] Reviewed   Past Medical History   Diagnosis Date    Arthritis      all over the body    Asthma     Chronic left hip pain     Chronic pain     Diabetes ([address]     Hip pain, right     Hypertension     Morbid obesity ([address]     Psychiatric disorder      anxiety and depression      Labs:    Lab Results   Component Value Date/Time    Sodium 139 02/07/2017 04:10 AM    Potassium 4.1 02/07/2017 04:10 AM    Chloride 105 02/07/2017 04:10 AM    CO2 24 02/07/2017 04:10 AM    Anion gap 10 02/07/2017 04:10 AM    Glucose 135 02/07/2017 04:10 AM    BUN 20 02/07/2017 04:10 AM    Creatinine 0.99 02/07/2017 04:10 AM    Calcium 9.0 02/07/2017 04:10 AM     Anthropometrics: BMI (calculated): 40.5  Last 3 Recorded Weights in this Encounter    01/17/17 1501 02/06/17 1126 Weight: 104.3 kg (230 lb) 103.4 kg (228 lb)      Ht Readings from Last 1 Encounters:   02/06/17 5' 3\" (1.6 m)       IBW: 115 lb %IBW: 198% UBW: 213-216 lb %UBW: 106%   [] Weight Loss [x] Weight Gain [] Weight Stable    Estimated Nutrition Needs: [x] MSJ  [] Other:  Calories: 6257-7623 kcal Based on:   [x] Actual BW    Protein:   65-75 g Based on:   [x] IBW    Fluid:       7948-6626 ml Based on:   [x] Actual BW      [x] No Cultural, Taoism or ethnic dietary need identified.     [] Cultural, Taoism and ethnic food preferences identified and addressed     Wt Status:  [] Normal (18.6 - 24.9) [] Underweight (<18.5) [] Overweight (25 - 29.9) [] Mild Obesity (30 - 34.9)  [] Moderate Obesity (35 - 39.9) [x] Morbid Obesity (40+)   [] Moderate Malnutrition [] Severe Malnutrition in the context of :     Nutrition Problems Identified:   [] Suboptimal PO intake   [] Food Allergies  [] Difficulty chewing/swallowing/poor dentition  [] Constipation/Diarrhea   [] Nausea/Vomiting   [x] None  [] Other:     Plan:   [x] Therapeutic Diet  []  Obtained/adjusted food preferences/tolerances and/or snacks options   []  Supplements added   [] Occupational therapy following for feeding techniques  []  HS snack added   []  Modify diet texture   []  Modify diet for food allergies   [x]  Educate patient (My healthy plate)  []  Assist with menu selection   [x]  Monitor PO intake on meal rounds   [x]  Continue inpatient monitoring and intervention   []  Participated in discharge planning/Interdisciplinary rounds/Team meetings   []  Other:     Education Needs:   [] Not appropriate for teaching at this time due to:   [x] Identified and addressed    Nutrition Monitoring and Evaluation:  [x] Continue ongoing monitoring and intervention  [] Other    Mirna Beatty, [address] [place] [address]  Pager: [phone]

## 2017-02-07 NOTE — PROGRESS NOTES
2986: AM rounds complete. Pt educated on use of Progress Report. Pt educated on use of IS. Demonstrates understanding. Tolerated at 750. Pt states she has been OOB last night and this morning. Pt states she is not doing well today. States she has been having pain. Pt received percocet 1 hour ago. Pt educated on plan for mobility, gym, pain control today. Pt states, \"I don't know how much I can do in a gym\". Educated pt on importance of mobility throughout admission. Verbalized understanding. Pt states she does not have anyone to help her at home upon d/c. Possible plan for SNF needed.      -Orthopedic

## 2017-02-07 NOTE — PROGRESS NOTES
Problem: Mobility Impaired (Adult and Pediatric)  Goal: *Acute Goals and Plan of Care (Insert Text)  Physical Therapy Goals  Initiated 2/7/2017 and to be accomplished within 7 day(s)  1. Patient will move from supine to sit and sit to supine , scoot up and down and roll side to side in bed with modified independence. 2. Patient will transfer from bed to chair and chair to bed with modified independence using the least restrictive device. 3. Patient will perform sit to stand with modified independence. 4. Patient will ambulate with modified independence for 300 feet with the least restrictive device. 5. Patient will ascend/descend 4 stairs with handrail(s) with supervision/set-up to egress home . PHYSICAL THERAPY TREATMENT     Patient: Adelfo Townsend (69 y.o. female)  Date: 2/7/2017  Diagnosis: [id number] osteoarthirtis  Osteoarthritis of left hip Osteoarthritis of left hip  Procedure(s) (LRB):  left total hip replacement - hardinge approach right lateral decubitus position (Left) 1 Day Post-Op  Precautions: Fall, WBAT, Total hip  Chart, physical therapy assessment, plan of care and goals were reviewed. ASSESSMENT:  Patient in bed with 2L 02. Patient agrred to participate with PT. Patient required max A x 2 with bed mobility and transfer supine to sit. Pt transfer sit<>stand with max A x 2 and able to tolerate standing approx 2 minutes. Pt. Attempted shuffled 2 steps forward and 2 steps back. Patient very SOB with 2 L O2 and constant VCS given for breathing conservation. Patient required max A x 2 for stand to sit and O2 level dropped to 91%. After approx 2 minutes rest O2 98%. Pt. Returned to bed with max A from sit to supine. Patient reported feeling very tired.   Progression toward goals:  [ ]      Improving appropriately and progressing toward goals  [ ]      Improving slowly and progressing toward goals  [X]      Not making progress toward goals and plan of care will be adjusted       PLAN:  Patient continues to benefit from skilled intervention to address the above impairments. Continue treatment per established plan of care. Discharge Recommendations:  TBD  Further Equipment Recommendations for Discharge:  TBD       SUBJECTIVE:   Patient stated I won't have much help. \"      OBJECTIVE DATA SUMMARY:   Critical Behavior:  Neurologic State: Alert  Orientation Level: Oriented X4  Cognition: Follows commands  Safety/Judgement: Fall prevention  Functional Mobility Training:  Bed Mobility:  Rolling: Moderate assistance;Maximum assistance; Additional time  Supine to Sit: Maximum assistance; Additional time;Assist x1  Sit to Supine: Maximum assistance;Assist x2; Additional time  Scooting: Moderate assistance                    Transfers:  Sit to Stand: Maximum assistance;Assist x2;Adaptive equipment  Stand to Sit: Maximum assistance;Assist x2; Additional time                             Balance:  Sitting: Impaired; With support  Sitting - Static: Fair (occasional)  Sitting - Dynamic: Fair (occasional)  Standing: Impaired; With support  Standing - Static: Poor  Standing - Dynamic : Poor  Ambulation/Gait Training:  Distance (ft): 4 Feet (ft)  Assistive Device: Walker, rolling  Ambulation - Level of Assistance: Maximum assistance;Assist x2; Additional time     Gait Description (WDL): Exceptions to WDL  Gait Abnormalities: Path deviations; Shuffling gait  Right Side Weight Bearing: As tolerated  Left Side Weight Bearing: As tolerated  Base of Support: Widened     Speed/Cris: Shuffled;Delayed  Step Length: Left shortened;Right shortened        Interventions: Safety awareness training;Verbal cues; Visual/Demos        Pain:  Pain Scale 1: Numeric (0 - 10)  Pain Intensity 1: 5  Pain Location 1: Leg  Pain Orientation 1: Left  Pain Description 1: Aching  Pain Intervention(s) 1: Medication (see MAR)  Activity Tolerance:   Poor  Please refer to the flowsheet for vital signs taken during this treatment.   After treatment:   [ ] Patient left in no apparent distress sitting up in chair  [X] Patient left in no apparent distress in bed  [X] Call bell left within reach  [ ] Nursing notified  [ ] Caregiver present  [ ] Bed alarm activated      Isidoro Rios PTA   Time Calculation: 20 mins

## 2017-02-07 NOTE — PROGRESS NOTES
Patient status post HIP ARTHROPLASTY TOTAL for [id number] osteoarthirtis  Osteoarthritis of left hip , c/o incision pain. Voiding. Visit Vitals    /81 (BP 1 Location: Left arm, BP Patient Position: At rest)    Pulse 100    Temp 98.8 °F (37.1 °C)    Resp 16    Ht 5' 3\" (1.6 m)    Wt 103.4 kg (228 lb)    SpO2 93%    BMI 40.39 kg/m2       CBC w/Diff    Lab Results   Component Value Date/Time    WBC 16.1 (H) 02/06/2017 12:33 PM    RBC 4.08 (L) 02/06/2017 12:33 PM    HCT 35.7 02/07/2017 04:10 AM    MCV 93.9 02/06/2017 12:33 PM    MCH 30.4 02/06/2017 12:33 PM    MCHC 32.4 02/06/2017 12:33 PM    RDW 14.4 02/06/2017 12:33 PM    Lab Results   Component Value Date/Time    MONOS 3 02/06/2017 12:33 PM    EOS 5 02/06/2017 12:33 PM    BASOS 0 02/06/2017 12:33 PM    RDW 14.4 02/06/2017 12:33 PM          Physical exam: aaox3, surgical dressing dry, bilateral anterior tibialis and gastrocnemius strength 5/5 ,  palpable distal pulses, sensation intact,  BLE compartments soft  and nontender. Assessment:  Status post HIP ARTHROPLASTY TOTAL for [id number] osteoarthirtis  Osteoarthritis of left hip ,  progressing.     PLAN:  Mobilize with P.T.   DVT [id number]   Discharge Planning-home vs SNF    [facility] ASIA Villar  February 7, 2017

## 2017-02-07 NOTE — OP NOTES
Zoltan Iglesias    Name:  Janet Brumfield  MR#:  [id number]  :  1955  Account #:  [de-identified]  Date of Adm:  2017  Date of Surgery:  2017      PREOPERATIVE DIAGNOSIS:   Severe osteoarthritis left hip. POSTOPERATIVE DIAGNOSIS:   Severe osteoarthritis left hip. PROCEDURES PERFORMED:   Left total hip replacement with  Valery with Hardinge approach. ANESTHESIA:  **general*    INDICATIONS: Is a pleasant 71-year-old woman with a weight of 228  pounds and height of 5 feet 3 inches with comorbidities, who has  advanced arthritis of the left hip. We trialed nonoperative measures and  after failing these, she confirms her desire to proceed with  recommended left total hip replacement. We discussed approaches  and agreed upon a lateral incision and a Hardinge approach due to a  large overhanging pannus. She confirms she desires to proceed and  is brought to the operating room for the anticipated left total hip  replacement. DESCRIPTION OF PROCEDURE: The patient was brought to the  main operating room #5 at [facility] [place] after obtaining  satisfactory anesthetic and administering IV antibiotics and tranexamic  acid. The left hip prepared and draped for surgery, was then verified  and antibiotic delivery and TXA delivery confirmed. I marked out a  proposed 15 cm incision and made an incision from 5 cm above the tip  of the greater trochanter down the thigh, additional 10 cm through the  skin and subcutaneous tissue. Incision was carried down sharply to the  IT band. The IT band was incised over the tip of the greater trochanter  and the incision extended proximal and distal with curved Hussein  scissors. A Charnley retractor was placed and the trochanteric bursa  was sharply excised. The anterior 2/3 of the gluteus medius was  elevated, tagged and reflected. The gluteus medius was likewise  tagged and elevated and reflected.  An inverted T incision was placed  in the capsule and the superior and inferior leaves were tagged and  reflected. I did adduct and externally rotate the hip, dislocating the  femoral head from the acetabulum. The head was misshaped with full-  thickness cartilage erosions and osteophytes. I marked the anticipated  resection level approximately 10 mm superior to the lesser trochanter  and used a resection guide in line with the femur and proximally at the  level of the base of the greater trochanter and marked as proposed  cut. An oscillating saw was used to make the cut. Femoral neck and  head and its attached soft tissues were removed. I did remove the  ligament of teres and the anterior and superior posterior labrum and  positioned Cobra retractors beneath the anterior, inferior and posterior  inferior acetabulum. I then sequentially reamed from 45 degrees in 1  mm increments up to 49 mm. Additionally we medialized through the  cotyloid fossa and then at 45 degrees of abduction and 15 degrees of  anteversion. I did have bleeding bone in all quadrants. I tapped the  window pane trial into position and it seated flush and secure. I  removed the window pane trial. We pulse lavaged the acetabulum and  suctioned it dry and tapped the 50 mm Tritanium cup into position at 45  degrees of abduction and 15 degrees of anteversion. It did stick  securely. I did assess the apical hole after the secondary impactor and  confirmed that  the cup was seated securely. I did place one measured  25 mm screw posterior superior with secure purchase. I then placed  the liner to accommodate a 32 mm head.  It was seated on the tines of  the shell and digitally reduced and then struck with the impactor,  verifying that it was locked into place, we removed the retractors, pulse  lavaged the acetabulum, positioned the leg in adduction, external  rotation and used a box osteotome to open the proximal femur,  followed by the canal finder and sequential broaching from 0 up to a 4,  127 degree neck angle hip stem. This did fill the canal, was quite  stable to manual torsion. I reduced the hip with a 35 mm neck and a 0-  mm offset head and it did reduce the appropriate tension and rotation. It did reestablish leg length from hip to knee and down to the heel and  foot. We then flexed the hip beyond 90 degrees, adducted across the  midline, internally rotated to 60 degrees, abducted to 30 degrees,  externally rotate to 30 degrees with extension with no tendency  towards dislocation. With adduction and maximum external rotation the  hip remained stable and a bone hook was required to dislocate the hip. With the hip dislocated and the leg repositioned I removed the trial  neck and head, reassessed the shell and liner, which remained in  position and then removed the broach with the broach handle and  selected a 4, 127 neck angle hip stem and brought it onto the field. We  pulse lavaged the proximal femur, suctioned it dry and tapped the  implant into position seating at the same level. I did use the 0-mm  offset head as our choices were limited to -4 or +4 with a Biolox head  and I did not feel that additional length would help since she was very  stable and may not be reducible and I felt leg lengths were restored  with a 0 and did not want to shorten the leg with a -4. I did place a 0  mm offset Biolox 32 mm head on the clean B40 trunnion. I tapped it  down and manually tested it and it was secure and then relocated the  hip. Range of motion and hip stability remained unchanged. We pulse  lavaged the wound, I closed the capsule with interrupted figure-of-eight  0 Vicryl sutures. I repaired the gluteus minimus with interrupted figure-  of-eight 0 Vicryl suture through bone. Likewise, with the gluteus  medius interrupted figure-of-eight 0 Vicryl sutures through bone,  followed by the extension repair side-to-side with interrupted figure-of-  eight 0 Vicryl sutures.  We again pulse lavaged the wound. I closed the  TFL fascia with interrupted figure-of-eight 0 Vicryl sutures and then the  subcutaneous tissue with 2-0 Vicryl sutures with knots buried and the  skin with staples. The hip and thigh were cleansed and dried. We applied Adaptic, 4 x 4 gauze, ABD pad, and Medipore tape and the  drapes were removed and an abduction pillow was placed between the  legs. The patient was turned supine, awakened, and extubated and  transferred to the recovery room with anesthesia without  complications. ESTIMATED BLOOD LOSS: Approximately 200 mL.     SPECIMENS REMOVED: *none        MD TRACY Alaniz / ABIGAIL  D:  02/06/2017   16:02  T:  02/07/2017   08:51  Job #:  [id number]

## 2017-02-07 NOTE — ROUTINE PROCESS
Bedside and Verbal shift change report given to Wang Beyer RN (oncoming nurse) by Mercedez Gómez RN   (offgoing nurse). Report included the following information SBAR, OR Summary, Procedure Summary, Intake/Output, MAR and Recent Results.

## 2017-02-07 NOTE — PROGRESS NOTES
Received bedside verbal report from [facility]. Patient trying to get in bed,,bed in low position,call bell within reach,white board updated,Pt asking for pain med,will give her shortly. 0728 Pain med given,pt resting in bed.    0900 Respiratory treatment is going,morning med given,complete assessment done. 0945 Pt complaining of short of breath,oxygen is on 2 ltr,Dr Sinai Narayanan is in room. 6423 Patient working with physiotherapy. 1045 Pt resting in recliner,stating shortness of breath is better than before,will continue to monitor her. 1240 Pt having lunch,looks comfortable,stating that her pain is much better than before. 0 Family wants to taking pt to the Rehab after getting discharge,told them that going to talk to care manager and let them know. 1600 Offered pain med,no complain of pain,don't want pain med at this moment,bed in low position,will continue to monitor her. 1740 Pain med given,encouraged to do the ICS,bed in the lowest position. Bedside and Verbal shift change report given to Benjamin Rodriges (oncoming nurse) by Acosta Reaves (offgoing nurse). Report included the following information SBAR, Kardex, Procedure Summary, Intake/Output, MAR and Recent Results.

## 2017-02-07 NOTE — PROGRESS NOTES
conducted an initial consultation and Spiritual Assessment for Edgard Pickard, who is a 64 y.o.,female. Patients Primary Language is: [place]. According to the patients EMR Mormonism Affiliation is: [place]. The reason the Patient came to the hospital is:   Patient Active Problem List    Diagnosis Date Noted    Osteoarthritis of left hip 02/06/2017    Asthma 02/06/2017    Type 2 diabetes mellitus ([address] 02/06/2017    HTN (hypertension) 02/06/2017    Depression 02/06/2017    Hyperlipidemia 02/06/2017        The  provided the following Interventions:  Initiated a relationship of care and support. Explored issues of dayna, belief, spirituality and Sabianist/ritual needs while hospitalized. Listened empathically. Provided information about Spiritual Care Services. Offered prayer and assurance of continued prayers on patient's behalf. Chart reviewed. The following outcomes were achieved:  Patient shared limited information about both their medical narrative and spiritual journey/beliefs. Patient processed feeling about current hospitalization. Patient expressed gratitude for 's visit. Assessment:  Patient does not have any Sabianist/cultural needs that will affect patients preferences in health care. There are no further spiritual or Sabianist issues which require intervention at this time. Plan:  Chaplains will continue to follow and will provide pastoral care on an as needed/requested basis.  recommends bedside caregivers page  on duty if patient shows signs of acute spiritual or emotional distress. Marylin Canales M.Div.   [facility]  [phone]

## 2017-02-07 NOTE — PROGRESS NOTES
Problem: Self Care Deficits Care Plan (Adult)  Goal: *Acute Goals and Plan of Care (Insert Text)  Occupational Therapy Goals  Initiated 2/7/2017 within 7 day(s). 1. Patient will perform grooming tasks while standing at sink with supervision/set-up   2. Patient will perform lower body dressing with modified independence. 3. Patient will perform bathing with minimal assistance/contact guard assist.  4. Patient will perform toilet transfers with supervision/set-up. 5. Patient will perform all aspects of toileting with supervision/set-up. 6. Patient will participate in upper extremity therapeutic exercise/activities with supervision/set-up for 8 minutes. 7. Patient will utilize energy conservation techniques during functional activities with verbal cues. Outcome: Progressing Towards Goal  OCCUPATIONAL THERAPY EVALUATION     Patient: Capo Eli (47 y.o. female)  Date: 2/7/2017  Primary Diagnosis: [id number] osteoarthirtis  Osteoarthritis of left hip  Procedure(s) (LRB):  left total hip replacement - hardinge approach right lateral decubitus position (Left) 1 Day Post-Op   Precautions:  Fall, WBAT, Total hip      ASSESSMENT :  Based on the objective data described below, the patient presents with impairments with regard to bed mobility in preparation for self care tasks, BUE function, activity tolerance/endurance, and participation in ADLs. Patient required mod A x2 to maneuver to EOB with additional time; max A x2 to stand secondary to decreased strength, increase pain, and decreased activity tolerance. Patient declined to use bedside commode during evaluation; patient maneuvered to recliner chair approx 3 ft away with additional time and required mod/max A to sit down. Patient engaged in therapeutic activity at EOB and in chair in preparation for functional tasks such as grooming and LB dressing and educated on proper postioning of BUEs. Patient/friend educated on role of OT, POC, and hip precautions. Patient verbalized understanding. Patient left up in chair with needs within reach; c/o 7/10 pain. Patient will benefit from skilled OT services during acute care stay to facilitate independence in ADLs; will need AE for LB dressing if d/c home. Patient will benefit from skilled intervention to address the above impairments. Patients rehabilitation potential is considered to be Good  Factors which may influence rehabilitation potential include:   [ ]             None noted  [ ]             Mental ability/status  [X]             Medical condition  [ ]             Home/family situation and support systems  [ ]             Safety awareness  [ ]             Pain tolerance/management  [ ]             Other:      Recommendations for nursing: Up with assist x2  Verbally communicated to: Ellis Garcia RN           PLAN :  Recommendations and Planned Interventions:  [X]               Self Care Training                  [X]        Therapeutic Activities  [X]               Functional Mobility Training    [ ]        Cognitive Retraining  [X]               Therapeutic Exercises           [X]        Endurance Activities  [X]               Balance Training                   [ ]        Neuromuscular Re-Education  [ ]               Visual/Perceptual Training     [X]   Home Safety Training  [X]               Patient Education                 [X]        Family Training/Education  [ ]               Other (comment):     Frequency/Duration: Patient will be followed by occupational therapy daily, 3-5x a week to address goals. Discharge Recommendations: Rehab  Further Equipment Recommendations for Discharge: shower chair       SUBJECTIVE:   Patient stated I'm in pain. \"      OBJECTIVE DATA SUMMARY:       Past Medical History   Diagnosis Date    Arthritis         all over the body    Asthma      Chronic left hip pain      Chronic pain      Diabetes ([address]      Hip pain, right      Hypertension      Morbid obesity ([address]      Psychiatric disorder         anxiety and depression     Past Surgical History   Procedure Laterality Date    Hx hernia repair        Hx hysterectomy        Hx wrist fracture tx         Barriers to Learning/Limitations: None  Compensate with: visual, verbal, tactile, kinesthetic cues/model     GCODES:  Self Care  Current  CK= 40-59%   Goal  CI= 1-19%. The severity rating is based on the Other Functional Assessment, MMT, ROM     Eval Complexity: History: LOW Complexity : Brief history review ; Examination: LOW Complexity : 1-3 performance deficits relating to physical, cognitive , or psychosocial skils that result in activity limitations and / or participation restrictions ; Decision Making:MEDIUM Complexity : Patient may present with comorbidities that affect occupational performnce. Miniml to moderate modification of tasks or assistance (eg, physical or verbal ) with assesment(s) is necessary to enable patient to complete evaluation      Prior Level of Function/Home Situation: Pt was independent with basic self care tasks and functional mobility PTA. Home Situation  Home Environment: Private residence  # Steps to Enter: 3  One/Two Story Residence: One story  Living Alone: No  Support Systems: Family member(s) (family member is deaf )  Patient Expects to be Discharged to[de-identified] Private residence  Current DME Used/Available at Home: Cane, straight, Commode, bedside  Tub or Shower Type: Tub/Shower combination (no grab bars or shower seat)  [X]  Right hand dominant          [ ]  Left hand dominant  Cognitive/Behavioral Status:  Neurologic State: Alert  Orientation Level: Oriented X4  Cognition: Follows commands  Safety/Judgement: Fall prevention      Skin: Intact (BUEs)  Edema: None noted (BUEs)  Vision/Perceptual:    Acuity: Able to read clock/calendar on wall without difficulty       Coordination:  Coordination: Within functional limits (BUEs)  Fine Motor Skills-Upper: Right Intact; Left Intact    Gross Motor Skills-Upper: Right Intact; Left Intact      Balance:  Sitting: Intact  Standing: Impaired  Standing - Static: Good  Standing - Dynamic : Fair     Strength:  Strength: Generally decreased, functional (BUEs: approx 3+/5)     Tone & Sensation:  Tone: Normal (BUEs)  Sensation: Intact (BUEs)     Range of Motion:  AROM: Generally decreased, functional (BUEs: reached full range with additional time)  PROM: Within functional limits (BUEs)     Functional Mobility and Transfers for ADLs:  Bed Mobility:  Rolling: Moderate assistance; Additional time;Assist x2  Supine to Sit: Moderate assistance; Additional time;Assist x2  Sit to Supine:  (patient left up in recliner chair)     Transfers:  Sit to Stand: Maximum assistance;Assist x2; Additional time              Toilet Transfer :  (not assessed; patient declined)                ADL Assessment:  Feeding: Setup;Supervision  Oral Facial Hygiene/Grooming: Setup;Supervision  Bathing: Maximum assistance  Upper Body Dressing: Minimum assistance  Lower Body Dressing: Maximum assistance  Toileting: Moderate assistance     Cognitive Retraining  Safety/Judgement: Fall prevention     Therapeutic Activity:  While seated at EOB, patient engaged in functional reaching task with verbal cues for proper alignment and positioning of core, BUEs and head in preparation for tasks such as LB dressing and grooming tasks. While seated in recliner chair, patient required min A for postioning of BUEs, head, and core in preparation for functional task. Pain:  Pre treatment pain level: 7/10  Post treatment pain level: 7/10  Pain Scale 1: Numeric (0 - 10)  Pain Intensity 1: 5  Pain Location 1: Leg  Pain Orientation 1: Left  Pain Description 1: Aching  Pain Intervention(s) 1: Medication (see MAR)      Activity Tolerance:  Fair  Please refer to the flowsheet for vital signs taken during this treatment.   After treatment:   [X] Patient left in no apparent distress sitting up in chair  [ ] Patient left in no apparent distress in bed  [X] Call bell left within reach  [X] Nursing notified  [X] Caregiver present  [ ] Bed alarm activated      COMMUNICATION/EDUCATION: Patient/family educated on role of OT and POC. Patient/family verbalized understanding.   [X] Home safety education was provided and the patient/caregiver indicated understanding. [X] Patient/family have participated as able in goal setting and plan of care. [X] Patient/family agree to work toward stated goals and plan of care. [ ] Patient understands intent and goals of therapy, but is neutral about his/her participation. [ ] Patient is unable to participate in goal setting and plan of care.      Thank you for this referral.     Damien Souza MS OTR/L  Time Calculation: 30 mins

## 2017-02-07 NOTE — ROUTINE PROCESS
0615 Pt assisted oob to chair washed her face and brushed her teeth, call bell in pt reach  0645 Pt assisted to bedside commode voided qs, pt refusing to remain out of bed at this time returned to bed, made comfortable in bed, call bell in pt reach

## 2017-02-07 NOTE — PROGRESS NOTES
Progress Note      Patient: Sedonia Pallas               Sex: female          DOA: 2/6/2017       YOB: 1955      Age:  64 y.o.        LOS:  LOS: 1 day               Subjective:   Pt c/o this am of dryness of her nose . Her nasal o2 will need to be bubbled through water . .the pain from her hip   seems to be well controlled . She is being followed by pt and ot and is doing well  The pt is afebrile and but her o2 sats are 90 % today . Will need to do incentive spirometry  Will order a chest x ray in the am.   .has probable sleep apnea    Objective:      Visit Vitals    /78 (BP 1 Location: Left arm, BP Patient Position: At rest)    Pulse 91    Temp 98 °F (36.7 °C)    Resp 16    Ht 5' 3\" (1.6 m)    Wt 103.4 kg (228 lb)    SpO2 90%    BMI 40.39 kg/m2       Physical Exam:  Pt is awake and is alert   Heart reg rate and rhythm   Lungs rhonchi noted bilaterally   Abdomen soft and nontender   extremities   Neuro obese  but neuro is intact     Lab/Data Reviewed:  CMP:   Lab Results   Component Value Date/Time     02/07/2017 04:10 AM    K 4.1 02/07/2017 04:10 AM     02/07/2017 04:10 AM    CO2 24 02/07/2017 04:10 AM    AGAP 10 02/07/2017 04:10 AM     (H) 02/07/2017 04:10 AM    BUN 20 (H) 02/07/2017 04:10 AM    CREA 0.99 02/07/2017 04:10 AM    GFRAA >60 02/07/2017 04:10 AM    GFRNA 57 (L) 02/07/2017 04:10 AM    CA 9.0 02/07/2017 04:10 AM     CBC:   Lab Results   Component Value Date/Time    HGB 11.5 (L) 02/07/2017 04:10 AM    HCT 35.7 02/07/2017 04:10 AM           Assessment/Plan     Principal Problem:    Osteoarthritis of left hip (2/6/2017)    Active Problems:    Asthma (2/6/2017)      Type 2 diabetes mellitus ([address] (2/6/2017)      HTN (hypertension) (2/6/2017)      Depression (2/6/2017)      Hyperlipidemia (2/6/2017)  Probable sleep apnea      Plan: will follow post op .  Pt is being mobilized by pt and ot

## 2017-02-07 NOTE — ANESTHESIA POSTPROCEDURE EVALUATION
Post-Anesthesia Evaluation & Assessment    Visit Vitals    /79    Pulse (!) 103    Temp 37.4 °C (99.3 °F)    Resp 16    Ht 5' 3\" (1.6 m)    Wt 103.4 kg (228 lb)    SpO2 93%    BMI 40.39 kg/m2       Nausea/Vomiting: no nausea    Pain score (VAS): 4    Post-operative hydration adequate.     Mental status & Level of consciousness: orientation per pre-anesthetic level    Neurological status: moves all extremities, sensation grossly intact    Pulmonary status: airway patent, no supplemental oxygen required    Complications related to anesthesia: none    Additional comments:        James Terrell CRNA  February 7, 2017

## 2017-02-07 NOTE — PROGRESS NOTES
[facility]   Discharge Planning/ Assessment    Reasons for Intervention: Chart reviewed. Met with pt., verified all demographics. States has [employer] ins, has NO SNF benefit, does have acute in-pt rehab benefit, if qualifies & authorization obtained. Made pt/family aware of above. NOK: Ellen Franco, cousin [phone]. : Charlette Kayden, friend, whom she designates can participate in her discharge process. Lives with one of her cousins who is deaf. Has the following DME: walker, cane & BSC. Pt would like to go to rehab, informed her that if she qualifies & bed available then would have to see if [employer] would authorize, verbalized understanding. Posted in e-discharge. PLAN: acute in-pt rehab VS home with home health. Will cont to follow for further needs. Pat [address] [address]. [address].       High Risk Criteria  [x] Yes  []No   Physician Referral  [] Yes  [x]No        Date    Nursing Referral  [] Yes  [x]No        Date    Patient/Family Request  [] Yes  [x]No        Date       Resources:    Medicare  [] Yes  [x]No   Medicaid  [x] Yes  []No   No Resources  [] Yes  [x]No   Private Insurance  [] Yes  [x]No    Name/Phone Number    Other  [] Yes  [x]No        (i.e. Workman's Comp)         Prior Services:    Prior Services  [] Yes  [x]No   Home Health  [] Yes  [x]No   [facility]  [] Yes  [x]No        Number of [address]  [] Yes  [x]No       Meals on Wheels  [] Yes  [x]No   Office on Aging  [] Yes  [x]No   Transportation Services  [] Yes  [x]No   Nursing Home  [] Yes  [x]No        Nursing Home Name    [address]  [] Yes  [x]No        [address] Name    Other       Information Source:      Information obtained from  [x] Patient  [] Parent   [] [address]  [] Child  [] Spouse   [] Significant Other/Partner   [] Friend      [] EMS    [] Nursing Home Chart          [] Other:   Chart Review  [x] Yes  []No     Family/Support System:    Patient lives with  [] Alone    [] Spouse   [] Significant Other  [] Children  [] Caretaker   [] Parent  [] Sibling     [x] Other deaf cousin      Other Support System:    Is the patient responsible for care of others  [] Yes  [x]No   Information of person caring for patient on  discharge    Managers financial affairs independently  [x] Yes  []No   If no, explain:      Status Prior to Admission:    Mental Status  [x] Awake  [x] Alert  [x] Oriented  [x] Quiet/Calm [] Lethargic/Sedated   [] Disoriented  [] Restless/Anxious  [] Combative   Personal Care  [] Dependent  [] [address]  [x] Requires Assistance   Meal Preparation Ability  [] Independent   [] Standby Assistance   [x] Minimal Assistance   [] Moderate Assistance  [] Maximum Assistance     [] Total Assistance   Chores  [] Independent with Chores   [] N/A Nursing Home Resident   [x] Requires Assistance   Bowel/Bladder  [x] Continent  [] Catheter  [] Incontinent  [] Ostomy Self-Care    [] Urine Diversion Self-Care  [] Maximum Assistance     [] Total Assistance   Number of Persons needed for assistance    DME at home  [] Frankie Pulido  [x] Gabo Pulido   [x] Commode    [] Bathroom/Grab Bars  [] Hospital Bed  [] Nebulizer  [] Oxygen           [] Raised Toilet Seat  [] Shower Chair  [] Side Rails for Bed   [] Tub Transfer Bench   [x] Marce Clarity  [] Rheta Harblanquita, Standard      [] Other:   Vendor      Treatment Presently Receiving:    Current Treatments  [] Chemotherapy  [] Dialysis  [] Insulin  [x] IVAB [x] IVF   [] O2  [] PCA   [x] PT   [] RT   [] Tube Feedings   [] Wound Care     Psychosocial Evaluation:    Verbalized Knowledge of Disease Process  [] Patient  []Family   Coping with Disease Process  [] Patient  []Family   Requires Further Counseling Coping with Disease Process  [] Patient  []Family     Identified Projected Needs:    Home Health Aid  [] Yes  [x]No   Transportation  [] Yes  [x]No   Education  [] Yes  [x]No        Specific Education     Financial Counseling  [] Yes  [x]No   Inability to Care for Self/Will Require 24 hour care  [] Yes  [x]No   Pain Management  [] Yes  [x]No   Home Infusion Therapy  [] Yes  [x]No   Oxygen Therapy  [] Yes  [x]No   DME  [] Yes  [x]No   Long Term Care Placement  [] Yes  [x]No   Rehab  [x] Yes  []No   Physical Therapy  [x] Yes  []No   Needs Anticipated At This Time  [x] Yes  []No     Intra-Hospital Referral:    [address] [place] [address]  [] Yes  [x]No     [] Yes  [x]No   Patient Representative  [] Yes  [x]No   Staff for Teaching Needs  [] Yes  [x]No   Specialty Teaching Needs     Diabetic Educator  [] Yes  [x]No   Referral for Diabetic Educator Needed  [] Yes  [x]No  If Yes, place order for Nutritionist or Diabetic Consult     Tentative Discharge Plan:    Home with No Services  [] Yes  [x]No   Home with Home Health Follow-up  [x] Yes  []No        If Yes, specify type VS acute rehab   Home Care Program  [] Yes  [x]No        If Yes, specify type    Meals on Wheels  [] Yes  [x]No   Office of Aging  [] Yes  [x]No   NHP  [] Yes  [x]No   Return to the Nursing Home  [] Yes  [x]No   Rehab Therapy  [x] Yes  []No   Acute Rehab  [x] Yes  []No   Subacute Rehab  [] Yes  [x]No   Private Care  [] Yes  [x]No   Substance Abuse Referral  [] Yes  [x]No   Transportation  [] Yes  [x]No   Chore Service  [] Yes  [x]No   Inpatient Hospice  [] Yes  [x]No   OP RT  [] Yes  [x] No   OP Hemo  [] Yes  [x] No   OP PT  [] Yes  [x]No   Support Group  [] Yes  [x]No   Reach to Recovery  [] Yes  [x]No   OP Oncology Clinic  [] Yes  [x]No   Clinic Appointment  [] Yes  [x]No   DME  [] Yes  [x]No   Comments    Name of D/C Planner or  Given to Patient or Family Merrick Loser   Phone Number Pager: [phone]        Extension Ext. [phone]. VM [phone]   Date 2-7-2017   Time    If you are discharged home, whom do you designate to participate in your discharge plan and receive any information needed?      Enter name of Robe Edwards Phone # of designee [phone]        Address of designee         Updated         Patient refused to designate any           individual

## 2017-02-08 ENCOUNTER — HOSPITAL ENCOUNTER (INPATIENT)
Age: 62
LOS: 9 days | Discharge: HOME | DRG: 862 | End: 2017-02-17
Attending: INTERNAL MEDICINE | Admitting: INTERNAL MEDICINE
Payer: MEDICAID

## 2017-02-08 ENCOUNTER — APPOINTMENT (OUTPATIENT)
Dept: GENERAL RADIOLOGY | Age: 62
DRG: 301 | End: 2017-02-08
Attending: HOSPITALIST
Payer: MEDICAID

## 2017-02-08 VITALS
OXYGEN SATURATION: 88 % | SYSTOLIC BLOOD PRESSURE: 134 MMHG | DIASTOLIC BLOOD PRESSURE: 75 MMHG | BODY MASS INDEX: 40.4 KG/M2 | WEIGHT: 228 LBS | TEMPERATURE: 98.1 F | HEIGHT: 63 IN | RESPIRATION RATE: 16 BRPM | HEART RATE: 95 BPM

## 2017-02-08 PROBLEM — Z96.642 AFTERCARE FOLLOWING LEFT HIP JOINT REPLACEMENT SURGERY: Status: ACTIVE | Noted: 2017-02-06

## 2017-02-08 PROBLEM — Z96.642 STATUS POST TOTAL REPLACEMENT OF LEFT HIP: Status: ACTIVE | Noted: 2017-02-06

## 2017-02-08 PROBLEM — R94.4 DECREASED CALCULATED GLOMERULAR FILTRATION RATE (GFR): Status: ACTIVE | Noted: 2017-02-06

## 2017-02-08 PROBLEM — D62 ACUTE BLOOD LOSS AS CAUSE OF POSTOPERATIVE ANEMIA: Status: ACTIVE | Noted: 2017-02-07

## 2017-02-08 PROBLEM — Z47.1 AFTERCARE FOLLOWING LEFT HIP JOINT REPLACEMENT SURGERY: Status: ACTIVE | Noted: 2017-02-06

## 2017-02-08 LAB
ALBUMIN SERPL BCP-MCNC: 2.9 G/DL (ref 3.4–5)
ALBUMIN/GLOB SERPL: 1 {RATIO} (ref 0.8–1.7)
ALP SERPL-CCNC: 113 U/L (ref 45–117)
ALT SERPL-CCNC: 11 U/L (ref 13–56)
ANION GAP BLD CALC-SCNC: 12 MMOL/L (ref 3–18)
AST SERPL W P-5'-P-CCNC: 13 U/L (ref 15–37)
BILIRUB SERPL-MCNC: 0.3 MG/DL (ref 0.2–1)
BUN SERPL-MCNC: 26 MG/DL (ref 7–18)
BUN/CREAT SERPL: 26 (ref 12–20)
CALCIUM SERPL-MCNC: 9.2 MG/DL (ref 8.5–10.1)
CHLORIDE SERPL-SCNC: 104 MMOL/L (ref 100–108)
CO2 SERPL-SCNC: 24 MMOL/L (ref 21–32)
CREAT SERPL-MCNC: 1.01 MG/DL (ref 0.6–1.3)
ERYTHROCYTE [DISTWIDTH] IN BLOOD BY AUTOMATED COUNT: 14.4 % (ref 11.6–14.5)
GLOBULIN SER CALC-MCNC: 2.8 G/DL (ref 2–4)
GLUCOSE BLD STRIP.AUTO-MCNC: 131 MG/DL (ref 70–110)
GLUCOSE BLD STRIP.AUTO-MCNC: 173 MG/DL (ref 70–110)
GLUCOSE BLD STRIP.AUTO-MCNC: 186 MG/DL (ref 70–110)
GLUCOSE BLD STRIP.AUTO-MCNC: 196 MG/DL (ref 70–110)
GLUCOSE SERPL-MCNC: 149 MG/DL (ref 74–99)
HCT VFR BLD AUTO: 32.1 % (ref 35–45)
HGB BLD-MCNC: 10.5 G/DL (ref 12–16)
MCH RBC QN AUTO: 30.2 PG (ref 24–34)
MCHC RBC AUTO-ENTMCNC: 32.7 G/DL (ref 31–37)
MCV RBC AUTO: 92.2 FL (ref 74–97)
PLATELET # BLD AUTO: 266 K/UL (ref 135–420)
PMV BLD AUTO: 11 FL (ref 9.2–11.8)
POTASSIUM SERPL-SCNC: 3.7 MMOL/L (ref 3.5–5.5)
PROT SERPL-MCNC: 5.7 G/DL (ref 6.4–8.2)
RBC # BLD AUTO: 3.48 M/UL (ref 4.2–5.3)
SODIUM SERPL-SCNC: 140 MMOL/L (ref 136–145)
WBC # BLD AUTO: 16.4 K/UL (ref 4.6–13.2)

## 2017-02-08 PROCEDURE — 74011250637 HC RX REV CODE- 250/637: Performed by: ORTHOPAEDIC SURGERY

## 2017-02-08 PROCEDURE — 85027 COMPLETE CBC AUTOMATED: CPT | Performed by: HOSPITALIST

## 2017-02-08 PROCEDURE — 74011000250 HC RX REV CODE- 250: Performed by: ORTHOPAEDIC SURGERY

## 2017-02-08 PROCEDURE — 97110 THERAPEUTIC EXERCISES: CPT

## 2017-02-08 PROCEDURE — 74011250637 HC RX REV CODE- 250/637: Performed by: PHYSICIAN ASSISTANT

## 2017-02-08 PROCEDURE — 82962 GLUCOSE BLOOD TEST: CPT

## 2017-02-08 PROCEDURE — 97530 THERAPEUTIC ACTIVITIES: CPT

## 2017-02-08 PROCEDURE — 65310000000 HC RM PRIVATE REHAB

## 2017-02-08 PROCEDURE — 74011250637 HC RX REV CODE- 250/637: Performed by: INTERNAL MEDICINE

## 2017-02-08 PROCEDURE — 74011000250 HC RX REV CODE- 250: Performed by: PHYSICIAN ASSISTANT

## 2017-02-08 PROCEDURE — 80053 COMPREHEN METABOLIC PANEL: CPT | Performed by: HOSPITALIST

## 2017-02-08 PROCEDURE — 74011250637 HC RX REV CODE- 250/637: Performed by: HOSPITALIST

## 2017-02-08 PROCEDURE — 94640 AIRWAY INHALATION TREATMENT: CPT

## 2017-02-08 PROCEDURE — 74011636637 HC RX REV CODE- 636/637: Performed by: PHYSICIAN ASSISTANT

## 2017-02-08 PROCEDURE — 71010 XR CHEST PORT: CPT

## 2017-02-08 PROCEDURE — 36415 COLL VENOUS BLD VENIPUNCTURE: CPT | Performed by: HOSPITALIST

## 2017-02-08 RX ORDER — ENOXAPARIN SODIUM 100 MG/ML
40 INJECTION SUBCUTANEOUS DAILY
Qty: 21 SYRINGE | Refills: 0 | Status: ON HOLD
Start: 2017-02-08 | End: 2017-02-09 | Stop reason: CLARIF

## 2017-02-08 RX ORDER — MAGNESIUM SULFATE 100 %
4 CRYSTALS MISCELLANEOUS AS NEEDED
Status: DISCONTINUED | OUTPATIENT
Start: 2017-02-08 | End: 2017-02-17 | Stop reason: HOSPADM

## 2017-02-08 RX ORDER — LORATADINE 10 MG/1
10 TABLET ORAL DAILY
Status: DISCONTINUED | OUTPATIENT
Start: 2017-02-09 | End: 2017-02-17 | Stop reason: HOSPADM

## 2017-02-08 RX ORDER — ALLOPURINOL 100 MG/1
300 TABLET ORAL DAILY
Status: DISCONTINUED | OUTPATIENT
Start: 2017-02-09 | End: 2017-02-17 | Stop reason: HOSPADM

## 2017-02-08 RX ORDER — ACETAMINOPHEN 325 MG/1
650 TABLET ORAL
Status: DISCONTINUED | OUTPATIENT
Start: 2017-02-08 | End: 2017-02-17 | Stop reason: HOSPADM

## 2017-02-08 RX ORDER — ARFORMOTEROL TARTRATE 15 UG/2ML
15 SOLUTION RESPIRATORY (INHALATION)
Status: DISPENSED | OUTPATIENT
Start: 2017-02-08 | End: 2017-02-10

## 2017-02-08 RX ORDER — DIPHENHYDRAMINE HCL 25 MG
25 CAPSULE ORAL
Status: DISCONTINUED | OUTPATIENT
Start: 2017-02-08 | End: 2017-02-17 | Stop reason: HOSPADM

## 2017-02-08 RX ORDER — LORATADINE 10 MG/1
10 TABLET ORAL DAILY
Status: DISCONTINUED | OUTPATIENT
Start: 2017-02-08 | End: 2017-02-08 | Stop reason: HOSPADM

## 2017-02-08 RX ORDER — CELECOXIB 100 MG/1
100 CAPSULE ORAL 2 TIMES DAILY WITH MEALS
Status: DISCONTINUED | OUTPATIENT
Start: 2017-02-09 | End: 2017-02-17 | Stop reason: HOSPADM

## 2017-02-08 RX ORDER — OXYCODONE AND ACETAMINOPHEN 5; 325 MG/1; MG/1
1-2 TABLET ORAL
Qty: 60 TAB | Refills: 0 | Status: ON HOLD | OUTPATIENT
Start: 2017-02-08 | End: 2017-02-09 | Stop reason: CLARIF

## 2017-02-08 RX ORDER — ALBUTEROL SULFATE 0.83 MG/ML
2.5 SOLUTION RESPIRATORY (INHALATION)
Status: DISCONTINUED | OUTPATIENT
Start: 2017-02-08 | End: 2017-02-17 | Stop reason: HOSPADM

## 2017-02-08 RX ORDER — THERA TABS 400 MCG
1 TAB ORAL DAILY
Status: DISCONTINUED | OUTPATIENT
Start: 2017-02-09 | End: 2017-02-17 | Stop reason: HOSPADM

## 2017-02-08 RX ORDER — SIMVASTATIN 20 MG/1
20 TABLET, FILM COATED ORAL
Status: DISCONTINUED | OUTPATIENT
Start: 2017-02-08 | End: 2017-02-17 | Stop reason: HOSPADM

## 2017-02-08 RX ORDER — AMLODIPINE BESYLATE 5 MG/1
5 TABLET ORAL DAILY
Status: DISCONTINUED | OUTPATIENT
Start: 2017-02-09 | End: 2017-02-17 | Stop reason: HOSPADM

## 2017-02-08 RX ORDER — PERPHENAZINE 4 MG/1
4 TABLET, FILM COATED ORAL 2 TIMES DAILY
Status: DISCONTINUED | OUTPATIENT
Start: 2017-02-08 | End: 2017-02-09

## 2017-02-08 RX ORDER — INSULIN LISPRO 100 [IU]/ML
INJECTION, SOLUTION INTRAVENOUS; SUBCUTANEOUS
Status: DISCONTINUED | OUTPATIENT
Start: 2017-02-09 | End: 2017-02-17 | Stop reason: HOSPADM

## 2017-02-08 RX ORDER — DOCUSATE SODIUM 100 MG/1
100 CAPSULE, LIQUID FILLED ORAL 2 TIMES DAILY
Status: DISCONTINUED | OUTPATIENT
Start: 2017-02-08 | End: 2017-02-13

## 2017-02-08 RX ORDER — FENTANYL 50 UG/1
1 PATCH TRANSDERMAL
Status: DISCONTINUED | OUTPATIENT
Start: 2017-02-09 | End: 2017-02-17 | Stop reason: HOSPADM

## 2017-02-08 RX ORDER — OXYCODONE AND ACETAMINOPHEN 7.5; 325 MG/1; MG/1
1-2 TABLET ORAL
Status: DISCONTINUED | OUTPATIENT
Start: 2017-02-08 | End: 2017-02-17 | Stop reason: HOSPADM

## 2017-02-08 RX ORDER — BUDESONIDE 0.5 MG/2ML
500 INHALANT ORAL
Status: DISPENSED | OUTPATIENT
Start: 2017-02-08 | End: 2017-02-10

## 2017-02-08 RX ORDER — THEOPHYLLINE 300 MG/1
300 TABLET, EXTENDED RELEASE ORAL EVERY 12 HOURS
Status: DISCONTINUED | OUTPATIENT
Start: 2017-02-08 | End: 2017-02-09

## 2017-02-08 RX ORDER — SERTRALINE HYDROCHLORIDE 50 MG/1
100 TABLET, FILM COATED ORAL DAILY
Status: DISCONTINUED | OUTPATIENT
Start: 2017-02-09 | End: 2017-02-17 | Stop reason: HOSPADM

## 2017-02-08 RX ORDER — METFORMIN HYDROCHLORIDE 500 MG/1
500 TABLET ORAL 2 TIMES DAILY WITH MEALS
Status: DISCONTINUED | OUTPATIENT
Start: 2017-02-09 | End: 2017-02-10

## 2017-02-08 RX ORDER — BISACODYL 5 MG
10 TABLET, DELAYED RELEASE (ENTERIC COATED) ORAL
Status: DISCONTINUED | OUTPATIENT
Start: 2017-02-08 | End: 2017-02-17 | Stop reason: HOSPADM

## 2017-02-08 RX ORDER — ENOXAPARIN SODIUM 100 MG/ML
40 INJECTION SUBCUTANEOUS EVERY 24 HOURS
Status: DISCONTINUED | OUTPATIENT
Start: 2017-02-09 | End: 2017-02-17 | Stop reason: HOSPADM

## 2017-02-08 RX ORDER — DEXTROSE 50 % IN WATER (D50W) INTRAVENOUS SYRINGE
25-50 AS NEEDED
Status: DISCONTINUED | OUTPATIENT
Start: 2017-02-08 | End: 2017-02-17 | Stop reason: HOSPADM

## 2017-02-08 RX ORDER — FAMOTIDINE 20 MG/1
20 TABLET, FILM COATED ORAL 2 TIMES DAILY
Status: DISCONTINUED | OUTPATIENT
Start: 2017-02-08 | End: 2017-02-17 | Stop reason: HOSPADM

## 2017-02-08 RX ORDER — NITROGLYCERIN 0.4 MG/1
0.4 TABLET SUBLINGUAL AS NEEDED
Status: DISCONTINUED | OUTPATIENT
Start: 2017-02-08 | End: 2017-02-17 | Stop reason: HOSPADM

## 2017-02-08 RX ADMIN — SERTRALINE HYDROCHLORIDE 100 MG: 50 TABLET ORAL at 09:21

## 2017-02-08 RX ADMIN — INSULIN LISPRO 2 UNITS: 100 INJECTION, SOLUTION INTRAVENOUS; SUBCUTANEOUS at 17:07

## 2017-02-08 RX ADMIN — OXYCODONE HYDROCHLORIDE AND ACETAMINOPHEN 2 TABLET: 5; 325 TABLET ORAL at 17:02

## 2017-02-08 RX ADMIN — AMLODIPINE BESYLATE 5 MG: 5 TABLET ORAL at 09:20

## 2017-02-08 RX ADMIN — RANITIDINE 150 MG: 150 TABLET, FILM COATED ORAL at 17:03

## 2017-02-08 RX ADMIN — PERPHENAZINE 4 MG: 2 TABLET, FILM COATED ORAL at 12:13

## 2017-02-08 RX ADMIN — SIMVASTATIN 20 MG: 20 TABLET, FILM COATED ORAL at 22:28

## 2017-02-08 RX ADMIN — IPRATROPIUM BROMIDE AND ALBUTEROL SULFATE 3 ML: .5; 3 SOLUTION RESPIRATORY (INHALATION) at 09:10

## 2017-02-08 RX ADMIN — DOCUSATE SODIUM 100 MG: 100 CAPSULE, LIQUID FILLED ORAL at 22:28

## 2017-02-08 RX ADMIN — FAMOTIDINE 20 MG: 20 TABLET ORAL at 22:28

## 2017-02-08 RX ADMIN — Medication 10 ML: at 05:44

## 2017-02-08 RX ADMIN — RANITIDINE 150 MG: 150 TABLET, FILM COATED ORAL at 09:20

## 2017-02-08 RX ADMIN — OXYCODONE HYDROCHLORIDE AND ACETAMINOPHEN 2 TABLET: 7.5; 325 TABLET ORAL at 22:27

## 2017-02-08 RX ADMIN — THEOPHYLLINE 300 MG: 200 TABLET, EXTENDED RELEASE ORAL at 12:59

## 2017-02-08 RX ADMIN — BUSPIRONE HYDROCHLORIDE 30 MG: 5 TABLET ORAL at 09:22

## 2017-02-08 RX ADMIN — ALLOPURINOL 100 MG: 100 TABLET ORAL at 09:20

## 2017-02-08 RX ADMIN — BUDESONIDE 500 MCG: 0.5 INHALANT RESPIRATORY (INHALATION) at 09:11

## 2017-02-08 RX ADMIN — OXYCODONE HYDROCHLORIDE AND ACETAMINOPHEN 2 TABLET: 5; 325 TABLET ORAL at 02:40

## 2017-02-08 RX ADMIN — DOCUSATE SODIUM 100 MG: 100 CAPSULE, LIQUID FILLED ORAL at 17:02

## 2017-02-08 RX ADMIN — OXYCODONE HYDROCHLORIDE AND ACETAMINOPHEN 2 TABLET: 5; 325 TABLET ORAL at 12:59

## 2017-02-08 RX ADMIN — ARFORMOTEROL TARTRATE 15 MCG: 15 SOLUTION RESPIRATORY (INHALATION) at 09:10

## 2017-02-08 RX ADMIN — INSULIN LISPRO 2 UNITS: 100 INJECTION, SOLUTION INTRAVENOUS; SUBCUTANEOUS at 12:14

## 2017-02-08 RX ADMIN — THEOPHYLLINE 300 MG: 200 TABLET, EXTENDED RELEASE ORAL at 18:00

## 2017-02-08 RX ADMIN — LORATADINE 10 MG: 10 TABLET ORAL at 09:22

## 2017-02-08 RX ADMIN — Medication 10 ML: at 14:39

## 2017-02-08 RX ADMIN — IPRATROPIUM BROMIDE AND ALBUTEROL SULFATE 3 ML: .5; 3 SOLUTION RESPIRATORY (INHALATION) at 02:41

## 2017-02-08 RX ADMIN — DOCUSATE SODIUM 100 MG: 100 CAPSULE, LIQUID FILLED ORAL at 09:19

## 2017-02-08 RX ADMIN — OXYCODONE HYDROCHLORIDE AND ACETAMINOPHEN 2 TABLET: 5; 325 TABLET ORAL at 09:21

## 2017-02-08 RX ADMIN — CELECOXIB 200 MG: 100 CAPSULE ORAL at 12:13

## 2017-02-08 RX ADMIN — CELECOXIB 200 MG: 100 CAPSULE ORAL at 17:02

## 2017-02-08 NOTE — PROGRESS NOTES
Problem: Mobility Impaired (Adult and Pediatric)  Goal: *Acute Goals and Plan of Care (Insert Text)  Physical Therapy Goals  Initiated 2/7/2017 and to be accomplished within 7 day(s)  1. Patient will move from supine to sit and sit to supine , scoot up and down and roll side to side in bed with modified independence. 2. Patient will transfer from bed to chair and chair to bed with modified independence using the least restrictive device. 3. Patient will perform sit to stand with modified independence. 4. Patient will ambulate with modified independence for 300 feet with the least restrictive device. 5. Patient will ascend/descend 4 stairs with handrail(s) with supervision/set-up to egress home . PHYSICAL THERAPY TREATMENT     Patient: Tressa Stiles (89 y.o. female)  Date: 2/8/2017  Diagnosis: [id number] osteoarthirtis  Osteoarthritis of left hip Osteoarthritis of left hip  Procedure(s) (LRB):  left total hip replacement - hardinge approach right lateral decubitus position (Left) 2 Days Post-Op  Precautions: Fall, WBAT, Total hip  Chart, physical therapy assessment, plan of care and goals were reviewed. ASSESSMENT:  Patient in bed when arrived to room. Patient reported she had just received pain meds and was in the chair too long today. She refused sitting EOB or ambulation. Patient did agree supine TE, which included ankle pumps, heel slides, glut squeezes, and hip abd 10x. AAROM on R LE with PROM/AAROM on L LE. Patient was not on supplement O2 and no SOB noted during exercise. Progression toward goals:  [ ]      Improving appropriately and progressing toward goals  [X]      Improving slowly and progressing toward goals  [ ]      Not making progress toward goals and plan of care will be adjusted       PLAN:  Patient continues to benefit from skilled intervention to address the above impairments. Continue treatment per established plan of care.   Discharge Recommendations:  Inpatient Rehab  Further Equipment Recommendations for Discharge:  rolling walker brace for R knee       SUBJECTIVE:   Patient stated I am not doing anything now. I have been up too long and I just got my pain meds. \"      OBJECTIVE DATA SUMMARY:   Critical Behavior:  Neurologic State: Alert  Orientation Level: Oriented X4  Cognition: Appropriate for age attention/concentration  Safety/Judgement: Fall prevention  Functional Mobility Training:  Bed Mobility:     Supine to Sit: Moderate assistance;Assist x2 (w/HOB raised and SRs)                          Transfers:  Sit to Stand: Minimum assistance;Assist x2           Bed to Chair: Minimum assistance;Assist x2 (w/standard walker)                    Balance:  Sitting: Impaired  Sitting - Static: Good (unsupported)  Sitting - Dynamic: Fair (occasional)  Standing: With support  Standing - Static: Fair  Standing - Dynamic : Fair      Therapeutic Exercises:   Supine TE included ankle pumps, heel slides, glut squeezes, and hip abd. 10x L with AROM/AAROM L AAROM/PROM  Pain:  Pain Scale 1: Numeric (0 - 10)  Pain Intensity 1: 6  Pain Location 1: Hip  Pain Orientation 1: Right  Pain Description 1: Constant  Pain Intervention(s) 1: Medication (see MAR); Repositioned; Therapeutic touch; Ice  Activity Tolerance:   Fair -  Please refer to the flowsheet for vital signs taken during this treatment.   After treatment:   [ ] Patient left in no apparent distress sitting up in chair  [X] Patient left in no apparent distress in bed  [X] Call bell left within reach  [ ] Nursing notified  [ ] Caregiver present  [ ] Bed alarm activated      Pamela Solis PTA   Time Calculation: 13 mins

## 2017-02-08 NOTE — DISCHARGE SUMMARY
Orthopaedics    Patient without complaints status post HIP ARTHROPLASTY TOTAL for [id number] osteoarthirtis  Osteoarthritis of left hip 2/6/2017. Voiding, ambulating, tolerating diet. No complications during hospital stay and cleared for discharge . Past Medical History   Diagnosis Date    Arthritis      all over the body    Asthma     Chronic left hip pain     Chronic pain     Diabetes ([facility] [address] [facility]     Hip pain, right     Hypertension     Morbid obesity ([facility] [address] [facility]     Psychiatric disorder      anxiety and depression       Visit Vitals    /83 (BP 1 Location: Left arm, BP Patient Position: At rest)    Pulse 95    Temp 97.6 °F (36.4 °C)    Resp 16    Ht 5' 3\" (1.6 m)    Wt 103.4 kg (228 lb)    SpO2 (!) 88%    BMI 40.39 kg/m2       CBC w/Diff    Lab Results   Component Value Date/Time    WBC 16.4 (H) 02/08/2017 04:25 AM    RBC 3.48 (L) 02/08/2017 04:25 AM    HCT 32.1 (L) 02/08/2017 04:25 AM    MCV 92.2 02/08/2017 04:25 AM    MCH 30.2 02/08/2017 04:25 AM    MCHC 32.7 02/08/2017 04:25 AM    RDW 14.4 02/08/2017 04:25 AM    Lab Results   Component Value Date/Time    MONOS 3 02/06/2017 12:33 PM    EOS 5 02/06/2017 12:33 PM    BASOS 0 02/06/2017 12:33 PM    RDW 14.4 02/08/2017 04:25 AM        Current Discharge Medication List      START taking these medications    Details   oxyCODONE-acetaminophen (PERCOCET) 5-325 mg per tablet Take 1-2 Tabs by mouth every four (4) hours as needed. Max Daily Amount: 12 Tabs. Qty: 60 Tab, Refills: 0      enoxaparin (LOVENOX) 40 mg/0.4 mL 0.4 mL by SubCUTAneous route daily for 21 days. Qty: 21 Syringe, Refills: 0         CONTINUE these medications which have NOT CHANGED    Details   albuterol (VENTOLIN HFA) 90 mcg/actuation inhaler Take  by inhalation. diazepam (VALIUM) 10 mg tablet Take 10 mg by mouth every six (6) hours as needed for Anxiety. oxyCODONE-acetaminophen (PERCOCET 10)  mg per tablet Take 1 Tab by mouth every eight (8) hours as needed for Pain. amLODIPine (NORVASC) 5 mg tablet Take 5 mg by mouth daily. ranitidine (ZANTAC) 150 mg tablet Take 150 mg by mouth two (2) times a day. cetirizine (ZYRTEC) 10 mg tablet Take  by mouth.      metFORMIN (GLUCOPHAGE) 1,000 mg tablet Take 1,000 mg by mouth two (2) times daily (with meals). busPIRone (BUSPAR) 30 mg tablet Take 30 mg by mouth daily. simvastatin (ZOCOR) 20 mg tablet Take 20 mg by mouth nightly. theophylline ER,12 hour, (THEOCHRON) 300 mg tablet Take  by mouth two (2) times a day. perphenazine (TRILAFON) 4 mg tablet Take 4 mg by mouth two (2) times a day. sertraline (ZOLOFT) 100 mg tablet Take 100 mg by mouth daily. fluticasone-salmeterol (ADVAIR) 100-50 mcg/dose diskus inhaler Take 1 Puff by inhalation every twelve (12) hours. allopurinol (ZYLOPRIM) 300 mg tablet Take 100 mg by mouth daily. multivitamin, tx-iron-ca-min (THERA-M W/ IRON) 9 mg iron-400 mcg tab tablet Take 1 Tab by mouth daily. diphenhydrAMINE (BENADRYL) 25 mg capsule Take 25 mg by mouth every six (6) hours as needed. nitroglycerin (NITROSTAT) 0.4 mg SL tablet 0.4 mg by SubLINGual route every five (5) minutes as needed for Chest Pain. STOP taking these medications       ibuprofen (MOTRIN) 800 mg tablet Comments:   Reason for Stopping:         fentaNYL (DURAGESIC) 50 mcg/hr PATCH Comments:   Reason for Stopping:               Physical exam: aaox3, surgical dressing dry. NVI Bilateral Lower Extremities. BLE compartments soft and nontender. Assessment:  Status post HIP ARTHROPLASTY TOTAL for [id number] osteoarthirtis  Osteoarthritis of left hip,  doing well. PLAN:  Discharge to snf. Followup in the office in 4 weeks. Will take  percocet prn pain. lovenox for 3 weeks for dvt prophylaxis. Staples out in 14 days post-op. wbat with walker,ASIA Estevez  February 8, 2017

## 2017-02-08 NOTE — PROGRESS NOTES
8341: AM rounds complete. Pt OOB in chair. Pain is better controlled today. Pt plans for d/c to acute rehab vs home possibly today. ASIA Menezes at bedside discussing plan of care. Pt denies needs. Call bell within reach.

## 2017-02-08 NOTE — PROGRESS NOTES
Problem: Self Care Deficits Care Plan (Adult)  Goal: *Acute Goals and Plan of Care (Insert Text)  Occupational Therapy Goals  Initiated 2/7/2017 within 7 day(s). 1. Patient will perform grooming tasks while standing at sink with supervision/set-up   2. Patient will perform lower body dressing with modified independence. 3. Patient will perform bathing with minimal assistance/contact guard assist.  4. Patient will perform toilet transfers with supervision/set-up. 5. Patient will perform all aspects of toileting with supervision/set-up. 6. Patient will participate in upper extremity therapeutic exercise/activities with supervision/set-up for 8 minutes. 7. Patient will utilize energy conservation techniques during functional activities with verbal cues. Outcome: Progressing Towards Goal  OCCUPATIONAL THERAPY TREATMENT     Patient: nEe Hess (30 y.o. female)  Date: 2/8/2017  Diagnosis: [id number] osteoarthirtis  Osteoarthritis of left hip Osteoarthritis of left hip  Procedure(s) (LRB):  left total hip replacement - hardinge approach right lateral decubitus position (Left) 2 Days Post-Op  Precautions: Fall, WBAT, Total hip  Chart, occupational therapy assessment, plan of care, and goals were reviewed. ASSESSMENT:  Pt finishing breathing tx upon entry. Co-treated w/PT to maximize safety w/functional mobility/transfers. Pt requires encouragement for participation. 2 person assist required for functional transfer to chair 2/2 c/o LLE pain (quadricep) and R knee pain. Pt O2 sats drop to 88 on RA s/p activity, recovers to 95% on 2LO2 nc. Pt demonstrates energy conservation techniques w/simple ADL grooming tasks and good UE strength w/UE TherEx using theraband.    EDUCATION Reviewed THPs and importance OOB for all meals  Progression toward goals:  [X]          Improving appropriately and progressing toward goals  [ ]          Improving slowly and progressing toward goals  [ ]          Not making progress toward goals and plan of care will be adjusted       PLAN:  Patient continues to benefit from skilled intervention to address the above impairments. Continue treatment per established plan of care. Discharge Recommendations:  Inpatient Rehab vs Skilled Nursing Facility  Further Equipment Recommendations for Discharge:   TBD by next level of care       SUBJECTIVE:   Patient stated I've already been up once today.       OBJECTIVE DATA SUMMARY:         Cognitive/Behavioral Status:  Neurologic State: Alert  Orientation Level: Oriented X4  Cognition: Appropriate for age attention/concentration, Follows commands  Safety/Judgement: Fall prevention  Functional Mobility and Transfers for ADLs:              Bed Mobility:  Supine to Sit: Moderate assistance;Assist x2 (w/HOB raised and SRs)              Transfers:  Sit to Stand: Minimum assistance;Assist x2 (w/standard walker)  Bed to Chair: Minimum assistance;Assist x2 (w/standard walker)  Balance:  Sitting: Impaired  Sitting - Static: Good (unsupported)  Sitting - Dynamic: Fair (occasional)  Standing: With support  Standing - Static: Fair  Standing - Dynamic : Fair  ADL Intervention:  Grooming  Washing Face: Supervision/set-up  Washing Hands: Supervision/set-up     Therapeutic Exercises:   BUE TherEx w/red theraband 10x in multiple planes     Pain:  Pre Treatment:6  Post Treatment:5  Pain Scale 1: Numeric (0 - 10)  Pain Intensity 1: 8  Pain Location 1: Hip  Pain Orientation 1: Left  Pain Description 1: Aching  Pain Intervention(s) 1: Medication (see MAR); Repositioned; Rest     Activity Tolerance:    Fair     Please refer to the flowsheet for vital signs taken during this treatment.   After treatment:   [X]  Patient left in no apparent distress sitting up in chair  [ ]  Patient left in no apparent distress in bed  [X]  Call bell left within reach  [X]  Nursing notified  [X]  Caregiver present  [ ]  Bed alarm activated     EVERETT Alves  Time Calculation: 25 mins

## 2017-02-08 NOTE — PROGRESS NOTES
Problem: Mobility Impaired (Adult and Pediatric)  Goal: *Acute Goals and Plan of Care (Insert Text)  Physical Therapy Goals  Initiated 2/7/2017 and to be accomplished within 7 day(s)  1. Patient will move from supine to sit and sit to supine , scoot up and down and roll side to side in bed with modified independence. 2. Patient will transfer from bed to chair and chair to bed with modified independence using the least restrictive device. 3. Patient will perform sit to stand with modified independence. 4. Patient will ambulate with modified independence for 300 feet with the least restrictive device. 5. Patient will ascend/descend 4 stairs with handrail(s) with supervision/set-up to egress home . PHYSICAL THERAPY TREATMENT     Patient: Marsha Gutiérrez (1 y.o. female)  Date: 2/8/2017  Diagnosis: [id number] osteoarthirtis  Osteoarthritis of left hip Osteoarthritis of left hip  Procedure(s) (LRB):  left total hip replacement - hardinge approach right lateral decubitus position (Left) 2 Days Post-Op  Precautions: Fall, WBAT, Total hip  Chart, physical therapy assessment, plan of care and goals were reviewed. ASSESSMENT:  Patient pleasant and oriented x4. Patient in bed and finished up breathing treatment. Supine to sit with mod A  X 2. EOB no supplement O2:O2 stats 91%. Patient sit<>stand with Jarred x 2 with VCS to improve posture awareness. Pt. amb slowly with RW with mod A x 1 to chair 15 feet. Patient stand to sit in chair with min A  X2 and additional time. Patient O2 sitting after session 90%. approx 2 minute rest return to 96% with @L supplement O2. Progression toward goals:  [ ]      Improving appropriately and progressing toward goals  [X]      Improving slowly and progressing toward goals  [ ]      Not making progress toward goals and plan of care will be adjusted       PLAN:  Patient continues to benefit from skilled intervention to address the above impairments.   Continue treatment per established plan of care. Discharge Recommendations:  Inpatient Rehab  Further Equipment Recommendations for Discharge:  bedside commode, rolling walker        SUBJECTIVE:   Patient stated I need my brace, I feel this right knee is going to buckle. \"      OBJECTIVE DATA SUMMARY:   Critical Behavior:  Neurologic State: Alert  Orientation Level: Oriented X4  Cognition: Appropriate for age attention/concentration, Follows commands  Safety/Judgement: Fall prevention  Functional Mobility Training:  Bed Mobility:     Supine to Sit: Moderate assistance;Assist x2 (w/HOB raised and SRs)                          Transfers:  Sit to Stand: Minimum assistance;Assist x2           Bed to Chair: Minimum assistance;Assist x2 (w/standard walker)                    Balance:  Sitting: Impaired  Sitting - Static: Good (unsupported)  Sitting - Dynamic: Fair (occasional)  Standing: With support  Standing - Static: Fair  Standing - Dynamic : Fair  Ambulation/Gait Training:                                          Therapeutic Exercises:   TE in chair MARIANO and josiah, DF/PF 10x  Pain:  Pain Scale 1: Numeric (0 - 10)  Pain Intensity 1: 5  Pain Location 1: Hip  Pain Orientation 1: Left  Pain Description 1: Aching  Pain Intervention(s) 1: Medication (see MAR); Repositioned; Rest  Activity Tolerance:   Fair  Please refer to the flowsheet for vital signs taken during this treatment.   After treatment:   [X] Patient left in no apparent distress sitting up in chair  [ ] Patient left in no apparent distress in bed  [X] Call bell left within reach  [ ] Nursing notified  [X] Caregiver present  [ ] Bed alarm activated      Kendra Mancia PTA   Time Calculation: 23 mins

## 2017-02-08 NOTE — PROGRESS NOTES
Pt left on stretcher with [facility] at 1024 to go to [facility] for routine progression of care. Pt called her son while on stretcher notifying him of discharge.

## 2017-02-08 NOTE — ROUTINE PROCESS
Bedside and Verbal shift change report given to [address] (oncoming nurse) by Gwen Dubin, RN   (offgoing nurse). Report included the following information SBAR, Kardex and MAR.

## 2017-02-08 NOTE — PROGRESS NOTES
Progress Note      Patient: Nathan Rhoades               Sex: female          DOA: 2/6/2017       YOB: 1955      Age:  64 y.o.        LOS:  LOS: 2 days               Subjective:   Pt is feeling much more comfortable today . She has been walking with the aid of a walker . Laurie Michael Her pain is controlled . She was using a hhn when seen this am . She does drop her o2 sats and it is 88 % presently her chest x ray today shows basilar atelectasis . Pt will need to use the incentive spirometry more often . she is on duonebs q 6 hrs        Objective:      Visit Vitals    /75 (BP 1 Location: Left arm, BP Patient Position: At rest)    Pulse 95    Temp 98.1 °F (36.7 °C)    Resp 16    Ht 5' 3\" (1.6 m)    Wt 103.4 kg (228 lb)    SpO2 (!) 88%    BMI 40.39 kg/m2       Physical Exam:  Pt is awake and is alert   Heart reg rate and rhythm   Lungs decreased breath sounds in the bases   Abdomen soft and nontender   Extremities  Moves the left leg but with some discomfort  Neuro nonfocal  Lab/Data Reviewed:  CMP:   Lab Results   Component Value Date/Time     02/08/2017 04:25 AM    K 3.7 02/08/2017 04:25 AM     02/08/2017 04:25 AM    CO2 24 02/08/2017 04:25 AM    AGAP 12 02/08/2017 04:25 AM     (H) 02/08/2017 04:25 AM    BUN 26 (H) 02/08/2017 04:25 AM    CREA 1.01 02/08/2017 04:25 AM    GFRAA >60 02/08/2017 04:25 AM    GFRNA 56 (L) 02/08/2017 04:25 AM    CA 9.2 02/08/2017 04:25 AM    ALB 2.9 (L) 02/08/2017 04:25 AM    TP 5.7 (L) 02/08/2017 04:25 AM    GLOB 2.8 02/08/2017 04:25 AM    AGRAT 1.0 02/08/2017 04:25 AM    SGOT 13 (L) 02/08/2017 04:25 AM    ALT 11 (L) 02/08/2017 04:25 AM     CBC:   Lab Results   Component Value Date/Time    WBC 16.4 (H) 02/08/2017 04:25 AM    HGB 10.5 (L) 02/08/2017 04:25 AM    HCT 32.1 (L) 02/08/2017 04:25 AM     02/08/2017 04:25 AM           Assessment/Plan     Principal Problem:    Osteoarthritis of left hip (2/6/2017)    Active Problems:    Asthma (2/6/2017)      Type 2 diabetes mellitus ([facility] [address] (2/6/2017)      HTN (hypertension) (2/6/2017)      Depression (2/6/2017)      Hyperlipidemia (2/6/2017)        Plan: will need to watch the pulmonary function closely . She needs to use the incentive spirometry more often .

## 2017-02-08 NOTE — PROGRESS NOTES
Patient without new complaints, status post HIP ARTHROPLASTY TOTAL for [id number] osteoarthirtis  Osteoarthritis of left hip   Tolerating diet, working with PT, voiding. Visit Vitals    /83 (BP 1 Location: Left arm, BP Patient Position: At rest)    Pulse 95    Temp 97.6 °F (36.4 °C)    Resp 16    Ht 5' 3\" (1.6 m)    Wt 103.4 kg (228 lb)    SpO2 (!) 88%    BMI 40.39 kg/m2       CBC w/Diff    Lab Results   Component Value Date/Time    WBC 16.4 (H) 02/08/2017 04:25 AM    RBC 3.48 (L) 02/08/2017 04:25 AM    HCT 32.1 (L) 02/08/2017 04:25 AM    MCV 92.2 02/08/2017 04:25 AM    MCH 30.2 02/08/2017 04:25 AM    MCHC 32.7 02/08/2017 04:25 AM    RDW 14.4 02/08/2017 04:25 AM    Lab Results   Component Value Date/Time    MONOS 3 02/06/2017 12:33 PM    EOS 5 02/06/2017 12:33 PM    BASOS 0 02/06/2017 12:33 PM    RDW 14.4 02/08/2017 04:25 AM          Physical exam: aaox3, surgical dressing dry,  bilateral anterior tibialis and gastrocnemius strength 5/5 , palpable distal pulses, sensation intact,  BLE compartments soft  and nontender. Assessment:  Status post HIP ARTHROPLASTY TOTAL for [id number] osteoarthirtis  Osteoarthritis of left hip ,  progressing.     PLAN:  Mobilize with P.T.   DVT ppx-lovenox   Discharge Planning-snf when bed available    ASIA Torres  February 8, 2017

## 2017-02-08 NOTE — PROGRESS NOTES
TRANSFER - OUT REPORT:    Verbal report given to Aaron Caraballo LPN on [facility]  being transferred to [address] [place] [address] Unit for  routine progression of care. Report consisted of patients Situation, Background, Assessment and   Recommendations(SBAR). Information from the following report(s) SBAR, Kard[facility], [facility] and Recent Results was reviewed with the receiving nurse. Lines:   Peripheral IV 02/06/17 Right Forearm (Active)   Site Assessment Clean, dry, & intact 2/7/2017  8:00 PM   Phlebitis Assessment 0 2/7/2017  8:00 PM   Infiltration Assessment 0 2/7/2017  8:00 PM   Dressing Status Clean, dry, & intact 2/7/2017  8:00 PM   Dressing Type Transparent;Tape 2/7/2017  8:00 PM   Hub Color/Line Status Blue;Capped 2/7/2017  8:00 PM   Action Taken Open ports on tubing capped 2/7/2017  8:00 PM   Alcohol Cap Used Yes 2/7/2017  8:00 PM        Opportunity for questions and clarification was provided.

## 2017-02-08 NOTE — PROGRESS NOTES
Received call from Michael Mitchell @ [facility], states they have received [facility] insurance authorization. [facility] medical transport set up for 1830. Pt/family made aware of above, agreeable to transfer. Pt's nurse made aware of above. Available as needed. Pat [address] [address]. Care Management Interventions  PCP Verified by CM:  Yes  Palliative Care Consult (Criteria: CHF and RRAT>21): No  Mode of Transport at Discharge: BLS  Discharge Durable Medical Equipment: No  Physical Therapy Consult: Yes  Occupational Therapy Consult: Yes  Speech Therapy Consult: No  Current Support Network: [place] (lives with her cousin)  Confirm Follow Up Transport: Family  Plan discussed with Pt/Family/Caregiver: Yes  Discharge Location  Discharge Placement: Rehab hospital/unit acute [facility])

## 2017-02-09 PROBLEM — Z51.81 ENCOUNTER FOR MONITORING OF THEOPHYLLINE THERAPY: Status: ACTIVE | Noted: 2017-02-09

## 2017-02-09 PROBLEM — Z79.899 ENCOUNTER FOR MONITORING OF THEOPHYLLINE THERAPY: Status: ACTIVE | Noted: 2017-02-09

## 2017-02-09 PROBLEM — R79.89 PRERENAL AZOTEMIA: Status: ACTIVE | Noted: 2017-02-09

## 2017-02-09 LAB
25(OH)D3 SERPL-MCNC: 30.2 NG/ML (ref 30–100)
ANION GAP BLD CALC-SCNC: 8 MMOL/L (ref 3–18)
APPEARANCE UR: NORMAL
BACTERIA URNS QL MICRO: NEGATIVE /HPF
BASOPHILS # BLD AUTO: 0 K/UL (ref 0–0.1)
BASOPHILS # BLD: 0 % (ref 0–2)
BILIRUB UR QL: NEGATIVE
BUN SERPL-MCNC: 25 MG/DL (ref 7–18)
BUN/CREAT SERPL: 30 (ref 12–20)
CALCIUM SERPL-MCNC: 9.1 MG/DL (ref 8.5–10.1)
CHLORIDE SERPL-SCNC: 107 MMOL/L (ref 100–108)
CO2 SERPL-SCNC: 25 MMOL/L (ref 21–32)
COLOR UR: YELLOW
CREAT SERPL-MCNC: 0.84 MG/DL (ref 0.6–1.3)
DIFFERENTIAL METHOD BLD: ABNORMAL
EOSINOPHIL # BLD: 0.6 K/UL (ref 0–0.4)
EOSINOPHIL NFR BLD: 4 % (ref 0–5)
EPITH CASTS URNS QL MICRO: NORMAL /LPF (ref 0–5)
ERYTHROCYTE [DISTWIDTH] IN BLOOD BY AUTOMATED COUNT: 14.4 % (ref 11.6–14.5)
FERRITIN SERPL-MCNC: 215 NG/ML (ref 8–388)
GLUCOSE BLD STRIP.AUTO-MCNC: 131 MG/DL (ref 70–110)
GLUCOSE BLD STRIP.AUTO-MCNC: 146 MG/DL (ref 70–110)
GLUCOSE BLD STRIP.AUTO-MCNC: 152 MG/DL (ref 70–110)
GLUCOSE BLD STRIP.AUTO-MCNC: 177 MG/DL (ref 70–110)
GLUCOSE BLD STRIP.AUTO-MCNC: 179 MG/DL (ref 70–110)
GLUCOSE SERPL-MCNC: 138 MG/DL (ref 74–99)
GLUCOSE UR STRIP.AUTO-MCNC: NEGATIVE MG/DL
HCT VFR BLD AUTO: 29.6 % (ref 35–45)
HGB BLD-MCNC: 9.9 G/DL (ref 12–16)
HGB UR QL STRIP: NEGATIVE
IRON SATN MFR SERPL: 8 %
IRON SERPL-MCNC: 16 UG/DL (ref 50–175)
KETONES UR QL STRIP.AUTO: NEGATIVE MG/DL
LEUKOCYTE ESTERASE UR QL STRIP.AUTO: NEGATIVE
LYMPHOCYTES # BLD AUTO: 9 % (ref 21–52)
LYMPHOCYTES # BLD: 1.4 K/UL (ref 0.9–3.6)
MAGNESIUM SERPL-MCNC: 2 MG/DL (ref 1.8–2.4)
MCH RBC QN AUTO: 30.8 PG (ref 24–34)
MCHC RBC AUTO-ENTMCNC: 33.4 G/DL (ref 31–37)
MCV RBC AUTO: 92.2 FL (ref 74–97)
MONOCYTES # BLD: 1.1 K/UL (ref 0.05–1.2)
MONOCYTES NFR BLD AUTO: 7 % (ref 3–10)
NEUTS SEG # BLD: 13 K/UL (ref 1.8–8)
NEUTS SEG NFR BLD AUTO: 80 % (ref 40–73)
NITRITE UR QL STRIP.AUTO: NEGATIVE
PH UR STRIP: 5.5 [PH] (ref 5–8)
PLATELET # BLD AUTO: 235 K/UL (ref 135–420)
PMV BLD AUTO: 11.8 FL (ref 9.2–11.8)
POTASSIUM SERPL-SCNC: 3.7 MMOL/L (ref 3.5–5.5)
PROT UR STRIP-MCNC: NEGATIVE MG/DL
RBC # BLD AUTO: 3.21 M/UL (ref 4.2–5.3)
RBC #/AREA URNS HPF: NORMAL /HPF (ref 0–5)
RETICS/RBC NFR AUTO: 1.3 % (ref 0.5–2.3)
SODIUM SERPL-SCNC: 140 MMOL/L (ref 136–145)
SP GR UR REFRACTOMETRY: 1.01 (ref 1–1.03)
THEOPHYLLINE SERPL-MCNC: 21 UG/ML
TIBC SERPL-MCNC: 210 UG/DL (ref 250–450)
URATE SERPL-MCNC: 6.1 MG/DL (ref 2.6–7.2)
UROBILINOGEN UR QL STRIP.AUTO: 0.2 EU/DL (ref 0.2–1)
WBC # BLD AUTO: 16.1 K/UL (ref 4.6–13.2)
WBC URNS QL MICRO: NORMAL /HPF (ref 0–4)

## 2017-02-09 PROCEDURE — 80048 BASIC METABOLIC PNL TOTAL CA: CPT | Performed by: INTERNAL MEDICINE

## 2017-02-09 PROCEDURE — 84550 ASSAY OF BLOOD/URIC ACID: CPT | Performed by: INTERNAL MEDICINE

## 2017-02-09 PROCEDURE — 74011250636 HC RX REV CODE- 250/636: Performed by: INTERNAL MEDICINE

## 2017-02-09 PROCEDURE — 82728 ASSAY OF FERRITIN: CPT | Performed by: INTERNAL MEDICINE

## 2017-02-09 PROCEDURE — 97530 THERAPEUTIC ACTIVITIES: CPT

## 2017-02-09 PROCEDURE — 74011000250 HC RX REV CODE- 250: Performed by: INTERNAL MEDICINE

## 2017-02-09 PROCEDURE — 97167 OT EVAL HIGH COMPLEX 60 MIN: CPT

## 2017-02-09 PROCEDURE — 36415 COLL VENOUS BLD VENIPUNCTURE: CPT | Performed by: INTERNAL MEDICINE

## 2017-02-09 PROCEDURE — 74011636637 HC RX REV CODE- 636/637: Performed by: INTERNAL MEDICINE

## 2017-02-09 PROCEDURE — 97535 SELF CARE MNGMENT TRAINING: CPT

## 2017-02-09 PROCEDURE — 80198 ASSAY OF THEOPHYLLINE: CPT | Performed by: INTERNAL MEDICINE

## 2017-02-09 PROCEDURE — 97162 PT EVAL MOD COMPLEX 30 MIN: CPT

## 2017-02-09 PROCEDURE — 65310000000 HC RM PRIVATE REHAB

## 2017-02-09 PROCEDURE — 74011250637 HC RX REV CODE- 250/637: Performed by: INTERNAL MEDICINE

## 2017-02-09 PROCEDURE — 82962 GLUCOSE BLOOD TEST: CPT

## 2017-02-09 PROCEDURE — 83540 ASSAY OF IRON: CPT | Performed by: INTERNAL MEDICINE

## 2017-02-09 PROCEDURE — 81001 URINALYSIS AUTO W/SCOPE: CPT | Performed by: INTERNAL MEDICINE

## 2017-02-09 PROCEDURE — 85045 AUTOMATED RETICULOCYTE COUNT: CPT | Performed by: INTERNAL MEDICINE

## 2017-02-09 PROCEDURE — 85025 COMPLETE CBC W/AUTO DIFF WBC: CPT | Performed by: INTERNAL MEDICINE

## 2017-02-09 PROCEDURE — 82306 VITAMIN D 25 HYDROXY: CPT | Performed by: INTERNAL MEDICINE

## 2017-02-09 PROCEDURE — 83735 ASSAY OF MAGNESIUM: CPT | Performed by: INTERNAL MEDICINE

## 2017-02-09 RX ORDER — MODAFINIL 100 MG/1
100 TABLET ORAL DAILY
Status: DISCONTINUED | OUTPATIENT
Start: 2017-02-10 | End: 2017-02-15

## 2017-02-09 RX ORDER — OXYCODONE HYDROCHLORIDE 15 MG/1
15 TABLET ORAL
COMMUNITY
End: 2017-07-19

## 2017-02-09 RX ORDER — BUDESONIDE AND FORMOTEROL FUMARATE DIHYDRATE 160; 4.5 UG/1; UG/1
2 AEROSOL RESPIRATORY (INHALATION)
Status: DISCONTINUED | OUTPATIENT
Start: 2017-02-09 | End: 2017-02-17 | Stop reason: HOSPADM

## 2017-02-09 RX ORDER — ASCORBIC ACID 250 MG
250 TABLET ORAL
Status: DISCONTINUED | OUTPATIENT
Start: 2017-02-10 | End: 2017-02-17 | Stop reason: HOSPADM

## 2017-02-09 RX ORDER — THEOPHYLLINE 200 MG/1
200 TABLET, EXTENDED RELEASE ORAL EVERY 12 HOURS
Status: DISCONTINUED | OUTPATIENT
Start: 2017-02-13 | End: 2017-02-17 | Stop reason: HOSPADM

## 2017-02-09 RX ORDER — LANOLIN ALCOHOL/MO/W.PET/CERES
3 CREAM (GRAM) TOPICAL
Status: DISCONTINUED | OUTPATIENT
Start: 2017-02-09 | End: 2017-02-17 | Stop reason: HOSPADM

## 2017-02-09 RX ORDER — MELATONIN
2000 DAILY
Status: DISCONTINUED | OUTPATIENT
Start: 2017-02-09 | End: 2017-02-17 | Stop reason: HOSPADM

## 2017-02-09 RX ORDER — BUSPIRONE HYDROCHLORIDE 5 MG/1
20 TABLET ORAL 3 TIMES DAILY
Status: DISCONTINUED | OUTPATIENT
Start: 2017-02-09 | End: 2017-02-17 | Stop reason: HOSPADM

## 2017-02-09 RX ORDER — IBUPROFEN 800 MG/1
800 TABLET ORAL
COMMUNITY
End: 2017-07-19

## 2017-02-09 RX ORDER — PERPHENAZINE 2 MG/1
2 TABLET, FILM COATED ORAL
Status: DISCONTINUED | OUTPATIENT
Start: 2017-02-09 | End: 2017-02-09 | Stop reason: SDUPTHER

## 2017-02-09 RX ORDER — PERPHENAZINE 2 MG/1
2 TABLET, FILM COATED ORAL
COMMUNITY
End: 2017-07-19

## 2017-02-09 RX ORDER — PERPHENAZINE 2 MG/1
2 TABLET, FILM COATED ORAL
Status: DISCONTINUED | OUTPATIENT
Start: 2017-02-09 | End: 2017-02-17 | Stop reason: HOSPADM

## 2017-02-09 RX ORDER — FENTANYL 50 UG/1
1 PATCH TRANSDERMAL
COMMUNITY
End: 2017-07-19

## 2017-02-09 RX ORDER — LANOLIN ALCOHOL/MO/W.PET/CERES
1 CREAM (GRAM) TOPICAL
Status: DISCONTINUED | OUTPATIENT
Start: 2017-02-10 | End: 2017-02-17 | Stop reason: HOSPADM

## 2017-02-09 RX ADMIN — AMLODIPINE BESYLATE 5 MG: 5 TABLET ORAL at 09:41

## 2017-02-09 RX ADMIN — INSULIN LISPRO 2 UNITS: 100 INJECTION, SOLUTION INTRAVENOUS; SUBCUTANEOUS at 09:42

## 2017-02-09 RX ADMIN — FAMOTIDINE 20 MG: 20 TABLET ORAL at 09:41

## 2017-02-09 RX ADMIN — OXYCODONE HYDROCHLORIDE AND ACETAMINOPHEN 1 TABLET: 7.5; 325 TABLET ORAL at 13:18

## 2017-02-09 RX ADMIN — PERPHENAZINE 4 MG: 4 TABLET, FILM COATED ORAL at 01:09

## 2017-02-09 RX ADMIN — CELECOXIB 100 MG: 100 CAPSULE ORAL at 19:06

## 2017-02-09 RX ADMIN — METFORMIN HYDROCHLORIDE 500 MG: 500 TABLET, FILM COATED ORAL at 09:41

## 2017-02-09 RX ADMIN — INSULIN LISPRO 2 UNITS: 100 INJECTION, SOLUTION INTRAVENOUS; SUBCUTANEOUS at 13:20

## 2017-02-09 RX ADMIN — OXYCODONE HYDROCHLORIDE AND ACETAMINOPHEN 1 TABLET: 7.5; 325 TABLET ORAL at 09:41

## 2017-02-09 RX ADMIN — THERA TABS 1 TABLET: TAB at 09:41

## 2017-02-09 RX ADMIN — FAMOTIDINE 20 MG: 20 TABLET ORAL at 19:06

## 2017-02-09 RX ADMIN — VITAMIN D, TAB 1000IU (100/BT) 2000 UNITS: 25 TAB at 13:24

## 2017-02-09 RX ADMIN — MELATONIN TAB 3 MG 3 MG: 3 TAB at 21:58

## 2017-02-09 RX ADMIN — CELECOXIB 100 MG: 100 CAPSULE ORAL at 09:47

## 2017-02-09 RX ADMIN — ARFORMOTEROL TARTRATE 15 MCG: 15 SOLUTION RESPIRATORY (INHALATION) at 09:40

## 2017-02-09 RX ADMIN — THEOPHYLLINE 300 MG: 300 TABLET, EXTENDED RELEASE ORAL at 01:09

## 2017-02-09 RX ADMIN — LORATADINE 10 MG: 10 TABLET ORAL at 09:41

## 2017-02-09 RX ADMIN — OXYCODONE HYDROCHLORIDE AND ACETAMINOPHEN 2 TABLET: 7.5; 325 TABLET ORAL at 19:08

## 2017-02-09 RX ADMIN — ENOXAPARIN SODIUM 40 MG: 100 INJECTION SUBCUTANEOUS at 06:47

## 2017-02-09 RX ADMIN — SERTRALINE HYDROCHLORIDE 100 MG: 50 TABLET ORAL at 09:41

## 2017-02-09 RX ADMIN — SIMVASTATIN 20 MG: 20 TABLET, FILM COATED ORAL at 21:58

## 2017-02-09 RX ADMIN — BUDESONIDE 500 MCG: 0.5 INHALANT RESPIRATORY (INHALATION) at 01:10

## 2017-02-09 RX ADMIN — BUDESONIDE 500 MCG: 0.5 INHALANT RESPIRATORY (INHALATION) at 09:00

## 2017-02-09 RX ADMIN — DOCUSATE SODIUM 100 MG: 100 CAPSULE, LIQUID FILLED ORAL at 19:06

## 2017-02-09 RX ADMIN — OXYCODONE HYDROCHLORIDE AND ACETAMINOPHEN 2 TABLET: 7.5; 325 TABLET ORAL at 23:20

## 2017-02-09 RX ADMIN — METFORMIN HYDROCHLORIDE 500 MG: 500 TABLET, FILM COATED ORAL at 19:06

## 2017-02-09 RX ADMIN — BUSPIRONE HYDROCHLORIDE 20 MG: 5 TABLET ORAL at 19:11

## 2017-02-09 RX ADMIN — PERPHENAZINE 2 MG: 2 TABLET, FILM COATED ORAL at 23:20

## 2017-02-09 RX ADMIN — DOCUSATE SODIUM 100 MG: 100 CAPSULE, LIQUID FILLED ORAL at 09:41

## 2017-02-09 RX ADMIN — ALLOPURINOL 300 MG: 100 TABLET ORAL at 09:41

## 2017-02-09 RX ADMIN — PERPHENAZINE 4 MG: 4 TABLET, FILM COATED ORAL at 09:41

## 2017-02-09 NOTE — INTERDISCIPLINARY ROUNDS
[facility] PHYSICAL REHABILITATION  [address] [place] [address], [address]    INPATIENT REHABILITATION  TEAM CONFERENCE SUMMARY     Date of Conference: 2/10/2017    Name: Usha Pennington Age / Sex: 64 y.o. / female   CSN: [id number] MRN: [id number]   [facility] Date: 2/8/2017 Length of Stay: 1 day     Primary Rehab Diagnosis  1. Impaired Mobility and ADLs  2. Primary osteoarthritis of the left hip  3. S/P Left total hip replacement (2/6/2017 - Dr. Genesis Amaral)     Comorbidities   Bronchial asthma    Type 2 diabetes mellitus without complication    Essential hypertension    Depression    Dyslipidemia    Decreased calculated glomerular filtration rate (GFR)    Aftercare following left hip joint replacement surgery    Acute blood loss as cause of postoperative anemia    Gout    Allergic rhinitis    Cataract of left eye    Incisional hernia    Lichen simplex chronicus    Paronychia    Chronic alcoholism    Tobacco abuse    Chronic pain syndrome    Obesity, Class III, BMI 40-49.9 (morbid obesity)     Anxiety disorder    Encounter for monitoring of theophylline therapy    Prerenal azotemia         Therapy:     FIM SCORES Initial Assessment Weekly Progress Assessment 2/9/2017   Eating Functional Level: 5  Comments: Pt requires set-up  for opening some containers 2/2 decreased hand strength. Pt has partial dental fixtures. 5   Swallowing     Grooming 5  5   Bathing 1 (Pt stood in shower at home.)  3      Upper Body Dressing Functional Level: 5  Items Applied/Steps Completed: Pullover (4 steps)  Comments: Set-up to provide pt clothing. Pt able to nany/doff pullover shirt while seated. 5   Lower Body Dressing Functional Level: 2  Items Applied/Steps Completed: Sock, left (1 step), Sock, right (1 step), Elastic waist pants (3 steps)  Comments: Pt required assistance to thread pants over LEs and socks over feet.  pt stood with RW/min A to nany pants over hips with assistance for back of pants.  2   Toileting Functional Level: 3  Comments: Pt is able to manage hygiene while seated and stood for clothing management with min A/RW. OT assisted with managing pants over pt's buttocks. 3   Bladder  level of assist 3 2   Bladder  accident frequency score 6     Bowel  level of assist 2 3   Bowel  accident frequency score       Toilet Transfer [place] Toilet Transfer Score: 3  Comments: Pt required min A for stand step transfer from w/c to/from toilet with RW. Pt required mod A for sit to stand from toilet. OT will place [facility] over toilet for increased independence with sit to stand transfers from toilet. 3   Tub/Shower Transfer [place] Tub or Shower Type: Tub/Shower combination  Tub/Shower Transfer Score: 0  Comments: NT 2/2 pt bathed at sink this session. Tub/Shower combination   NT   Comprehension Primary Mode of Comprehension: Auditory  Score: 7 Auditory  6   Expression Primary Mode of Expression: Verbal  Score: 7 Verbal  6   Social Interaction Score: 7 6   Problem Solving Score: 7 5   Memory Score: 7 5     FIM SCORES Initial Assessment Weekly Progress Assessment 2/9/2017   Bed/Chair/Wheelchair Transfers Transfer Type: Other  Other: Patient performs stand step transfer requiring mod a x1 to assist with upright posture, B UE support, weight shift assistance, assisted pivot, controlled lowering, and tactile cueing to improve body position. Patient un-receptive to verbal cueing or assistance to improve form/technique. Patient demonstrates standing squat form with combination pivot and lowering to target surface, consistently reaching for external support to pull-to-stand with STS transition and stepping during pivot/step phase of transfer. Patient non-receptive to verbal cues to prevent reaching. patient with continued lack of receptiveness to education t/o final 3 transfers in session. Transfer Assistance : 3 (Moderate assistance ) Transfer Type:  Other  Other: Patient performs stand step transfer requiring mod a x1 to assist with upright posture, B UE support, weight shift assistance, assisted pivot, controlled lowering, and tactile cueing to improve body position. Patient un-receptive to verbal cueing or assistance to improve form/technique. Patient demonstrates standing squat form with combination pivot and lowering to target surface, consistently reaching for external support to pull-to-stand with STS transition and stepping during pivot/step phase of transfer. Patient non-receptive to verbal cues to prevent reaching. patient with continued lack of receptiveness to education t/o final 3 transfers in session.   Transfer Assistance : 3 (Moderate assistance )   Bed Mobility Rolling Right Max a x 1   Rolling Left DEP   Supine to Sit DEP   Sit to Stand Mod a x 1    Sit to Supine Max a x 1    Rolling Right   Max a x 1    Rolling Left   DEP   Supine to Sit   DEP   Sit to Stand   Mod a x 1   Sit to Supine   Max a x 1      Locomotion (W/C) 2 (SPV w/ B UE technique and LE for steering assist) Function 2  Setup Assistance:  1     Locomotion (W/C distance) 120 Feet (x 2 trials B UE technique w/ B LE for steering assistance)   120 Feet (x 2 trials B UE technique w/ B LE for steering assistance)   Locomotion (Walk) N/A  (N/A)   Locomotion (Walk dist.) 0 Feet (ft) 0 Feet (ft)   Steps/Stairs Pt Unable Pt unable         Nursing:     Neuro:   A&O x____                   Respiratory:   [] WNL   [] O2   [] LPM ______   Other:  Peripheral Vascular:   [] TEDS present   [] Edema present ____ Grade  Cardiac:   [] WNL   [] Other  Genitourinary:   [] continent   [] incontinent   [] hampton  Abdominal _______ LBM  GI: _______ Diet ______ Liquids _____ tube feeds  Musculoskeletal: ____ ROM Transfers _____ Assistive Device Used  ____ Level of Assistance  Skin Integumentary:   [] Intact   [] Not Intact   __________Preventative Measures  Details______________________________________________________________  Pain: [] Controlled [] Not Controlled   Pain Meds:   [] Scheduled   [] PRN        Registered Dietitian / Nutrition:   Patient Vitals for the past 100 hrs:   % Diet Eaten   02/09/17 0942 100 %     Patient reported having good appetite and po intake PTA and since admission    Supplements:          [] Yes   [x] No      Amount of supplement consumed:  Not applicable        Intake/Output Summary (Last 24 hours) at 02/09/17 1209  Last data filed at 02/09/17 0942   Gross per 24 hour   Intake              180 ml   Output                0 ml   Net              180 ml                                Last bowel movement:  2/7      Interdisciplinary Team Goals:     1. Goal  Patient will perform STS transfer w/ min a x1 utilizing hands on sitting surface for self-boost.    Barrier  Weakness, pull-to-stand tendency    Intervention  TE; TA; NMRE     2. Goal  Pt will perform LB dressing with min A using AE. Barrier  Decreased activity tolerance, decreased ADLs, decreased functional mobility    Intervention  ADLs, functional transfers, education     3. Goal      Barrier      Intervention       4. Goal      Barrier      Intervention       5. Goal      Barrier      Intervention       6. Goal  Po intake of meals will meet >75% of patient estimated nutritional needs within the next 5-7 days. Barrier  none known     Intervention  modified diet       Disposition / Discharge Planning: Follow-up therapy services:  tbd   DME recommendations:  tbd   Estimated discharge date:  tbd   Discharge Location:  home         Electronic Signatures:      Signature Date Signed   Physical Therapist    Nan Diaz PT DPT  02/09/2017   Occupational Therapist    Cherie Kennedy [address]  2/9/17   Speech Therapist         Recreational Therapist    Robby Munoz, [address]  2/9/17   Nursing         Dietitian    Brigette Marcial, [address] [place] [address]  2/9/17   Clinical Psychologist         Physician    Blake Barnett.  Luis Donovan MD  2/9/2017       DIOR Bernstein  2/9/17         The above information has been reviewed with the patient in a language that they can understand. Opportunity for comments and questions has been provided and a signed attestation has been scanned into the \"media tab\" of the EMR.       Patient Signature: ______________________________________________________    Date Signed: __________________________________________________________

## 2017-02-09 NOTE — PROGRESS NOTES
Problem: Mobility Impaired (Adult and Pediatric)  Goal: *Acute Goals and Plan of Care (Insert Text)  PHYSICAL THERAPY EXAMINATION  Time In: 2125  Time Out: 5166  Time In: 8672  Time Out: 1225  Patient Name: David Torres  Patient Age: 64 y.o. Patient presents A/O x 4 sitting up in W/C reporting pain w/ movement and demonstrating fair mentation noting that she needs \"a copper fit sleeve\" if she is going to participate in any mobility activities. Sitting BP: 154/82 mmHg. Patient participates in functional assessment as noted below. Picking up object from floor from standing position not performed 2/2 safety, hip precautions, and weakness. Patient appropriate with safety education verbalizing understanding of call bell and indications for use. Patient resistance to education or alteration of ingrained movement patterns stating \"I know what I can do, I am not changing that. \" Patient demonstrates psychosocial factors indicating risk of passivity in therapy. Patient offered time for questions regarding diagnosis/prognosis as it relate to physical recovery and mobility. Patient educated on hip precautions and treatment techniques to improve recovery; patient verbalizes understanding despite poor attention in discussion. Patient positioned lying supine in bed following evaluation; nursing notified of patient status; call bell in reach. Second session, Patient demonstrates continued lack of attendance to verbal cues and resistance to physical assist to adjust movement patterns. Patient continues to move unsafely and ignore cues to improve safety.      Past Medical History:   Past Medical History   Diagnosis Date    Acute blood loss as cause of postoperative anemia 2/7/2017    Allergic rhinitis      Anxiety disorder      Bronchial asthma      Cataract of left eye      Chronic alcoholism (HCC)      Chronic pain syndrome      Decreased calculated glomerular filtration rate (GFR) 2/6/2017       Calculated GFR is equivalent to that of CKD stage 2 = 60-89 ml/min    Depression      Dyslipidemia      Essential hypertension      Gout      Incisional hernia      Lichen simplex chronicus      Obesity, Class III, BMI 40-49.9 (morbid obesity) ([facility] [address]      Paronychia      Primary osteoarthritis of left hip      Tobacco abuse      Type 2 diabetes mellitus without complication (HCC)          Pain at start of tx:10/10 w/ mvmt L hip  Pain at stop of tx:10/10 w/ mvmt L hip     Patient identified with name and : YES     Medical Diagnosis:  Left hip fracture  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      Therapy Diagnosis:   Difficulty with bed mobility  [X]     Difficulty with functional transfers  [X]     Difficulty with ambulation  [X]     Difficulty with stair negotiations  [X]        Problem List:    Decreased strength B LE  [X]     Decreased strength trunk/core  [ ]     Decreased AROM   [X]     Decreased PROM  [X]    Decreased endurance  [X]     Decreased balance sitting  [X]     Decreased balance standing  [X]     Pain   [X]     Slow ambulation velocity  [X]    Decreased coordination  [X]    Decreased safety awareness  [X]       Functional Limitations:   Decreased independence with bed mobility  [X]     Decreased independence with functional transfers  [X]     Decreased independence with ambulation  [X]     Decreased independence with stair negotiation  [X]        Previous Functional Level: Patient reports modified independence in home and community noting that she would occasionally utilize a [facility] to assist with mobility. Patient notes that she was active in the community with hunting, fishing, and traveling the beach[place].      Home Environment: Home Environment: Private residence  # Steps to Enter: 3  One/Two Story Residence: One story  Living Alone: No  Support Systems: Family member(s)  Patient Expects to be Discharged to[de-identified] Private residence  Current DME Used/Available at Home: Walker, Wheelchair  Tub or Shower Type: Tub/Shower combination             Outcome Measures: FIM/MMT                 MMT Initial Assessment    Right Lower Extremity Left Lower Extremity   Hip Flexion 3+ 1   Knee Extension 4 3-   Knee Flexion 4+ 3+   Ankle Dorsiflexion 4 3+   0/5       No palpable muscle contraction  1/5       Palpable muscle contraction, no joint movement  2-/5      Less than full range of motion in gravity eliminated position  2/5       Able to complete full range of motion in gravity eliminated position  2+/5     Able to initiate movement against gravity  3-/5      More than half but not full range of motion against gravity  3/5       Able to complete full range of motion against gravity  3+/5     Completes full range of motion against gravity with minimal resistance  4-/5      Completes full range of motion against gravity with minimal-moderate resistance  4/5       Completes full range of motion against gravity with moderate resistance  4+/5     Completes full range of motion against gravity with moderate-maximum resistance  5/5       Completes full range of motion against gravity with maximum resistance                 AROM: Grossly decreased on L 2/2 pain and weakness      FIM SCORES Initial Assessment   Bed/Chair/Wheelchair Transfers 3   Wheelchair Mobility 2   Walking [place] 0   Steps/Stairs 0   PRIMARY MODE OF LOCOMOTION: W/C w/ SPV  Please see [facility] Interdisciplinary Eval: Coordination/Balance Section for details regarding FIM score description. BED/CHAIR/WHEELCHAIR TRANSFERS Initial Assessment   Rolling Right 2 (Maximal assistance)   Rolling Left 1 (Total assistance) (unable to achieve full)   Supine to Sit 1 (Total assistance) (requires trunk and LE assist with HOB elevate >30 deg)   Sit to Stand Moderate assistance   Sit to Supine 2 (Maximal assistance) (Trunk and B LE assistance)   Transfer Assist Score Transfer Type:  Other  Other: Patient performs stand step transfer requiring mod a x1 to assist with upright posture, B UE support, weight shift assistance, assisted pivot, controlled lowering, and tactile cueing to improve body position. Patient un-receptive to verbal cueing or assistance to improve form/technique. Patient demonstrates standing squat form with combination pivot and lowering to target surface, consistently reaching for external support to pull-to-stand with STS transition and stepping during pivot/step phase of transfer. Patient non-receptive to verbal cues to prevent reaching. patient with continued lack of receptiveness to education t/o final 3 transfers in session. Transfer Assistance : 3 (Moderate assistance )  Sit to Stand Assistance: Moderate assistance  Car Transfers: Not tested  Car Type: N/A   Transfer Type Other   Comments Patient performs stand step transfer requiring mod a x1 to assist with upright posture, B UE support, weight shift assistance, assisted pivot, controlled lowering, and tactile cueing to improve body position. Patient un-receptive to verbal cueing or assistance to improve form/technique. Patient demonstrates standing squat form with combination pivot and lowering to target surface, consistently reaching for external support to pull-to-stand with STS transition and stepping during pivot/step phase of transfer. Patient non-receptive to verbal cues to prevent reaching. patient with continued lack of receptiveness to education t/o final 3 transfers in session.    Car Transfer Not tested   Car Type N/A          WHEELCHAIR MOBILITY/MANAGEMENT Initial Assessment   Able to Propel 120 feet   Functional Level 2 (SPV w/ B UE technique and LE for steering assist)   Curbs/ramps assistance required 0 (Not tested)   Wheelchair set up assistance required 1 (Dependent/total assistance)   Wheelchair management Manages left brake, Manages right brake          WALKING INDEPENDENCE Initial Assessment   Assistive device  (N/A)   Ambulation assistance - level surface  (Pt unable)   Distance 0 Feet (ft)   Functional Level 0   Comments Pt unable   Ambulation assistance - unlevel surface Pt unable at this time          STEPS/STAIRS Initial Assessment   Steps/Stairs ambulated Steps/Stairs Ambulated (#): 0  Level of Assist : 0 (Not tested)  Rail Use:  (N/A)   Rail Use  (N/A)   Functional Level 0   Comments Pt unable   Curbs/Ramps unable              PHYSICAL THERAPY PLAN OF CARE     Therapy Diagnosis:   Please see table above     Order received from MD for physical therapy services and chart reviewed. Pt to be seen 5 times per week for 3 hours of total therapy per day for 3 weeks. Thank you for the referral.     Physical Therapy Short Term Goals  Initiated 2/9/2017 and to be accomplished within 7 day(s) (02/16/2017)  1. Patient will move from supine to sit and sit to supine , scoot up and down and roll side to side in bed with moderate assistance . 2.  Patient will transfer from bed to chair and chair to bed with moderate assistance  using the least restrictive device and safe technique. 3.  Patient will perform sit to stand with minimal assistance/contact guard assist.  4.  Patient will ambulate with maximal assistance for 15 feet with the least restrictive device. Physical Therapy Long Term Goals  Initiated 2/9/2017 and to be accomplished within 21 day(s) (03/02/2017)  1. Patient will move from supine to sit and sit to supine , scoot up and down and roll side to side in bed with minimal assistance/contact guard assist.    2.  Patient will transfer from bed to chair and chair to bed with minimal assistance/contact guard assist using the least restrictive device. 3.  Patient will perform sit to stand with supervision/set-up. 4.  Patient will ambulate with moderate assistance  for 50 feet with the least restrictive device. 5.  Patient will ascend/descend 4 stairs with 2 handrail(s) with moderate assistance .      Pt would benefit from skilled physical therapy in order to improve independent functional mobility within the home. Interventions may include range of motion (AROM, PROM B LE/trunk), motor function (B LE/trunk strengthening/coordination), activity tolerance (vitals, oxygen saturation levels), bed mobility training, balance activities, gait training (progressive ambulation program), and functional transfer training. Please see [facility]; Interdisciplinary Eval, Care Plan, and Patient Education for further information regarding physical therapy examination and plan of care. Ole Riggs, PT DPT  2/9/2017

## 2017-02-09 NOTE — REHAB NOTE
SHIFT CHANGE NOTE FOR [facility]    Bedside and Verbal shift change report given to  Jerson Caba RN oncoming nurse by Alvarez Hernández LPN off going nurse. Report included the following information SBAR, Kardex, MAR and Recent Results.     Situation:   Code Status: Full Code   Reason for Admission: [address] [facility] [address] Day: 1   Problem List:   Hospital Problems  Date Reviewed: 2/6/2017          Codes Class Noted POA    Chronic pain syndrome (Chronic) ICD-10-CM: G89.4  ICD-9-CM: 338.4  Unknown Yes        Acute blood loss as cause of postoperative anemia ICD-10-CM: D62  ICD-9-CM: 285.1  2/7/2017 Yes        Primary osteoarthritis of left hip (Chronic) ICD-10-CM: M16.12  ICD-9-CM: 715.15  Unknown Yes        Type 2 diabetes mellitus without complication (HCC) (Chronic) ICD-10-CM: E11.9  ICD-9-CM: 250.00  Unknown Yes        Essential hypertension (Chronic) ICD-10-CM: I10  ICD-9-CM: 401.9  Unknown Yes        Decreased calculated glomerular filtration rate (GFR) ICD-10-CM: R94.4  ICD-9-CM: 794.4  2/6/2017 Yes    Overview Signed 2/8/2017  5:12 PM by Mili Zavala MD     Calculated GFR is equivalent to that of CKD stage 2 = 60-89 ml/min             * (Principal)Status post total replacement of left hip ICD-10-CM: Q78.860  ICD-9-CM: V43.64  2/6/2017 Yes    Overview Signed 2/8/2017  5:13 PM by MD Dr. Jamel Driscoll             Aftercare following left hip joint replacement surgery ICD-10-CM: Z47.1, Z96.642  ICD-9-CM: V54.81, V43.64  2/6/2017 Yes    Overview Signed 2/8/2017  5:13 PM by Mili Zavala MD     S/P Left total hip replacement (2/6/2017 - Dr. Jamel Herrera)                   Background:   Past Medical History:   Past Medical History   Diagnosis Date    Acute blood loss as cause of postoperative anemia 2/7/2017    Allergic rhinitis     Anxiety disorder     Bronchial asthma     Cataract of left eye     Chronic alcoholism (HCC)     Chronic pain syndrome     Decreased calculated glomerular filtration rate (GFR) 2/6/2017     Calculated GFR is equivalent to that of CKD stage 2 = 60-89 ml/min    Depression     Dyslipidemia     Essential hypertension     Gout     Incisional hernia     Lichen simplex chronicus     Obesity, Class III, BMI 40-49.9 (morbid obesity) ([facility] [address]     Paronychia     Primary osteoarthritis of left hip     Tobacco abuse     Type 2 diabetes mellitus without complication (HCC)       Patient taking anticoagulants yeS   Patient has a defibrillator: NO    Assessment:   Changes in Assessment throughout shift: NO     Patient has central line: NO  Insertion date:NA Last dressing date:NA   Patient has Villatoro Cath: NO  Insertion date:NA     Last Vitals:     Vitals:    02/08/17 2100 02/09/17 0755   BP: 125/81 140/70   Pulse: 93 85   Resp: 18 18   Temp: 99.3 °F (37.4 °C) 97.4 °F (36.3 °C)   SpO2: 98% 94%        PAIN    Pain Assessment    Pain Intensity 1: 0 (02/09/17 0400) Pain Intensity 1: 2 (12/29/14 1105)    Pain Location 1: Hip Pain Location 1: Abdomen    Pain Intervention(s) 1: Medication (see MAR) Pain Intervention(s) 1: Medication (see MAR)  Patient Stated Pain Goal: 0 Patient Stated Pain Goal: 0  o Intervention effective: YES  o Other actions taken for pain: NONE     Skin Assessment  Skin color Skin Color: Appropriate for ethnicity  Condition/Temperature Skin Condition/Temp: Dry, Warm  Integrity Skin Integrity: Incision (comment)  Turgor Turgor: Non-tenting  Weekly Pressure Ulcer Documentation Weekly Pressure Ulcer Documentation: No Pressure Ulcer Noted-Pressure Ulcer Prevention Initiated  Wound Prevention & Protection Methods  Orientation of wound Orientation of Wound Prevention: Posterior  Location of Prevention Location of Wound Prevention: Sacrum/Coccyx  Dressing Present Dressing Present : No  Dressing Status    Wound Offloading Wound Offloading (Prevention Methods): Bed, pressure redistribution/air     INTAKE/OUPUT    Date 02/08/17 0700 - 02/09/17 0659 02/09/17 0700 - 02/10/17 0659   Shift 0700-1859 7149-7621 24 Hour Total 6715-0362 7493-7625 24 Hour Total   I  N  T  A  K  E   Shift Total         O  U  T  P  U  T   Urine            Urine Occurrence(s)  1 x 1 x 0 x  0 x    Stool            Stool Occurrence(s)  0 x 0 x 0 x  0 x    Shift Total         NET         Weight (kg)             Recommendations:  1. Patient needs and requests: NONE    2. Diet: DIABETIC    3. Pending tests/procedures: NONE    4. Functional Level/Equipment: WHEELCHAIR    5. Estimated Discharge Date: TO BE DETERMINE Posted on Whiteboard in Patients Room: [facility] Safety Check    A safety check occurred in the patient's room between off going nurse and oncoming nurse listed above. The safety check included the below items  Area Items   H  High Alert Medications - Verify all high alert medication drips (heparin, PCA, etc.)   E  Equipment - Suction is set up for ALL patients (with fannie)  - Red plugs utilized for all equipment (IV pumps, etc.)  - WOWs wiped down at end of shift.  - Room stocked with oxygen, suction, and other unit-specific supplies   A  Alarms - Bed alarm is set for fall risk patients  - Ensure chair alarm is in place and activated if patient is up in a chair   L  Lines - Check IV for any infiltration  - Villatoro bag is empty if patient has a Villatoro   - Tubing and IV bags are labeled   S  Safety   - Room is clean, patient is clean, and equipment is clean. - Hallways are clear from equipment besides carts. - Fall bracelet on for fall risk patients  - Ensure room is clear and free of clutter  - Suction is set up for ALL patients (with fannie)  - Hallways are clear from equipment besides carts.    - Isolation precautions followed, supplies available outside room, sign posted

## 2017-02-09 NOTE — INTERDISCIPLINARY ROUNDS
[facility] PHYSICAL REHABILITATION  [address] [place] [address], [address]    INPATIENT REHABILITATION  TEAM CONFERENCE SUMMARY     Date of Conference: 2/10/17    Name: Rashel Parra Age / Sex: 64 y.o. / female   CSN: [id number] MRN: [id number]   [facility] Date: 2/8/2017 Length of Stay: 1 days     Primary Rehab Diagnosis: Impaired Mobility and ADLs secondary to Status post total replacement of left hip      Therapy:     FIM SCORES Initial Assessment Weekly Progress Assessment 2/9/2017   Eating Functional Level: 5  Comments: Pt requires set-up  for opening some containers 2/2 decreased hand strength. Pt has partial dental fixtures. Swallowing     Grooming 5     Bathing 1 (Pt stood in shower at home.)        Upper Body Dressing Functional Level: 5  Items Applied/Steps Completed: Pullover (4 steps)  Comments: Set-up to provide pt clothing. Pt able to nany/doff pullover shirt while seated. Lower Body Dressing Functional Level: 2  Items Applied/Steps Completed: Sock, left (1 step), Sock, right (1 step), Elastic waist pants (3 steps)  Comments: Pt required assistance to thread pants over LEs and socks over feet. pt stood with RW/min A to nany pants over hips with assistance for back of pants. Toileting Functional Level: 2  Comments: Pt is able to manage hygiene while seated and stood for clothing management with min A/RW. OT assisted with managing pants over pt's buttocks. Bladder  level of assist 3 2   Bladder  accident frequency score 6     Bowel  level of assist 2 3   Bowel  accident frequency score       Toilet Transfer Bishop Toilet Transfer Score: 0  Comments: Pt required min A for stand step transfer from w/c to/from toilet with RW. Pt required mod A for sit to stand from toilet. OT will place [facility] over toilet for increased independence with sit to stand transfers from toilet.      Tub/Shower Transfer Bishop Tub or Shower Type: Tub/Shower combination  Tub/Shower Transfer Score: 0  Comments: NT 2/2 pt bathed at sink this session.  Tub/Shower combination      Comprehension Primary Mode of Comprehension: Auditory  Score: 7 Auditory  6   Expression Primary Mode of Expression: Verbal  Score: 7 Verbal  6   Social Interaction Score: 7 6   Problem Solving Score: 7 5   Memory Score: 7 5     FIM SCORES Initial Assessment Weekly Progress Assessment 2/9/2017   Bed/Chair/Wheelchair Transfers       Bed Mobility Rolling Right     Rolling Left     Supine to Sit     Sit to Stand     Sit to Supine      Rolling Right       Rolling Left       Supine to Sit       Sit to Stand       Sit to Supine          Locomotion (W/C)   Function    Setup Assistance         Locomotion (W/C distance) 120 Feet (x 2 trials B UE technique w/ B LE for steering assistance)     Locomotion (Walk)       (N/A)   Locomotion (Walk dist.) 0 Feet (ft) 0 Feet (ft)   Steps/Stairs             Nursing:     Neuro:   A&O x__4__                   Respiratory:   [x] WNL   [] O2   [] LPM ______   Other:    Peripheral Vascular:   [] TEDS present   [] Edema present ____ Grade  Cardiac:   [x] WNL   [] Other  Genitourinary:   [x] continent   [] incontinent   [] hampton  Abdominal _______ LBM  GI: __diabetic_____ Diet ____thin__ Liquids _____ tube feeds  Musculoskeletal: ____ ROM Transfers ___wheelchair__ Assistive Device Used  __1_ Level of Assistance  Skin Integumentary:   [x] Intact   [] Not Intact   __________Preventative Measures  Details______incision to left hip with staples_dry drsg_______________________________________________________  Pain: [x] Controlled   [] Not Controlled   Pain Meds:   [x] Scheduled   [x] PRN        Registered Dietitian / Nutrition:     Patient Vitals for the past 100 hrs:   % Diet Eaten   02/09/17 0942 100 %               Supplements:          [] Yes   [] No      Amount of supplement consumed:        Intake/Output Summary (Last 24 hours) at 02/09/17 1222  Last data filed at 02/09/17 0942   Gross per 24 hour   Intake 180 ml   Output                0 ml   Net              180 ml                              Last bowel movement:         Interdisciplinary Team Goals:     1. Goal      Barrier      Intervention       2. Goal      Barrier      Intervention       3. Goal      Barrier      Intervention       4. Goal  Nursing: By discharge , patient pain levels will be < 5 with  Activity and less than 3 at rest    Barrier  left hip replacement surgery    Intervention  pain medication and rest     5. Goal  Maintain mood/thought stability    Barrier  History of mood with thought disturbance    Intervention  Rx and support      6. Goal      Barrier      Intervention         Disposition / Discharge Planning: Follow-up therapy services:  tbd   DME recommendations:  tbd   Estimated discharge date:  tbd   Discharge Location:  home         Electronic Signatures:      Signature Date Signed   Physical Therapist         Occupational Therapist         Speech Therapist         Recreational Therapist         Nursing    Markos Rangel LPN  9/2/14   Dietitian         Clinical Psychologist    Jaxson Perea, Ph.D. 2/9/17    Physician             DIOR Stewart  2/9/17         The above information has been reviewed with the patient in a language that they can understand. Opportunity for comments and questions has been provided and a signed attestation has been scanned into the \"media tab\" of the EMR.       Patient Signature: ______________________________________________________    Date Signed: __________________________________________________________

## 2017-02-09 NOTE — H&P
[facility] PHYSICAL REHABILITATION  [address] [place] [address], [address]     INPATIENT REHABILITATION  HISTORY AND PHYSICAL  ([address] [place] [address] Admission Physician Evaluation)    Name: Ene Hess CSN: [id number]   Age: 64 y.o. MRN: [id number]   Sex: female Admit Date: 2/8/2017     PCP: Dr. Deandre Osborn      Primary Rehab Impairment Category (MARGARITA): Replacement of LE joint    Impairment Group Label: Status post unilateral hip replacement    Etiologic Diagnosis: Primary osteoarthritis of the left hip      Subjective:     Patient seen and examined. History of the Present Illness: The patient is a 26-year-old obese, Black female with multiple medical comorbidities who was admitted to [facility] on 2/06/2017 for an elective left total hip replacement. The patient has had chronic left hip pain for months and she had failed conservative measures so surgery was recommended. The patient was admitted under the service of Orthopedics (Dr. Idelia Castleman) and the patient underwent a Left total hip replacement on 2/6/2017 done by Dr. Idelia Castleman. The patient tolerated the procedure well with no intraoperative complications. Enoxaparin was given for DVT prophylaxis. Oral narcotics were given for analgesia. The patient developed acute postoperative blood loss anemia but no blood transfusion was given. [facility]. Hospitalist Team (Dr. Demetrio Zepeda) was called for management of the patient's medical issues. Nebulizations were given for the bronchial asthma. The patient had remained hemodynamically stable but due to the above events, the patient was noted to be generally weak and with impaired mobility and ADLs. Patient was felt to be a good candidate for acute inpatient rehabilitation. Upon evaluation by Physical Therapy and Occupational Therapy, the patient was recommended for acute inpatient rehabilitation.  The patient was discharged and was subsequently admitted to the [facility] for [facility] for intensive rehabilitation to help recover strength, function and mobility. Past Medical History:  Past Medical History   Diagnosis Date    Acute blood loss as cause of postoperative anemia 2017    Allergic rhinitis     Anxiety disorder     Bronchial asthma     Cataract of left eye     Chronic alcoholism (HCC)     Chronic pain syndrome     Decreased calculated glomerular filtration rate (GFR) 2017     Calculated GFR is equivalent to that of CKD stage 2 = 60-89 ml/min    Depression     Dyslipidemia     Essential hypertension     Gout     History of tobacco abuse     Incisional hernia     Lichen simplex chronicus     Obesity, Class III, BMI 40-49.9 (morbid obesity) ([facility] [address]     Paronychia     Primary osteoarthritis of left hip     Type 2 diabetes mellitus without complication (HCC)        Past Surgical History:  Past Surgical History   Procedure Laterality Date    Hx hernia repair      Hx hysterectomy      Hx wrist fracture tx Right     Hx hip replacement Left 2017     S/P Left total hip replacement (2017 - Dr. Ceja Husbands)    Hx wrist fracture tx Left        Allergies:  No Known Allergies      Social History: The patient is single, lives with her 59-year old male cousin in a 1-story house with a 3-step entry in [place], [place]. She used to smoke cigarettes but quit last 2016. She drinks 2-3 drinks of vodka 1-2 times a month. She last used marijuana ~8 months ago. Family History: Both parents are . No family history on file. Home Medications (medications taken at home prior to admission to [address] [place] [address]):      Details   perphenazine (TRILAFON) 2 mg tablet Take 2 mg by mouth nightly. oxyCODONE IR (OXY-IR) 15 mg immediate release tablet Take 15 mg by mouth three (3) times daily as needed for Pain.  Indications: Pain      fentaNYL (DURAGESIC) 50 mcg/hr PATCH 1 Patch by TransDERmal route every -two (72) hours. Indications: Chronic Pain with Opioid Tolerance      ibuprofen (MOTRIN) 800 mg tablet Take 800 mg by mouth three (3) times daily as needed for Pain. Indications: OSTEOARTHRITIS      albuterol (VENTOLIN HFA) 90 mcg/actuation inhaler Take 2 Puffs by inhalation every four (4) hours as needed for Wheezing or Shortness of Breath. Indications: Acute Asthma Attack      diazepam (VALIUM) 10 mg tablet Take 10 mg by mouth three (3) times daily as needed for Anxiety. Indications: ANXIETY      amLODIPine (NORVASC) 5 mg tablet Take 5 mg by mouth daily. Indications: hypertension      ranitidine (ZANTAC) 150 mg tablet Take 150 mg by mouth two (2) times a day. Indications: PREVENTION OF STRESS ULCER      cetirizine (ZYRTEC) 10 mg tablet Take 10 mg by mouth daily. Indications: ALLERGIC RHINITIS      metFORMIN (GLUCOPHAGE) 1,000 mg tablet Take 1,000 mg by mouth two (2) times daily (with meals). Indications: type 2 diabetes mellitus      busPIRone (BUSPAR) 30 mg tablet Take 30 mg by mouth four (4) times daily. Indications: GENERALIZED ANXIETY DISORDER      simvastatin (ZOCOR) 20 mg tablet Take 20 mg by mouth nightly. Indications: DYSLIPIDEMIA      theophylline ER,12 hour, (THEOCHRON) 300 mg tablet Take 300 mg by mouth two (2) times a day. Indications: BRONCHIAL ASTHMA      sertraline (ZOLOFT) 100 mg tablet Take 100 mg by mouth two (2) times a day. Indications: ANXIETY WITH DEPRESSION      allopurinol (ZYLOPRIM) 300 mg tablet Take 300 mg by mouth daily. Indications: GOUT      diphenhydrAMINE (BENADRYL) 25 mg capsule Take 25 mg by mouth every six (6) hours as needed for Skin Irritation. Indications: PRURITUS OF SKIN      nitroglycerin (NITROSTAT) 0.4 mg SL tablet 0.4 mg by SubLINGual route every five (5) minutes as needed for Chest Pain.      multivitamin, tx-iron-ca-min (THERA-M W/ IRON) 9 mg iron-400 mcg tab tablet Take 1 Tab by mouth daily.  Indications: VITAMIN DEFICIENCY PREVENTION             Transfer Medications (from the transfer summary prepared by ASIA Daniel):    Current Discharge Medication List    Details   oxyCODONE-acetaminophen (PERCOCET) 5-325 mg per tablet Take 1-2 Tabs by mouth every four (4) hours as needed. Max Daily Amount: 12 Tabs. Qty: 60 Tab, Refills: 0      enoxaparin (LOVENOX) 40 mg/0.4 mL 0.4 mL by SubCUTAneous route daily for 21 days. Qty: 21 Syringe, Refills: 0      albuterol (VENTOLIN HFA) 90 mcg/actuation inhaler Take  by inhalation. diazepam (VALIUM) 10 mg tablet Take 10 mg by mouth every six (6) hours as needed for Anxiety. oxyCODONE-acetaminophen (PERCOCET 10)  mg per tablet Take 1 Tab by mouth every eight (8) hours as needed for Pain. amLODIPine (NORVASC) 5 mg tablet Take 5 mg by mouth daily. ranitidine (ZANTAC) 150 mg tablet Take 150 mg by mouth two (2) times a day. cetirizine (ZYRTEC) 10 mg tablet Take  by mouth.      metFORMIN (GLUCOPHAGE) 1,000 mg tablet Take 1,000 mg by mouth two (2) times daily (with meals). busPIRone (BUSPAR) 30 mg tablet Take 30 mg by mouth daily. simvastatin (ZOCOR) 20 mg tablet Take 20 mg by mouth nightly. theophylline ER,12 hour, (THEOCHRON) 300 mg tablet Take  by mouth two (2) times a day. perphenazine (TRILAFON) 4 mg tablet Take 4 mg by mouth two (2) times a day. diphenhydrAMINE (BENADRYL) 25 mg capsule Take 25 mg by mouth every six (6) hours as needed. sertraline (ZOLOFT) 100 mg tablet Take 100 mg by mouth daily. nitroglycerin (NITROSTAT) 0.4 mg SL tablet 0.4 mg by SubLINGual route every five (5) minutes as needed for Chest Pain. fluticasone-salmeterol (ADVAIR) 100-50 mcg/dose diskus inhaler Take 1 Puff by inhalation every twelve (12) hours. allopurinol (ZYLOPRIM) 300 mg tablet Take 100 mg by mouth daily. multivitamin, tx-iron-ca-min (THERA-M W/ IRON) 9 mg iron-400 mcg tab tablet Take 1 Tab by mouth daily. Review Of Systems:   CONSTITUTIONAL: No weight loss.    EYES: No blurred vision and no eye discharge. ENT: No nasal discharge. No ear pain. CARDIOVASCULAR: No chest pain and no diaphoresis. RESPIRATORY: No cough, no hemoptysis. GI: No vomiting, no diarrhea   : No urinary frequency and no dysuria. MUSCULOSKELETAL: Significant for acute postoperative musculoskeletal pain. SKIN: No rashes. NEURO: No dizziness, no numbness. ENDOCRINE: No polyphagia and no polydipsia. HEMATOLOGY: Significant for acute postoperative blood loss anemia. Objective:     Vital Signs:  Patient Vitals for the past 24 hrs:   BP Temp Pulse Resp SpO2 Height Weight   02/09/17 1118 - - - - - 5' 3\" (1.6 m) 105.7 kg (233 lb)   02/09/17 0755 140/70 97.4 °F (36.3 °C) 85 18 94 % - -   02/08/17 2100 125/81 99.3 °F (37.4 °C) 93 18 98 % - -        Body mass index is 41.27 kg/(m^2). Physical Exam:  GENERAL SURVEY: Patient is awake to drowsy, oriented x 3, sitting comfortably on the chair, not in acute respiratory distress. HEENT: pale palpebral conjunctivae, anicteric sclerae, no nasoaural discharge, moist oral mucosa  NECK: supple, no jugular venous distention, no palpable lymph nodes  CHEST/LUNGS: symmetrical chest expansion, good air entry, clear breath sounds  HEART: adynamic precordium, good S1 S2, no S3, regular rhythm, no murmurs  ABDOMEN: obese, bowel sounds appreciated, soft, non-tender  EXTREMITIES: pale nailbeds, no edema, full and equal pulses, no calf tenderness   NEUROLOGICAL EXAM: The patient is awake to drowsy and oriented x3, able to answer questions fairly appropriately, able to follow 1 and 2 step commands. Able to tell time from the wall clock. Cranial nerves II-XII are grossly intact. No gross sensory deficit. Motor strength is 4-/5 on BUE, 4-/5 on the RLE, 1/5 on the left hip, 3/5 on the left knee, 3+/5 on the left ankle.     Incision(s): covered, clean, dry, and intact      Current Medications:  Current Facility-Administered Medications   Medication Dose Route Frequency  acetaminophen (TYLENOL) tablet 650 mg  650 mg Oral Q4H PRN    docusate sodium (COLACE) capsule 100 mg  100 mg Oral BID    bisacodyl (DULCOLAX) tablet 10 mg  10 mg Oral Q48H PRN    enoxaparin (LOVENOX) injection 40 mg  40 mg SubCUTAneous Q24H    insulin lispro (HUMALOG) injection   SubCUTAneous TIDAC    albuterol (PROVENTIL VENTOLIN) nebulizer solution 2.5 mg  2.5 mg Nebulization Q4H PRN    allopurinol (ZYLOPRIM) tablet 300 mg  300 mg Oral DAILY    amLODIPine (NORVASC) tablet 5 mg  5 mg Oral DAILY    celecoxib (CELEBREX) capsule 100 mg  100 mg Oral BID WITH MEALS    loratadine (CLARITIN) tablet 10 mg  10 mg Oral DAILY    perphenazine (TRILAFON) tablet tab 4 mg  4 mg Oral BID    famotidine (PEPCID) tablet 20 mg  20 mg Oral BID    sertraline (ZOLOFT) tablet 100 mg  100 mg Oral DAILY    simvastatin (ZOCOR) tablet 20 mg  20 mg Oral QHS    theophylline ER(12 hour) (THEOCHRON) tablet 300 mg  300 mg Oral Q12H    oxyCODONE-acetaminophen (PERCOCET 7.5) 7.5-325 mg per tablet 1-2 Tab  1-2 Tab Oral Q4H PRN    glucose chewable tablet 16 g  4 Tab Oral PRN    glucagon (GLUCAGEN) injection 1 mg  1 mg IntraMUSCular PRN    dextrose (D50W) injection syrg 12.5-25 g  25-50 mL IntraVENous PRN    fentaNYL (DURAGESIC) 50 mcg/hr patch 1 Patch  1 Patch TransDERmal Q72H    therapeutic multivitamin (THERAGRAN) tablet 1 Tab  1 Tab Oral DAILY    nitroglycerin (NITROSTAT) tablet 0.4 mg  0.4 mg SubLINGual PRN    diphenhydrAMINE (BENADRYL) capsule 25 mg  25 mg Oral Q6H PRN    metFORMIN (GLUCOPHAGE) tablet 500 mg  500 mg Oral BID WITH MEALS    arformoterol (BROVANA) neb solution 15 mcg  15 mcg Nebulization BID RT    budesonide (PULMICORT) 500 mcg/2 ml nebulizer suspension  500 mcg Nebulization BID RT       Functional Deficits:     Occupational Therapy   Prior Level of Function  Pre-Admission Screen  Post-Admission Evaluation   Eating   Independent Eating   Supervision Eating  Functional Level: 5   Grooming   Independent Grooming   Supervision Grooming  Functional Level: 5   Upper Body Dressing   Independent Upper Body Dressing   Minimal Assist Upper Body Dressing  Functional Level: 5   Lower Body Dressing   Modified Independent Lower Body Dressing   Maximal Assist Lower Body Dressing  Functional Level: 2   Bladder Management   Independent Bladder Management   Supervision Toileting  Functional Level: 3   Bowel Management   Independent Bowel Management   Supervision      Physical Therapy   Prior Level of Function  Pre-Admission Screen  Post-Admission Evaluation   Ambulation   Moderate Ray Ambulation   Maximal Assist Gait  Distance (ft): 0 Feet (ft)  Assistive Device:  (N/A)   Bed Mobility   Independent Bed Mobility   Moderate Assist Bed/Mat Mobility  Rolling Right : 2 (Maximal assistance)  Rolling Left : 1 (Total assistance) (unable to achieve full)  Supine to Sit : 1 (Total assistance) (requires trunk and LE assist with HOB elevate >30 deg)  Sit to Supine : 2 (Maximal assistance) (Trunk and B LE assistance)   Supine to Sit   Independent Supine to Sit   Maximal Assist Bed/Mat Mobility  Rolling Right : 2 (Maximal assistance)  Rolling Left : 1 (Total assistance) (unable to achieve full)  Supine to Sit : 1 (Total assistance) (requires trunk and LE assist with HOB elevate >30 deg)  Sit to Supine : 2 (Maximal assistance) (Trunk and B LE assistance)   Sit to Stand   Independent Sit to Stand   Moderate Assist Bed/Mat Mobility  Rolling Right : 2 (Maximal assistance)  Rolling Left : 1 (Total assistance) (unable to achieve full)  Supine to Sit : 1 (Total assistance) (requires trunk and LE assist with HOB elevate >30 deg)  Sit to Supine : 2 (Maximal assistance) (Trunk and B LE assistance)   Bed/Chair Transfers   Modified Independent Bed/Chair Transfers   Moderate Assist Transfers  Transfer Type:  Other  Other: Patient performs stand step transfer requiring mod a x1 to assist with upright posture, B UE support, weight shift assistance, assisted pivot, controlled lowering, and tactile cueing to improve body position. Patient un-receptive to verbal cueing or assistance to improve form/technique. Patient demosntrates standing squat form with combination pivot and lowering to target surface, consistently reaching for external support to pull-to-stand with STS transition and stepping during pivot/step phase of transfer. Patient nont-reeptive to verbal cues to prevent reaching. patient with continued lack of recptiveness to education t/o final 3 transfers in session. Transfer Assistance : 3 (Moderate assistance )  Sit to Stand Assistance:  Moderate assistance  Car Transfers: Not tested  Car Type: N/A   Toilet Transfers   Modified Independent Toilet Transfers   Moderate Assist Toilet Transfers  Toilet Transfer Score: 3     Speech and Language Pathology  Post-Admission Evaluation     Comprehension (Native Language)  Primary Mode of Comprehension: Auditory  Score: 6     Expression (Native Language)  Primary Mode of Expression: Verbal  Score: 6     Social Interaction/Pragmatics  Score: 6     Problem Solving  Score: 5     Memory  Score: 5       Legend:   7 - Independent   6 - Modified Independent   5 - Standby Assistance / Supervision / Set-up   4 - Minimum Assistance / Contact Guard Assistance   3 - Moderate Assistance   2 - Maximum Assistance   1 - Total Assistance / Dependent       Labs on Admission:  Recent Results (from the past 24 hour(s))   GLUCOSE, POC    Collection Time: 02/08/17  5:07 PM   Result Value Ref Range    Glucose (POC) 173 (H) 70 - 110 mg/dL   GLUCOSE, POC    Collection Time: 02/08/17  9:16 PM   Result Value Ref Range    Glucose (POC) 186 (H) 70 - 110 mg/dL   URINALYSIS W/MICROSCOPIC    Collection Time: 02/09/17  4:15 AM   Result Value Ref Range    Color YELLOW      Appearance CLOUDY      Specific gravity 1.015 1.005 - 1.030      pH (UA) 5.5 5.0 - 8.0      Protein NEGATIVE  NEG mg/dL    Glucose NEGATIVE  NEG mg/dL    Ketone NEGATIVE  NEG mg/dL    Bilirubin NEGATIVE  NEG      Blood NEGATIVE  NEG      Urobilinogen 0.2 0.2 - 1.0 EU/dL    Nitrites NEGATIVE  NEG      Leukocyte Esterase NEGATIVE  NEG      WBC 0 to 3 0 - 4 /hpf    RBC NONE 0 - 5 /hpf    Epithelial cells 2+ 0 - 5 /lpf    Bacteria NEGATIVE  NEG /hpf   CBC WITH AUTOMATED DIFF    Collection Time: 02/09/17  6:15 AM   Result Value Ref Range    WBC 16.1 (H) 4.6 - 13.2 K/uL    RBC 3.21 (L) 4.20 - 5.30 M/uL    HGB 9.9 (L) 12.0 - 16.0 g/dL    HCT 29.6 (L) 35.0 - 45.0 %    MCV 92.2 74.0 - 97.0 FL    MCH 30.8 24.0 - 34.0 PG    MCHC 33.4 31.0 - 37.0 g/dL    RDW 14.4 11.6 - 14.5 %    PLATELET 208 438 - 498 K/uL    MPV 11.8 9.2 - 11.8 FL    NEUTROPHILS 80 (H) 40 - 73 %    LYMPHOCYTES 9 (L) 21 - 52 %    MONOCYTES 7 3 - 10 %    EOSINOPHILS 4 0 - 5 %    BASOPHILS 0 0 - 2 %    ABS. NEUTROPHILS 13.0 (H) 1.8 - 8.0 K/UL    ABS. LYMPHOCYTES 1.4 0.9 - 3.6 K/UL    ABS. MONOCYTES 1.1 0.05 - 1.2 K/UL    ABS. EOSINOPHILS 0.6 (H) 0.0 - 0.4 K/UL    ABS.  BASOPHILS 0.0 0.0 - 0.1 K/UL    DF AUTOMATED     FERRITIN    Collection Time: 02/09/17  6:15 AM   Result Value Ref Range    Ferritin 215 8 - 388 NG/ML   IRON PROFILE    Collection Time: 02/09/17  6:15 AM   Result Value Ref Range    Iron 16 (L) 50 - 175 ug/dL    TIBC 210 (L) 250 - 450 ug/dL    Iron % saturation 8 %   MAGNESIUM    Collection Time: 02/09/17  6:15 AM   Result Value Ref Range    Magnesium 2.0 1.8 - 2.4 mg/dL   METABOLIC PANEL, BASIC    Collection Time: 02/09/17  6:15 AM   Result Value Ref Range    Sodium 140 136 - 145 mmol/L    Potassium 3.7 3.5 - 5.5 mmol/L    Chloride 107 100 - 108 mmol/L    CO2 25 21 - 32 mmol/L    Anion gap 8 3.0 - 18 mmol/L    Glucose 138 (H) 74 - 99 mg/dL    BUN 25 (H) 7.0 - 18 MG/DL    Creatinine 0.84 0.6 - 1.3 MG/DL    BUN/Creatinine ratio 30 (H) 12 - 20      GFR est AA >60 >60 ml/min/1.73m2    GFR est non-AA >60 >60 ml/min/1.73m2    Calcium 9.1 8.5 - 10.1 MG/DL   RETICULOCYTE COUNT    Collection Time: 02/09/17  6:15 AM   Result Value Ref Range    Reticulocyte count 1.3 0.5 - 2.3 %   VITAMIN D, 25 HYDROXY    Collection Time: 02/09/17  6:15 AM   Result Value Ref Range    Vitamin D 25-Hydroxy 30.2 30 - 100 ng/mL   URIC ACID    Collection Time: 02/09/17  6:15 AM   Result Value Ref Range    Uric acid 6.1 2.6 - 7.2 MG/DL   THEOPHYLLINE    Collection Time: 02/09/17  6:15 AM   Result Value Ref Range    Theophylline level 21.0 ug/mL   GLUCOSE, POC    Collection Time: 02/09/17  7:55 AM   Result Value Ref Range    Glucose (POC) 152 (H) 70 - 110 mg/dL   GLUCOSE, POC    Collection Time: 02/09/17 11:43 AM   Result Value Ref Range    Glucose (POC) 179 (H) 70 - 110 mg/dL       Creatinine Clearance (Katie Fletcher): On admission, estimated creatinine clearance is 81.8 mL/min (based on Cr of 0.84). Assessment:     Primary Rehab Diagnosis  1. Impaired Mobility and ADLs  2. Primary osteoarthritis of the left hip  3. S/P Left total hip replacement (2/6/2017 - Dr. Rossy Alvarado)    Comorbidities   Bronchial asthma    Type 2 diabetes mellitus without complication    Essential hypertension    Depression    Dyslipidemia    Decreased calculated glomerular filtration rate (GFR)    Aftercare following left hip joint replacement surgery    Acute blood loss as cause of postoperative anemia    Gout    Allergic rhinitis    Cataract of left eye    Incisional hernia    Lichen simplex chronicus    Paronychia    Chronic alcoholism    Tobacco abuse    Chronic pain syndrome    Obesity, Class III, BMI 40-49.9 (morbid obesity)     Anxiety disorder    Encounter for monitoring of theophylline therapy    Prerenal azotemia       Willingness to participate in the program: Good      Rehabilitation Potential: Good      Plan:     1. Medical Issues being followed closely:    [x]  Fall and safety precautions     [x]  Wound Care     [x]  Bowel and Bladder Function     [x]  Fluid Electrolyte and Nutrition Balance     [x]  Pain Control      2.  Issues that 24 hour rehabilitation nursing is following:    [x]  Fall and safety precautions     [x]  Wound Care     [x]  Bowel and Bladder Function     [x]  Fluid Electrolyte and Nutrition Balance     [x]  Pain Control      [x]  Assistance with and education on in-room safety with transfers to and from the bed, wheelchair, toilet and shower. 3. Acute rehabilitation plan of care:    [x]  Patient to be evaluated and treated by:           [x]  Physical Therapy           [x]  Occupational Therapy           []  Speech Therapy     []  Hold Rehab until further notice     5. Medications:    [x]  MAR Reviewed     [x]  Continue Present Medications     6. DVT Prophylaxis:      [x]  Lovenox     []  Arixtra       []  Unfractionated Heparin     []  Coumadin     []  Xarelto     [x]  RUIZ Stockings     []  Sequential Compression Device     []  None     7. Rehabilitation program and expectations from patient, as well as medical issues discussed with the patient. REHABILITATION PLAN:    1. The patient is being admitted to a comprehensive acute inpatient rehabilitation program consisting of at least 180 minutes a day, 5 out of 7 days a week of  combined physical and occupational therapy, and close supervision by a physician with special training and experience in rehabilitation medicine. 2. The patient's prognosis for significant practical improvement within a reasonable period of time appears to be good. 3. The estimated length of stay is 10 days. 4. The patient/family has a good understanding of our discharge process. The patient has potential to make improvement and is in need of at least two of the following multidisciplinary therapies including but not limited to physical, occupational, speech, nutritional services, wound care, prosthetics and orthotics. The patient is expected to be able to return to home with home health therapy and family support.   5. Given the patient's multiple co-morbidities and risk for further medical complications, rehabilitation services could not be safely provided at a lower level of care such as a skilled nursing facility. 6. Physical therapy for therapeutic exercise, progressive mobility, gait training, transfer training, bed mobility training, patient and family education, and wheelchair mobility training. Physical therapy goals to address  extremity function, range of motion, balance, safety awareness, independence in transfers, activity tolerance, independence in bed mobility, and independence in ambulation. 7. Occupational therapy for self-care home management, transfer training, therapeutic exercise, activity, wheelchair mobility training. Occupational therapy goals to address  extremity function, cognition, balance, activity tolerance, independence in functional transfers, range of motion, safety awareness, independence in ADL  and independence in home management skills. 8. Specialized 24 hour rehabilitation nursing care for bowel and bladder retraining, disease management, pain management, pressure ulcer prevention and management per policy, education on pressure relief techniques, embolism prevention, nutrition management, hydration management, transfer training and   medication distribution. 9. Nutrition and Dietary services will be obtained for assessment of adequate calorie needs, hydration and calorie counts as appropriate. 10. Therapeutic recreation for leisure skills. 6. Rehab psychology for coping skills. 12. Social work services for patient and family counseling and safe discharge planning. 13. I will be in charge of the inpatient rehab program. Full details of the inpatient rehab program will be outlined in the initial team conference. REHABILITATION GOALS:  Improve functional and activities of daily living skills in order to return back to independent living with family support.       MEDICAL PLAN:  > Primary osteoarthritis of the left hip; S/P Left total hip replacement (2/6/2017 - Dr. Kwasi Childress)   > Weightbearing as tolerated on the left lower extremity   > Total hip precautions on the left hip    > Staples to be removed on 2/20/2017    > Acute Postoperative Blood Loss Anemia   > Hgb/Hct (2/09/2017, on admission) = 9.9/29.6   > Anemia work-up showed serum iron 16, TIBC 210, iron % saturation 8, ferritin 215, reticulocyte count 1.3   > FeSO4 325 mg PO once daily with breakfast    > Ascorbic Acid 250 mg PO once daily with breakfast (to enhance the absorption of the FeSO4)    > Anxiety / Depression   > Patient was [facility] on:    > Diazepam 10 mg PO q 6 hr PRN for anxiety    > Buspirone 30 mg PO daily    > Perphenazine 4 mg PO BID    > Sertraline 100 mg PO once daily   > Confirmed psychiatric medications from [address] [place] [address] as follows:    > Diazepam 10 mg PO TID PRN    > Buspirone 30 mg PO 4 times daily    > Perphenazine 2 mg PO q HS    > Sertraline 100 mg PO BID   > Hold Diazepam 10 mg PO TID PRN   > Change Buspirone from 30 mg PO daily to 20 mg PO TID   > Decrease Perphenazine from 4 mg PO BID to 2 mg PO q HS   > Sertraline 100 mg PO once daily   > Will start patient on:    > Modafinil 100 mg PO q 8AM    > Melatonin 3 mg PO q HS     > Bronchial asthma   > Arformoterol nebulization BID until tonight   > Budesonide nebulization BID until tonight   > Start tomorrow: Symbicort 160-4.5 2 puffs inhaled BID   > Albuterol nebulization q 4 hr PRN for shortness of breath   > Theophylline level (2/09/2017) = 21.0    > Hold Theophylline  mg PO q 12 hr    > On Monday (2/13/2017), resume Theophylline ER at a decreased dose of 200 mg PO q 12 hr    > Essential hypertension   > Amlodipine 5 mg PO once daily (9AM)    > Type 2 diabetes mellitus   > Metformin 500 mg PO BID with meals   > Humalog insulin sliding scale SC TID AC only    > Prerenal azotemia    > BUN/Creatinine ratio (2/09/2017) = 30 (BUN 25, Creatinine 0.84)    > Increase oral fluid intake    > Chronic pain syndrome   > Acetaminophen 650 mg PO q 4 hr PRN for pain level less than 5/10   > Celecoxib 100 mg PO BID PC   > Fentanyl 50 mcg/hr patch, 1 patch on skin q 72 hr    > Percocet 7.5/325 1-2 tabs PO q 4 hr PRN for breakthrough pain level greater than 4/10 (from 8PM to 4AM only)      > Vitamin D 25-Hydroxy (2/9/2017) = 30.2   > Cholecalciferol 2,000 units PO once daily       PRECAUTIONS:   1. Safety/fall precautions. 2. Deep venous thrombosis precautions. 3. Weightbearing as tolerated on the left lower extremity. 4. Total hip precautions on the left hip. POTENTIAL BARRIERS TO DISCHARGE: Risk for falls. RELEVANT CHANGES SINCE PREADMISSION SCREENING: I have compared the patients medical and functional status at the time of the pre-admission screening and on this post-admission evaluation. The preadmission screen and findings from therapy evaluations both support my post admission physician evaluation, deeming this patient to be an appropriate candidate for the IRF. The patient requires multidisciplinary treatment, physician oversight and intensive therapy not provided at a lower level of care. By signing this document, I acknowledge that I have personally performed a full physical examination on this patient within 24 hours of admission to this inpatient rehabilitation facility and have determined the patient to be able to tolerate the above course of treatment at an intensive level for a reasonable period of time. I will be completing a detailed individualized plan of care for this patient by day #4 of the patients stay based upon the Pre-Admission Screen, the Post-Admission Evaluation, and the therapy evaluations.       Signed:    Arina Josue MD    February 9, 2017

## 2017-02-09 NOTE — PROGRESS NOTES
Problem: Self Care Deficits Care Plan (Adult)  Goal: *Therapy Goal (Edit Goal, Insert Text)  Long Term Goals (to be met upon discharge date) in order to increase pts functional independence and safety, and decrease burden of care:  1. Pt will perform self-feeding with modified independence. 2. Pt will perform grooming with modified independence. 3. Pt will perform UB bathing with modified independence. 4. Pt will perform LB bathing with modified independence. 5. Pt will perform shower transfer with supervision. 6. Pt will perform UB dressing with independence. 7. Pt will perform LB dressing with modified independence. 8. Pt will perform toileting task with modified independence. 9. Pt will perform toilet transfer with modified independence. 10. Pt will perform an IADL task while standing with supervision. Short Term Weekly Goals for (2/9/17 to 2/16/17) in order to increase pts functional independence and safety, and decrease burden of care:  1. Pt will perform self-feeding with modified independence. 2. Pt will perform grooming with modified independence. 3. Pt will perform UB bathing with modified independence. 4. Pt will perform LB bathing with min A.  5. Pt will perform shower transfer with min A.  6. Pt will perform UB dressing with independence. 7. Pt will perform LB dressing with min A.  8. Pt will perform toileting task with min A.  9. Pt will perform toilet transfer with min A.   OCCUPATIONAL THERAPY EXAMINATION     Patient Name: Jose Colindres  Patient Age: 64 y.o.   Past Medical History:   Past Medical History   Diagnosis Date    Acute blood loss as cause of postoperative anemia 2/7/2017    Allergic rhinitis      Anxiety disorder      Bronchial asthma      Cataract of left eye      Chronic alcoholism (HCC)      Chronic pain syndrome      Decreased calculated glomerular filtration rate (GFR) 2/6/2017       Calculated GFR is equivalent to that of CKD stage 2 = 60-89 ml/min    Depression      Dyslipidemia      Essential hypertension      Gout      Incisional hernia      Lichen simplex chronicus      Obesity, Class III, BMI 40-49.9 (morbid obesity) ([facility])      Paronychia      Primary osteoarthritis of left hip      Tobacco abuse      Type 2 diabetes mellitus without complication ([facility])           Medical Diagnosis:  Left hip fracture  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   Therapy Diagnosis:   Difficulty with ADLs  [X]     Difficulty with functional transfers  [X]     Difficulty with ambulation  [X]     Difficulty with IADLs  [X]        Problem List:    Decreased strength B UE  [X]     Decreased strength trunk/core  [X]     Decreased AROM   [X]     Decreased endurance  [ ]     Decreased balance sitting  [X]     Decreased balance standing  [X]     Pain   [X]     Decreased PROM  [ ]        Functional Limitations:   Decreased independence with ADL  [X]     Decreased independence with functional transfers  [X]     Decreased independence with ambulation  [X]     Decreased independence with IADL  [X]        Previous Functional Level:  Modified independent using RW/cane and w/c in community. Pt stated having increased difficulty with mobility and transferring in/out of tub/shower at home. Home Environment: Home Situation  Home Environment: Private residence  # Steps to Enter: 3  One/Two Story Residence: One story  Living Alone: No  Support Systems: Family member(s)  Patient Expects to be Discharged to[de-identified] Private residence  Current DME Used/Available at Home: Raleigh Ehrich, Wheelchair  Tub or Shower Type: Tub/Shower combination     Precautions: Falls; THPs; L LE WBAT     Pain at start of tx: 3/10 L LE  Pain at stop of tx: 3/10 L LE     Patient identified with name and : yes  Objective: OT evaluation completed ([id number]); see below.  ([id number]) Pt required increased encouragement to participate in therapy this PM as pt stated she was feeling tired and having pain in LEs and shoulders from earlier PT session. OT was able to convince pt to participate in session. Pt transferred to/from supine to sit with mod A for LEs and trunk. Pt sat on EOB with supervision as OT educated her on use of sock aid and reacher to nany/doff B socks. Pt required min A for proper positioning of reacher on heel when doffing sock.          Outcome Measures:                  MMT Initial Assessment    Right Upper Extremity  Left Upper Extremity    UE AROM  WFL: Grossly 3+/5  WFL; Grossly 3+/5   Shoulder flexion       Shoulder extension       Shoulder ABDuction       Shoulder ADDUction       Elbow Flexion       Elbow Extension       Wrist Extension/Flexion              0/5       No palpable muscle contraction  1/5       Palpable muscle contraction, no joint movement  2-/5      Less than full range of motion in gravity eliminated position  2/5       Able to complete full range of motion in gravity eliminated position  2+/5     Able to initiate movement against gravity  3-/5      More than half but not full range of motion against gravity  3/5       Able to complete full range of motion against gravity  3+/5     Completes full range of motion against gravity with minimal resistance  4-/5      Completes full range of motion against gravity with minimal-moderate resistance  4/5       Completes full range of motion against gravity with moderate resistance  4+/5     Completes full range of motion against gravity with moderate-maximum resistance  5/5       Completes full range of motion against gravity with maximum resistance     Sensation: B UEs intact  Coordination: B UEs intact      FIM SCORES Initial Assessment   Bladder - level of assist 3   Bladder - accident frequency score 6   Bowel - level of assist NT   Bowel - accident frequency score  NT   Pain level Numeric (0 - 10)   Pain Location   Left   Pain Description Constant   Please see IRC Interdisciplinary Eval: Coordination/Balance Section for details regarding FIM score description. COGNITION/PERCEPTION Initial Assessment   Premorbid Reading Status Literate   Premorbid Writing Status  (NT)   Arousal/Alertness Generalized responses   Orientation Level Oriented X4   Visual Fields  [facility])   Praxis Intact   Body Scheme Appears intact       COMPREHENSION MODE Initial Assessment   Primary Mode of Comprehension Auditory   Hearing Aide None   Corrective Lenses Reading glasses   Score 6       EXPRESSION Initial Assessment   Primary Mode of Expression Verbal   Score 6   Comments         SOCIAL INTERACTION/PRAGMATICS Initial Assessment   Score 6   Comments         PROBLEM SOLVING Initial Assessment   Score 5   Comments         MEMORY Initial Assessment   Score 5   Comments         EATING Initial Assessment   Functional Level 5   Comments Pt requires set-up  for opening some containers 2/2 decreased hand strength. Pt has partial dental fixtures. GROOMING Initial Assessment   Functional Level 5     Oral Hygiene FIM: 5   Tasks completed by patient Washed face, Brushed teeth   Comments Pt was able to perform grroming task with set-up of items including assistance to open toothbrush packaging. BATHING Initial Assessment   Functional Level 1 (Pt stood in shower at home.)   Body parts patient bathed Abdomen, Arm, left, Arm, right, Chest, Buttocks, Carmine area   Comments Pt able to wash UB while seated with set-up of needed items. Pt able to performed half stand with support of counter to bathe carmine/buttocks. Pt required assistance with bathing LEs 2/2 THPs. TUB/SHOWER TRANSFER INDEPENDENCE Initial Assessment   Score 0   Comments NT 2/2 pt bathed at sink this session. UPPER BODY DRESSING/UNDRESSING Initial Assessment   Functional Level 5   Items applied/Steps completed Pullover (4 steps)   Comments Set-up to provide pt clothing. Pt able to nany/doff pullover shirt while seated.        LOWER BODY DRESSING/UNDRESSING Initial Assessment   Functional Level 2     Sock and/or Shoe Management FIM:1   Items applied/Steps completed Sock, left (1 step), Sock, right (1 step), Elastic waist pants (3 steps)   Comments Pt required assistance to thread pants over LEs and socks over feet. pt stood with RW/min A to nany pants over hips with assistance for back of pants. TOILETING Initial Assessment   Functional Level 3   Comments Pt is able to manage hygiene while seated and stood for clothing management with min A/RW. OT assisted with managing pants over pt's buttocks. TOILET TRANSFER INDEPENDENCE Initial Assessment   Transfer score 3   Comments Pt required min A for stand step transfer from w/c to/from toilet with RW. Pt required mod A for sit to stand from toilet. OT will place [facility] over toilet for increased independence with sit to stand transfers from toilet. INSTRUMENTAL ADL Initial Assessment (PLOF)   Meal preparation Modified independent   Homemaking Modified independent   Medicine Management Modified independent   Financial Management Modified independent      OCCUPATIONAL THERAPY PLAN OF CARE  Areas to Assess:   Self Care/Functional transfer/IADL  [X]     NMRE/ estim  [ ]     UE Ther ex/Ther act  [X]     Cognitive Training  [ ]     Patient/Family/Caregiver Education  [X]       Order received from MD for occupational therapy services and chart reviewed. Pt to be seen 5 times per week for 3 hours of total therapy per day for 2-3 weeks. Thank you for the referral.     LTGs: See Care Plan     Pt would benefit from skilled occupational therapy in order to improve independent functional mobility/ADLs,/IADLs within the home. Interventions may include range of motion (AROM, PROM B UE), motor function (B UE/ strengthening/coordination), activity tolerance (vitals, oxygen saturation levels), balance training, ADL/IADL training and functional transfer training. Please see IRC;  Interdisciplinary Eval, Care Plan, and Patient Education for further information regarding occupational therapy examination and plan of care.       Mallorie Briceno OTR  2/9/2017

## 2017-02-09 NOTE — REHAB NOTE
[facility]    Bedside and Verbal shift change report given to [facility] LPN oncoming nurse by Erman Collet RN off going nurse. Report included the following information SBAR, Kardex, MAR and Recent Results.     Situation:   Code Status: Full Code   Reason for Admission: [address] Day: 1   Problem List:   Hospital Problems  Date Reviewed: 2/6/2017          Codes Class Noted POA    Chronic pain syndrome (Chronic) ICD-10-CM: G89.4  ICD-9-CM: 338.4  Unknown Yes        Acute blood loss as cause of postoperative anemia ICD-10-CM: D62  ICD-9-CM: 285.1  2/7/2017 Yes        Primary osteoarthritis of left hip (Chronic) ICD-10-CM: M16.12  ICD-9-CM: 715.15  Unknown Yes        Type 2 diabetes mellitus without complication (HCC) (Chronic) ICD-10-CM: E11.9  ICD-9-CM: 250.00  Unknown Yes        Essential hypertension (Chronic) ICD-10-CM: I10  ICD-9-CM: 401.9  Unknown Yes        Decreased calculated glomerular filtration rate (GFR) ICD-10-CM: R94.4  ICD-9-CM: 794.4  2/6/2017 Yes    Overview Signed 2/8/2017  5:12 PM by Jhoan Perez MD     Calculated GFR is equivalent to that of CKD stage 2 = 60-89 ml/min             * (Principal)Status post total replacement of left hip ICD-10-CM: K48.077  ICD-9-CM: V43.64  2/6/2017 Yes    Overview Signed 2/8/2017  5:13 PM by Deyanne Childes, MD Dr. Coralee Osgood             Aftercare following left hip joint replacement surgery ICD-10-CM: Z47.1, Z96.642  ICD-9-CM: V54.81, V43.64  2/6/2017 Yes    Overview Signed 2/8/2017  5:13 PM by Jhoan Perez MD     S/P Left total hip replacement (2/6/2017 - Dr. Coralee Osgood)                   Background:   Past Medical History:   Past Medical History   Diagnosis Date    Acute blood loss as cause of postoperative anemia 2/7/2017    Allergic rhinitis     Anxiety disorder     Bronchial asthma     Cataract of left eye     Chronic alcoholism (HCC)     Chronic pain syndrome     Decreased calculated glomerular filtration rate (GFR) 2/6/2017     Calculated GFR is equivalent to that of CKD stage 2 = 60-89 ml/min    Depression     Dyslipidemia     Essential hypertension     Gout     Incisional hernia     Lichen simplex chronicus     Obesity, Class III, BMI 40-49.9 (morbid obesity) ([facility] [address]     Paronychia     Primary osteoarthritis of left hip     Tobacco abuse     Type 2 diabetes mellitus without complication (HCC)       Patient taking anticoagulants yeS   Patient has a defibrillator: NO    Assessment:   Changes in Assessment throughout shift: NO     Patient has central line: NO  Insertion date:NA Last dressing date:NA   Patient has Villatoro Cath: NO  Insertion date:NA     Last Vitals:     Vitals:    02/08/17 2100   BP: 125/81   Pulse: 93   Resp: 18   Temp: 99.3 °F (37.4 °C)   SpO2: 98%        PAIN    Pain Assessment    Pain Intensity 1: 0 (02/09/17 0400) Pain Intensity 1: 2 (12/29/14 1105)    Pain Location 1: Hip Pain Location 1: Abdomen    Pain Intervention(s) 1: Medication (see MAR) Pain Intervention(s) 1: Medication (see MAR)  Patient Stated Pain Goal: 0 Patient Stated Pain Goal: 0  o Intervention effective: YES  o Other actions taken for pain: NONE     Skin Assessment  Skin color Skin Color: Appropriate for ethnicity  Condition/Temperature Skin Condition/Temp: Dry, Warm  Integrity Skin Integrity: Incision (comment)  Turgor Turgor: Non-tenting  Weekly Pressure Ulcer Documentation Weekly Pressure Ulcer Documentation: No Pressure Ulcer Noted-Pressure Ulcer Prevention Initiated  Wound Prevention & Protection Methods  Orientation of wound Orientation of Wound Prevention: Posterior  Location of Prevention Location of Wound Prevention: Sacrum/Coccyx  Dressing Present Dressing Present : No  Dressing Status    Wound Offloading Wound Offloading (Prevention Methods): Bed, pressure reduction mattress     INTAKE/OUPUT    Date 02/08/17 0700 - 02/09/17 0659 02/09/17 0700 - 02/10/17 0659   Shift [id number] 24 Hour Total 2032-2076 3747-4344 24 Hour Total   I  N  T  A  K  E   Shift Total         O  U  T  P  U  T   Urine            Urine Occurrence(s)  1 x 1 x       Stool            Stool Occurrence(s)  0 x 0 x       Shift Total         NET         Weight (kg)             Recommendations:  1. Patient needs and requests: NONE    2. Diet: DIABETIC    3. Pending tests/procedures: NONE    4. Functional Level/Equipment: WHEELCHAIR    5. Estimated Discharge Date: TO BE DETERMINE Posted on Whiteboard in Patients Room: [facility] Safety Check    A safety check occurred in the patient's room between off going nurse and oncoming nurse listed above. The safety check included the below items  Area Items   H  High Alert Medications - Verify all high alert medication drips (heparin, PCA, etc.)   E  Equipment - Suction is set up for ALL patients (with fannie)  - Red plugs utilized for all equipment (IV pumps, etc.)  - WOWs wiped down at end of shift.  - Room stocked with oxygen, suction, and other unit-specific supplies   A  Alarms - Bed alarm is set for fall risk patients  - Ensure chair alarm is in place and activated if patient is up in a chair   L  Lines - Check IV for any infiltration  - Villatoro bag is empty if patient has a Villatoro   - Tubing and IV bags are labeled   S  Safety   - Room is clean, patient is clean, and equipment is clean. - Hallways are clear from equipment besides carts. - Fall bracelet on for fall risk patients  - Ensure room is clear and free of clutter  - Suction is set up for ALL patients (with fannie)  - Hallways are clear from equipment besides carts.    - Isolation precautions followed, supplies available outside room, sign posted

## 2017-02-09 NOTE — PROGRESS NOTES
Pt is a 64year old female admitted to ARU for a left hip fracture. Pt is alert and oriented, alone in the room. Pt states that she lives with her cousin, Sarah Rowland, in a 1 level home with 3 steps to enter. Pt states that she was able to self care prior to admission and was using a cane, walker and bedside commode for mobility. Pt states that she has no history with home health or outpatient therapy. Pt states that she does not have a POA but notes that her friend Phyllis Fried ([phone]) and cousin Frank Mireles ([phone]) are [facility] contacts. Pt confirms insurance as [employer]. Sw attempted to review dc planning, insurance updates and team conference but pt was falling asleep and asked that conversation be continued later. Sw will follow up at another time.

## 2017-02-09 NOTE — PROGRESS NOTES
conducted an initial consultation and Spiritual Assessment for Juliette Aguilar, who is a 64 y.o.,female. Patients Primary Language is: [place]. According to the patients EMR Christianity Affiliation is: Dayana Floyd. The reason the Patient came to the hospital is:   Patient Active Problem List    Diagnosis Date Noted    Gout     Allergic rhinitis     Cataract of left eye     Incisional hernia     Lichen simplex chronicus     Paronychia     Chronic [facility]ism ([facility] [address]     Tobacco abuse     Chronic pain syndrome     Obesity, Class III, BMI 40-49.9 (morbid obesity) (HCC)     Anxiety disorder     Acute blood loss as cause of postoperative anemia 02/07/2017    Decreased calculated glomerular filtration rate (GFR) 02/06/2017    Status post total replacement of left hip 02/06/2017    Aftercare following left hip joint replacement surgery 02/06/2017    Primary osteoarthritis of left hip     Bronchial asthma     Type 2 diabetes mellitus without complication ([facility] [address]     Essential hypertension     Depression     Dyslipidemia         The  provided the following Interventions:  Initiated a relationship of care and support. Explored issues of dayna, belief, spirituality and Voodoo/ritual needs while hospitalized. Listened empathically. Provided chaplaincy education. Provided information about Spiritual Care Services. Offered prayer and assurance of continued prayers on patient's behalf. Chart reviewed. The following outcomes where achieved:  Patient shared limited information about both their medical narrative and spiritual journey/beliefs.  confirmed Patient's Christianity Affiliation. Patient processed feeling about current hospitalization. Patient expressed gratitude for 's visit. Assessment:  Patient does not have any Voodoo/cultural needs that will affect patients preferences in health care.   There are no spiritual or Voodoo issues which require intervention at this time. Plan:  Chaplains will continue to follow and will provide pastoral care on an as needed/requested basis.  recommends bedside caregivers page  on duty if patient shows signs of acute spiritual or emotional distress.     [address] [facility]  [phone]

## 2017-02-10 LAB
GLUCOSE BLD STRIP.AUTO-MCNC: 115 MG/DL (ref 70–110)
GLUCOSE BLD STRIP.AUTO-MCNC: 138 MG/DL (ref 70–110)
GLUCOSE BLD STRIP.AUTO-MCNC: 147 MG/DL (ref 70–110)
GLUCOSE BLD STRIP.AUTO-MCNC: 303 MG/DL (ref 70–110)
GLUCOSE BLD STRIP.AUTO-MCNC: 306 MG/DL (ref 70–110)

## 2017-02-10 PROCEDURE — 74011250636 HC RX REV CODE- 250/636: Performed by: INTERNAL MEDICINE

## 2017-02-10 PROCEDURE — 82962 GLUCOSE BLOOD TEST: CPT

## 2017-02-10 PROCEDURE — 74011000250 HC RX REV CODE- 250: Performed by: INTERNAL MEDICINE

## 2017-02-10 PROCEDURE — 65310000000 HC RM PRIVATE REHAB

## 2017-02-10 PROCEDURE — 97535 SELF CARE MNGMENT TRAINING: CPT

## 2017-02-10 PROCEDURE — 74011250637 HC RX REV CODE- 250/637: Performed by: INTERNAL MEDICINE

## 2017-02-10 PROCEDURE — 74011636637 HC RX REV CODE- 636/637: Performed by: INTERNAL MEDICINE

## 2017-02-10 RX ORDER — ALBUTEROL SULFATE 90 UG/1
2 AEROSOL, METERED RESPIRATORY (INHALATION)
Status: DISCONTINUED | OUTPATIENT
Start: 2017-02-10 | End: 2017-02-17 | Stop reason: HOSPADM

## 2017-02-10 RX ORDER — INSULIN GLARGINE 100 [IU]/ML
10 INJECTION, SOLUTION SUBCUTANEOUS
Status: DISCONTINUED | OUTPATIENT
Start: 2017-02-10 | End: 2017-02-15

## 2017-02-10 RX ORDER — METFORMIN HYDROCHLORIDE 500 MG/1
1000 TABLET ORAL 2 TIMES DAILY WITH MEALS
Status: DISCONTINUED | OUTPATIENT
Start: 2017-02-10 | End: 2017-02-17 | Stop reason: HOSPADM

## 2017-02-10 RX ADMIN — SIMVASTATIN 20 MG: 20 TABLET, FILM COATED ORAL at 20:39

## 2017-02-10 RX ADMIN — CELECOXIB 100 MG: 100 CAPSULE ORAL at 10:10

## 2017-02-10 RX ADMIN — ALBUTEROL SULFATE 2.5 MG: 2.5 SOLUTION RESPIRATORY (INHALATION) at 13:49

## 2017-02-10 RX ADMIN — BUSPIRONE HYDROCHLORIDE 20 MG: 5 TABLET ORAL at 00:00

## 2017-02-10 RX ADMIN — CELECOXIB 100 MG: 100 CAPSULE ORAL at 17:53

## 2017-02-10 RX ADMIN — MELATONIN TAB 3 MG 3 MG: 3 TAB at 20:39

## 2017-02-10 RX ADMIN — SERTRALINE HYDROCHLORIDE 100 MG: 50 TABLET ORAL at 10:10

## 2017-02-10 RX ADMIN — BUSPIRONE HYDROCHLORIDE 20 MG: 5 TABLET ORAL at 21:36

## 2017-02-10 RX ADMIN — DOCUSATE SODIUM 100 MG: 100 CAPSULE, LIQUID FILLED ORAL at 17:53

## 2017-02-10 RX ADMIN — INSULIN GLARGINE 10 UNITS: 100 INJECTION, SOLUTION SUBCUTANEOUS at 21:28

## 2017-02-10 RX ADMIN — OXYCODONE HYDROCHLORIDE AND ACETAMINOPHEN 2 TABLET: 7.5; 325 TABLET ORAL at 20:38

## 2017-02-10 RX ADMIN — PERPHENAZINE 2 MG: 2 TABLET, FILM COATED ORAL at 21:27

## 2017-02-10 RX ADMIN — BUDESONIDE AND FORMOTEROL FUMARATE DIHYDRATE 2 PUFF: 160; 4.5 AEROSOL RESPIRATORY (INHALATION) at 08:00

## 2017-02-10 RX ADMIN — OXYCODONE HYDROCHLORIDE AND ACETAMINOPHEN 1 TABLET: 7.5; 325 TABLET ORAL at 06:52

## 2017-02-10 RX ADMIN — ALLOPURINOL 300 MG: 100 TABLET ORAL at 10:10

## 2017-02-10 RX ADMIN — FAMOTIDINE 20 MG: 20 TABLET ORAL at 17:53

## 2017-02-10 RX ADMIN — LORATADINE 10 MG: 10 TABLET ORAL at 10:10

## 2017-02-10 RX ADMIN — VITAMIN D, TAB 1000IU (100/BT) 2000 UNITS: 25 TAB at 10:10

## 2017-02-10 RX ADMIN — THERA TABS 1 TABLET: TAB at 10:11

## 2017-02-10 RX ADMIN — BUSPIRONE HYDROCHLORIDE 20 MG: 5 TABLET ORAL at 10:10

## 2017-02-10 RX ADMIN — BUSPIRONE HYDROCHLORIDE 20 MG: 5 TABLET ORAL at 17:53

## 2017-02-10 RX ADMIN — FAMOTIDINE 20 MG: 20 TABLET ORAL at 10:10

## 2017-02-10 RX ADMIN — AMLODIPINE BESYLATE 5 MG: 5 TABLET ORAL at 10:10

## 2017-02-10 RX ADMIN — DOCUSATE SODIUM 100 MG: 100 CAPSULE, LIQUID FILLED ORAL at 10:11

## 2017-02-10 RX ADMIN — METFORMIN HYDROCHLORIDE 500 MG: 500 TABLET, FILM COATED ORAL at 10:10

## 2017-02-10 RX ADMIN — Medication 250 MG: at 10:10

## 2017-02-10 RX ADMIN — METFORMIN HYDROCHLORIDE 1000 MG: 500 TABLET, FILM COATED ORAL at 17:53

## 2017-02-10 RX ADMIN — INSULIN LISPRO 8 UNITS: 100 INJECTION, SOLUTION INTRAVENOUS; SUBCUTANEOUS at 12:25

## 2017-02-10 RX ADMIN — ENOXAPARIN SODIUM 40 MG: 100 INJECTION SUBCUTANEOUS at 06:42

## 2017-02-10 RX ADMIN — Medication 325 MG: at 08:00

## 2017-02-10 NOTE — PROGRESS NOTES
Problem: Self Care Deficits Care Plan (Adult)  Goal: *Therapy Goal (Edit Goal, Insert Text)  Long Term Goals (to be met upon discharge date) in order to increase pts functional independence and safety, and decrease burden of care:  1. Pt will perform self-feeding with modified independence. 2. Pt will perform grooming with modified independence. 3. Pt will perform UB bathing with modified independence. 4. Pt will perform LB bathing with modified independence. 5. Pt will perform shower transfer with supervision. 6. Pt will perform UB dressing with independence. 7. Pt will perform LB dressing with modified independence. 8. Pt will perform toileting task with modified independence. 9. Pt will perform toilet transfer with modified independence. 10. Pt will perform an IADL task while standing with supervision. Short Term Weekly Goals for (17 to 17) in order to increase pts functional independence and safety, and decrease burden of care:  1. Pt will perform self-feeding with modified independence. 2. Pt will perform grooming with modified independence. 3. Pt will perform UB bathing with modified independence. 4. Pt will perform LB bathing with min A.  5. Pt will perform shower transfer with min A.  6. Pt will perform UB dressing with independence.   7. Pt will perform LB dressing with min A.  8. Pt will perform toileting task with min A.  9. Pt will perform toilet transfer with min A.   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 OT communicated pt's concern to nurse and MD. According to MD, Pt is unable to take this medication due to toxic levels in her systems which pt had discussed this with pt yesterday. Pt continued to refuse to participate in therapy at this time. PM session, pt refused OT as pt stated she was having difficulty breathing as she rested in bed. Nursing aware. Objective:      TOILETING Daily Assessment     Toileting  Toileting Assistance (FIM Score):  (CGA for standing balance with RW)  Cues:  (Pt able to manage pants with increased time.)       MOBILITY/TRANSFERS Daily Assessment     Functional Transfers  Toilet Transfer :  (Stand step transfer with RW to/from w/c and BSC over toilet)  Amount of Assistance Required: 4 (Minimal assistance) (Cues for RW/LE positioning and hand placement)      Assessment: Pt refuses to participate in therapy as pt stated she needs to take certain medication for her asthma despite MD education; -5 units. Plan of Care:  Continue POC to maximize pt's independence and safety during performance of ADLs and functional transfers/mobility.      Verónica Balbuena OTR  2/10/2017

## 2017-02-10 NOTE — CONSULTS
ARU PSYCHOLOGICAL SCREENING    Assessment Initiated:  February 9, 2017    Rehab Diagnosis:  Left THR    Pertinent Physical/Psychiatric History:     Patient Active Problem List   Diagnosis Code    Primary osteoarthritis of left hip M16.12    Bronchial asthma J45.909    Type 2 diabetes mellitus without complication ([facility]) O18.5    Essential hypertension I10    Depression F32.9    Dyslipidemia E78.5    Decreased calculated glomerular filtration rate (GFR) R94.4    Status post total replacement of left hip Z96.642    Aftercare following left hip joint replacement surgery Z47.1, S45.615    Acute blood loss as cause of postoperative anemia D62    Gout M10.9    Allergic rhinitis J30.9    Cataract of left eye H26.9    Incisional hernia O79.6    Lichen simplex chronicus L28.0    Paronychia L03.019    Chronic alcoholism ([facility]) F10.20    History of tobacco abuse Z87.891    Chronic pain syndrome G89.4    Obesity, Class III, BMI 40-49.9 (morbid obesity) ([facility]) E66.01    Anxiety disorder F41.9    Encounter for monitoring of theophylline therapy Z51.81, Z79.899    Prerenal azotemia R79.89       Patient is currently receiving psychiatric services from Dr. Pavan Mooney at [facility] in [place], [place] and she reports diagnosis for Schizoaffective Disorder; she is currently Rx Trilafon and Zoloft which apparently will be continued on ARU; patient is already receiving Social Security Disability benefit. She stopped tobacco use in September 2016, and occasionally consumes alcohol and uses marijuana. OBJECTIVE  GENERAL OBSERVATIONS  Willingness to participate in program: [] good   [x] fair [] indifferent [] poor    General Appearance:  Patient observed obese, dressed casually in combination of greens and street clothes, and lying supine in bed with head elevated. Sensory Impairments:  Speech is spontaneous and intelligible and she appears to have satisfactory auditory reception.     Tenriism Affiliation:  [facility] Assessment  Discharge Status   [x] alert  [] lethargic  [] difficult to arouse  [] fluctuating  [] other: Level of Consciousness [x] alert  [] lethargic  [] difficult to arouse  [] fluctuating  [] other:   [x] person  [x] place  [x] time  [x] situation Oriented [x] person  [x] place  [x] time  [x] situation   [x] within normal limits  [] impaired       [] mild        [] moderate        [] severe Attention [x] within normal limits  [] impaired       [] mild        [] moderate        [] severe   [x] within normal limits  [x] impaired       [x] mild        [] moderate        [] severe Memory [x] within normal limits  [x] impaired       [x] mild        [] moderate        [] severe   [] appropriate to situation  [] depressed  [x] anxious  [x] angry   [] fearful  [] emotionally labile  [] other:  Mood [] appropriate to situation  [] depressed  [] anxious  [x] angry   [] fearful  [] emotionally labile  [] other:   [x] appropriate  [] flat  [] inappropriate to content of speech Affect [x] appropriate  [] flat  [] inappropriate to content of speech   [x] appropriate  [] aggressive/agitated  [] withdrawn  [] inappropriate  [x] other: Patient may display increased dependency issues with recovery and therefore require behavioral redirection to do more for herself. Behavior [] appropriate  [x] aggressive/agitated  [] withdrawn  [] inappropriate  [] other:   [] good  [x] limited  [] denial  [] none Insight Into Illness [] good  [x] limited  [] denial  [] none   [x] intact  [x] impaired       [x] mild        [] moderate        [] severe       Describe: Patient will benefit from cues and prompts to maximize her effort in therapy.  Cognition [x] intact  [x] impaired       [x] mild        [] moderate        [] severe       Describe:    [x] coping  [] demonstrates poor adjustment  [] undetermined       As evidenced by:  Patient Adjustment to Disability [] coping  [x] demonstrates poor adjustment  [] undetermined       As evidenced by: Patient insisting on discharge from ARU, Against Medical Advice   [x] coping  [] demonstrates poor adjustment  [] undetermined      As evidenced by: Patient feels especially well supported by her only son Luwanna Kanner thinks he's my parent. \" Family Adjustment to Disability [] coping  [] demonstrates poor adjustment  [x] undetermined      As evidenced by:      ASSESSMENT  Clinical Impression:  Patient is a 64year old, single, unemployed (and receiving Social Security Disability secondary to mental health),  female; she resides with a male cousin (who is reportedly s/p CVA and limited in function) in [place] in one level residence with three steps to enter. Patient's only child, a son, also resides locally and is very supportive of patient. At this time, patient reports that she is stressed by current medical events and need for acute rehabilitation, but also by the recent death of her mother in January, 2017. Patient will benefit from education to better understand the parameters of her recovery and in order best identify realistic goals for herself in rehabilitation. Emotionally, patient reports long standing history of mental health treatment: she is currently followed by Dr. Radha Salomon at [facility] in [place], supposedly diagnosed with Schizoaffective Disorder and currently Rx Trilafon and Zoloft, for mood and thought stabilization. Patient expects that these medications will continue for her on ARU as per Dr. Gilford Holly and that she will continue with Dr. Radha Salomon post hospitalization. In addition, patient reports the very recent death of her mother, in January 2017, and that she is naturally still grieving. As her efforts in acute rehabilitation will only create more stress for her, she will benefit from support and monitoring to ensure appropriate effort and perseverance in her scheduled therapy.   Parenthetically, there is the possibility that patient may not initiate as she is able and, perhaps, expect support from others when she actually might be able to do for herself. Thus, behavioral cues and prompts will help to maximize her initiation and effort in therapy and on unit. Cognitively, patient is alert, oriented, and able to understand and follow simple direction. However, she may become confused with complex or novel directions and she will therefore benefit from daily review and reinforcement of information/direction that has already been instructed or offered to her. Patient Strengths:  Alert, oriented, cooperative and at least partially motivated to improve. Patient Preferences:  Return to residence in [place], although support for her is uncertain on admission. Rehab Potential:  Fair, depending on initiation, perseverance and the complications of situational stressors. Educational Needs: Under each heading list the specific items in which the patient or family will need education/training.  Example: hip precautions, use of walker, ADL equipment, neglect, judgment, adjustment, etc.     Special considerations or accommodations for teaching:  [x] Yes     [] No     [] NA  If Yes, explain: History of emotional instability and current medical and other, personal stressors Discharge Status    Completed Demonstrated/ Verbalized Understanding    Yes No Yes No   Info regarding disability: Limited insight about recovery [] [x] [] [x]   Adjustment: Power versus dependence [] [x] [] [x]   Cognition: Possibly overwhelmed with complexity of direction [] [x] [] [x]   Other: History of emotional instability [] [x] [] [x]   Other: Safety [] [x] [] [x]   If education not completed, explain: Patient chose to discharge from ARU, AMA, and did not complete education. [] [] [] []     PLAN  Problem: Limited insight  Long Term Goal: Increase insight about recovery  Intervention: Patient education  At Discharge  LTG Achieved: [] Yes [x] No If not achieved, explain: Abrupt discharge, AMA. Problem: Safety  Long Term Goal: Maximize safety awareness  Intervention: Educate and reinforce  At Discharge  LTG Achieved: [] Yes [x] No If not achieved, explain: As above  Problem: [place] versus dependency behaviors  Long Term Goal: Identify and pursue independent behaviors, as appropriate, including initiation  Intervention: Behavioral direction and reinforcement  At Discharge  LTG Achieved: [] Yes [x] No If not achieved, explain: As above    Problem: History of mood instability  Long Term Goal: Mood stability  Intervention: Rx and support   At Discharge  LTG Achieved: [] Yes [x] No If not achieved, explain: As above    Problem: Complication of a complex direction  Long Term Goal: Understand and follow all direction  Intervention: Educate:  reinforce all directions in therapy  At Discharge  LTG Achieved: [] Yes [x] No If not achieved, explain: As above    Sydney Virgen, THE [facility]  2/10/2017 8:47 AM    DISCHARGE STATUS    Clinical Impressions: Patient opted to discharge from ARU, against medical advice, and was not amenable to efforts in helping her to better understand the efficacy of continued treatment effort. Any and all attempt to do so was met with resistance and patient's essential lack of interest in further consideration.     Follow-up Services Recommended Purpose                 Sydney Virgen, PHD  Discharge Date/Time:

## 2017-02-10 NOTE — PROGRESS NOTES
Problem: Mobility Impaired (Adult and Pediatric)  Goal: *Acute Goals and Plan of Care (Insert Text)  Physical Therapy Short Term Goals  Initiated 2/9/2017 and to be accomplished within 7 day(s) (02/16/2017)  1. Patient will move from supine to sit and sit to supine , scoot up and down and roll side to side in bed with moderate assistance . 2. Patient will transfer from bed to chair and chair to bed with moderate assistance using the least restrictive device and safe technique. 3. Patient will perform sit to stand with minimal assistance/contact guard assist.  4. Patient will ambulate with maximal assistance for 15 feet with the least restrictive device. Physical Therapy Long Term Goals  Initiated 2/9/2017 and to be accomplished within 21 day(s) (03/02/2017)  1. Patient will move from supine to sit and sit to supine , scoot up and down and roll side to side in bed with minimal assistance/contact guard assist.   2. Patient will transfer from bed to chair and chair to bed with minimal assistance/contact guard assist using the least restrictive device. 3. Patient will perform sit to stand with supervision/set-up. 4. Patient will ambulate with moderate assistance for 50 feet with the least restrictive device. 5. Patient will ascend/descend 4 stairs with 2 handrail(s) with moderate assistance . PT Attempt:     Patient refuses treatment attempt x 2 reporting wheezing stating she cannot participate today. Wheezing noted, nursing notified for assessment.  6 units lost.        Heather Valenzuela PT DPT  02/10/2017 9700

## 2017-02-10 NOTE — REHAB NOTE
[facility] PHYSICAL REHABILITATION  [address] [place] [address]  OVERALL PLAN OF CARE    Name: Ivett Sierra CSN: [id number]   Age: 64 y.o. MRN: [id number]   Sex: female Admit Date: 2/8/2017     Primary Rehab Diagnosis  1. Impaired Mobility and ADLs  2. Primary osteoarthritis of the left hip  3. S/P Left total hip replacement (2/6/2017 - Dr. Rossy Alvarado)      Comorbidities   Bronchial asthma    Type 2 diabetes mellitus without complication    Essential hypertension    Depression    Dyslipidemia    Decreased calculated glomerular filtration rate (GFR)    Aftercare following left hip joint replacement surgery    Acute blood loss as cause of postoperative anemia    Gout    Allergic rhinitis    Cataract of left eye    Incisional hernia    Lichen simplex chronicus    Paronychia    Chronic alcoholism    Tobacco abuse    Chronic pain syndrome    Obesity, Class III, BMI 40-49.9 (morbid obesity)     Anxiety disorder    Encounter for monitoring of theophylline therapy    Prerenal azotemia       ANTICIPATED INTERVENTIONS THAT SUPPORT THE MEDICAL NECESSITY OF THIS ADMISSION:    I. Physical Therapy              A. Intensity: 1.5 hour per day              B. Frequency: 5 times per week              C. Duration: 3 weeks              D. Long Term Goals:    1. Patient will move from supine to sit and sit to supine , scoot up and down and roll side to side in bed with minimal assistance/contact guard assist.     2. Patient will transfer from bed to chair and chair to bed with minimal assistance/contact guard assist using the least restrictive device. 3. Patient will perform sit to stand with supervision/set-up. 4. Patient will ambulate with moderate assistance for 50 feet with the least restrictive device. 5. Patient will ascend/descend 4 stairs with 2 handrail(s) with moderate assistance . E.  Interventions: Interventions may include range of motion (AROM, PROM B LE/trunk), motor function (B LE/trunk strengthening/coordination), activity tolerance (vitals, oxygen saturation levels), bed mobility training, balance activities, gait training (progressive ambulation program), and functional transfer training. II. Occupational Therapy  21 . Intensity: 1.5 hour per day              B. Frequency: 5 times per week              C. Duration: 2-3 weeks              D. Long Term Goals:    1. Pt will perform self-feeding with modified independence. 2. Pt will perform grooming with modified independence. 3. Pt will perform UB bathing with modified independence. 4. Pt will perform LB bathing with modified independence. 5. Pt will perform shower transfer with supervision. 6. Pt will perform UB dressing with independence. 7. Pt will perform LB dressing with modified independence. 8. Pt will perform toileting task with modified independence. 9. Pt will perform toilet transfer with modified independence. 10. Pt will perform an IADL task while standing with supervision. E. Interventions: Interventions may include range of motion (AROM, PROM B UE), motor function (B UE/ strengthening/coordination), activity tolerance (vitals, oxygen saturation levels), balance training, ADL/IADL training and functional transfer training. PHYSICIAN'S ASSESSMENT OF FINDINGS:    Are the established goals sufficient for achieving the optimal level of function? [x]  Yes      []  No    What changes would you recommend to the goals as written? None      Are the interventions noted sufficient for achieving the optimal level of function? [x]  Yes      []  No    What changes would you recommend to the interventions noted? If therapy staff is unable to provide 3 hr of total therapy per day in 5 days due to medical issues or decreased patient tolerance, may modify treatment schedule to 15 hr/week.       Estimated length of stay: 2 weeks      Medical rehabilitation prognosis:    []  Excellent     [x]  Good     []  Fair     []  Guarded       Discharge Destination:     [x]  Home     []  [address]     []  [facility]     []  [facility]     []  [place]     []  Other:       Signed:    Verenice Verdugo MD    February 10, 2017

## 2017-02-10 NOTE — PROGRESS NOTES
Problem: Mobility Impaired (Adult and Pediatric)  Goal: *Acute Goals and Plan of Care (Insert Text)  Physical Therapy Short Term Goals  Initiated 2/9/2017 and to be accomplished within 7 day(s) (02/16/2017)  1. Patient will move from supine to sit and sit to supine , scoot up and down and roll side to side in bed with moderate assistance . 2. Patient will transfer from bed to chair and chair to bed with moderate assistance using the least restrictive device and safe technique. 3. Patient will perform sit to stand with minimal assistance/contact guard assist.  4. Patient will ambulate with maximal assistance for 15 feet with the least restrictive device. Physical Therapy Long Term Goals  Initiated 2/9/2017 and to be accomplished within 21 day(s) (03/02/2017)  1. Patient will move from supine to sit and sit to supine , scoot up and down and roll side to side in bed with minimal assistance/contact guard assist.   2. Patient will transfer from bed to chair and chair to bed with minimal assistance/contact guard assist using the least restrictive device. 3. Patient will perform sit to stand with supervision/set-up. 4. Patient will ambulate with moderate assistance for 50 feet with the least restrictive device. 5. Patient will ascend/descend 4 stairs with 2 handrail(s) with moderate assistance . PT attempt note:     Patient refuses to participate in therapy at this time stating \"I feel like I will have an attack if I do any therapy. \" Patient reports that she will need medication for her asthma to participate though she notes that she has discussed this with the MD and it is not currently safe to take this medication. Patient refuses participation with maximum encouragement, offers to modify treatment session, and offers to discuss with nursing. Patient agrees to allow PT to re-attempt treatment later on today though she notes \"I may try tomorrow, but definitely not today. \" upon ending interaction.      Fina Ramos Zapf PT DPT  02/10/2017 1118

## 2017-02-10 NOTE — ROUTINE PROCESS
SHIFT CHANGE NOTE FOR [facility]    Bedside and Verbal shift change report given to Mathieu Curtis RN (oncoming nurse) by Constantin Sims RN   (offgoing nurse). Report included the following information SBAR, Kardex, MAR and Recent Results.     Situation:   Code Status: Full Code   Reason for Admission: Left Hip Replacement  Hospital Day: 2   Problem List:   Hospital Problems  Date Reviewed: 2/9/2017          Codes Class Noted POA    Encounter for monitoring of theophylline therapy ICD-10-CM: Z51.81, Z79.899  ICD-9-CM: V58.83, V58.69  2/9/2017 Yes    Overview Signed 2/9/2017 12:35 PM by Jose Antonio Trujillo MD     Theophylline level (2/09/2017) = 21.0             Prerenal azotemia ICD-10-CM: R79.89  ICD-9-CM: 790.6  2/9/2017 Yes    Overview Signed 2/9/2017 12:35 PM by Jose Antonio Trujillo MD     BUN/Creatinine ratio (2/09/2017) = 30 (BUN 25, Creatinine 0.84)              Chronic pain syndrome (Chronic) ICD-10-CM: G89.4  ICD-9-CM: 338.4  Unknown Yes        Acute blood loss as cause of postoperative anemia ICD-10-CM: D62  ICD-9-CM: 285.1  2/7/2017 Yes        Primary osteoarthritis of left hip (Chronic) ICD-10-CM: M16.12  ICD-9-CM: 715.15  Unknown Yes        Type 2 diabetes mellitus without complication (HCC) (Chronic) ICD-10-CM: E11.9  ICD-9-CM: 250.00  Unknown Yes        Essential hypertension (Chronic) ICD-10-CM: I10  ICD-9-CM: 401.9  Unknown Yes        Decreased calculated glomerular filtration rate (GFR) ICD-10-CM: R94.4  ICD-9-CM: 794.4  2/6/2017 Yes    Overview Signed 2/8/2017  5:12 PM by Jose Antonio Trujillo MD     Calculated GFR is equivalent to that of CKD stage 2 = 60-89 ml/min             * (Principal)Status post total replacement of left hip ICD-10-CM: X05.455  ICD-9-CM: V43.64  2/6/2017 Yes    Overview Signed 2/8/2017  5:13 PM by MichMD Dr. Otf Polanco             Aftercare following left hip joint replacement surgery ICD-10-CM: Z47.1, V59.451  ICD-9-CM: V54.81, V43.64  2/6/2017 Yes    Overview Signed 2/8/2017  5:13 PM by Carlo Sue MD     S/P Left total hip replacement (2/6/2017 - Dr. Shania Goldman)                   Background:   Past Medical History:   Past Medical History   Diagnosis Date    Acute blood loss as cause of postoperative anemia 2/7/2017    Allergic rhinitis     Anxiety disorder     Bronchial asthma     Cataract of left eye     Chronic alcoholism (HCC)     Chronic pain syndrome     Decreased calculated glomerular filtration rate (GFR) 2/6/2017     Calculated GFR is equivalent to that of CKD stage 2 = 60-89 ml/min    Depression     Dyslipidemia     Essential hypertension     Gout     History of tobacco abuse     Incisional hernia     Lichen simplex chronicus     Obesity, Class III, BMI 40-49.9 (morbid obesity) ([address]     Paronychia     Primary osteoarthritis of left hip     Type 2 diabetes mellitus without complication ([address]       Patient taking anticoagulants yes Lovenox   Patient has a defibrillator: no     Assessment:   Changes in Assessment throughout shift: no acute changes noted throughout the shift     Patient has central line: no Reasons if yes: n/a     Patient has Villatoro Cath: no Reasons if yes: n/a        Last Vitals:     Vitals:    02/09/17 1118 02/09/17 1622 02/09/17 2120 02/09/17 2302   BP:  127/71 133/75 145/84   Pulse:  89 87 85   Resp:  18 18 18   Temp:  98.8 °F (37.1 °C) 97.7 °F (36.5 °C)    SpO2:  93% 92% 91%   Weight: 105.7 kg (233 lb)      Height: 5' 3\" (1.6 m)           PAIN    Pain Assessment    Pain Intensity 1: 5 (02/10/17 0000) Pain Intensity 1: 2 (12/29/14 1105)    Pain Location 1: Leg Pain Location 1: Abdomen    Pain Intervention(s) 1: Medication (see MAR) Pain Intervention(s) 1: Medication (see MAR)  Patient Stated Pain Goal: 0 Patient Stated Pain Goal: 0  o Intervention effective: yes    o Other actions taken for pain: repositioning      Skin Assessment  Skin color Skin Color: Appropriate for ethnicity  Condition/Temperature Skin Condition/Temp: Dry, Warm  Integrity Skin Integrity: Incision (comment)  Turgor Turgor: Non-tenting  Weekly Pressure Ulcer Documentation Weekly Pressure Ulcer Documentation: No Pressure Ulcer Noted-Pressure Ulcer Prevention Initiated  Wound Prevention & Protection Methods  Orientation of wound Orientation of Wound Prevention: Posterior  Location of Prevention Location of Wound Prevention: Sacrum/Coccyx  Dressing Present Dressing Present : No  Dressing Status    Wound Offloading Wound Offloading (Prevention Methods): Bed, pressure redistribution/air, Repositioning     INTAKE/OUPUT    Date 02/09/17 0700 - 02/10/17 0659 02/10/17 0700 - 02/11/17 0659   Shift 0700-1859 1900-0659 24 Hour Total 0700-1859 1900-0659 24 Hour Total   I  N  T  A  K  E   P. O. 540  540         P. O. 540  540       Shift Total  (mL/kg) 540  (5.1)  540  (5.1)      O  U  T  P  U  T   Urine  (mL/kg/hr)            Urine Occurrence(s) 1 x 1 x 2 x       Stool            Stool Occurrence(s) 1 x 0 x 1 x       Shift Total  (mL/kg)           540      Weight (kg) 105.7 105.7 105.7 105.7 105.7 105.7       Recommendations:  1. Patient needs and requests: none    2. Diet: Diabetic diet, thin liquids     3. Pending tests/procedures: labs     4. Functional Level/Equipment: one person assist with transfers     5. Estimated Discharge Date: TBD Posted on Whiteboard in Patients Room: [facility] Safety Check    A safety check occurred in the patient's room between off going nurse and oncoming nurse listed above.     The safety check included the below items  Area Items   H  High Alert Medications - Verify all high alert medication drips (heparin, PCA, etc.)   E  Equipment - Suction is set up for ALL patients (with yanker)  - Red plugs utilized for all equipment (IV pumps, etc.)  - WOWs wiped down at end of shift.  - Room stocked with oxygen, suction, and other unit-specific supplies   A  Alarms - Bed alarm is set for fall risk patients  - Ensure chair alarm is in place and activated if patient is up in a chair   L  Lines - Check IV for any infiltration  - Villatoro bag is empty if patient has a Villatoro   - Tubing and IV bags are labeled   S  Safety   - Room is clean, patient is clean, and equipment is clean. - Hallways are clear from equipment besides carts. - Fall bracelet on for fall risk patients  - Ensure room is clear and free of clutter  - Suction is set up for ALL patients (with yanker)  - Hallways are clear from equipment besides carts.    - Isolation precautions followed, supplies available outside room, sign posted         Rupinder Greenwood RN

## 2017-02-10 NOTE — PROGRESS NOTES
FALL ASSESSMENT NOTE  [facility] EVMS Service    Patient: Shraddha Underwoodpool MRN: [id number]   SSN: [id number]  YOB: 1955   Age: 64 y.o. Sex: female    Admit date: 2/8/2017 Length of stay:  LOS: 1 day    PCP: Vinod Shepherd NP PP: Status post total replacement of left hip     Subjective:   Called to bedside by nursing staff for fall evaluation. Staff states pt was attempting to transfer from bed to the wheelchair when pt's legs gave out, staff was assisting, when pt slumped to floor with staff assistance and landed on buttocks. Report there was no significant trauma, pt landed on buttocks, not on hip. Pt has no complaints, concurs with events above. Denies any significant injury, back pain. Admits to L hip and L thigh soreness. Objective:   Vital Signs:  Visit Vitals    /84 (BP 1 Location: Left arm, BP Patient Position: At rest)    Pulse 85    Temp 97.7 °F (36.5 °C)    Resp 18    Ht 5' 3\" (1.6 m)    Wt 105.7 kg (233 lb)    SpO2 91%    BMI 41.27 kg/m2       Physical Exam:  General appearance: alert, cooperative, no distress, appears stated age  Lungs: clear to auscultation bilaterally  Heart: regular rate and rhythm, S1, S2 normal, no murmur, click, rub or gallop  Abdomen: soft, non-tender. Bowel sounds normal. No masses,  no organomegaly  Pulses: 2+ and symmetric  Skin: Skin color, texture, turgor normal. No rashes or lesions  Neuro:  normal without focal findings  mental status, speech normal, alert and oriented x iii  cranial nerves 2-12 intact  muscle tone and strength normal and symmetric    Assessment and Plan:   64 y.o. yo female admitted for Status post total replacement of left hip s/p fall in pt room. Per patient report, nursing, and my assessment, patient is stable and at baseline condition as prior to the fall. /84. Pt stable and doing well at this time. Fall precautions in place.  In my assessment, the patient is without complications caused from this event. Nursing has been instructed to notify the attending and my clearance of this patient.      Ajit Chase DO   [facility]   February 9, 2017

## 2017-02-10 NOTE — ROUTINE PROCESS
SHIFT CHANGE NOTE FOR [facility]    Bedside and Verbal shift change report given to Maricruz Riggs RN (oncoming nurse) by Desire Shaw RN   (offgoing nurse). Report included the following information SBAR, Kardex, MAR and Recent Results.     Situation:   Code Status: Full Code   Reason for Admission: left hip replacement  Hospital Day: 2   Problem List:   Hospital Problems  Date Reviewed: 2/10/2017          Codes Class Noted POA    Encounter for monitoring of theophylline therapy ICD-10-CM: Z51.81, Z79.899  ICD-9-CM: V58.83, V58.69  2/9/2017 Yes    Overview Signed 2/9/2017 12:35 PM by Cleo Jacobsen MD     Theophylline level (2/09/2017) = 21.0             Prerenal azotemia ICD-10-CM: R79.89  ICD-9-CM: 790.6  2/9/2017 Yes    Overview Signed 2/9/2017 12:35 PM by Cleo Jacobsen MD     BUN/Creatinine ratio (2/09/2017) = 30 (BUN 25, Creatinine 0.84)              Chronic pain syndrome (Chronic) ICD-10-CM: G89.4  ICD-9-CM: 338.4  Unknown Yes        Acute blood loss as cause of postoperative anemia ICD-10-CM: D62  ICD-9-CM: 285.1  2/7/2017 Yes        Primary osteoarthritis of left hip (Chronic) ICD-10-CM: M16.12  ICD-9-CM: 715.15  Unknown Yes        Type 2 diabetes mellitus without complication (HCC) (Chronic) ICD-10-CM: E11.9  ICD-9-CM: 250.00  Unknown Yes        Essential hypertension (Chronic) ICD-10-CM: I10  ICD-9-CM: 401.9  Unknown Yes        Decreased calculated glomerular filtration rate (GFR) ICD-10-CM: R94.4  ICD-9-CM: 794.4  2/6/2017 Yes    Overview Signed 2/8/2017  5:12 PM by Cleo Jacobsen MD     Calculated GFR is equivalent to that of CKD stage 2 = 60-89 ml/min             * (Principal)Status post total replacement of left hip ICD-10-CM: E22.848  ICD-9-CM: V43.64  2/6/2017 Yes    Overview Signed 2/8/2017  5:13 PM by GalloMD Dr. Johnie Redman             Aftercare following left hip joint replacement surgery ICD-10-CM: Z47.1, H34.572  ICD-9-CM: V54.81, V43.64  2/6/2017 Yes    Overview Signed 2/8/2017  5:13 PM by Bala Lee MD     S/P Left total hip replacement (2/6/2017 - Dr. Ayesha Hurt)                   Background:   Past Medical History:   Past Medical History   Diagnosis Date    Acute blood loss as cause of postoperative anemia 2/7/2017    Allergic rhinitis     Anxiety disorder     Bronchial asthma     Cataract of left eye     Chronic [address] [address]     Chronic pain syndrome     Decreased calculated glomerular filtration rate (GFR) 2/6/2017     Calculated GFR is equivalent to that of CKD stage 2 = 60-89 ml/min    Depression     Dyslipidemia     Essential hypertension     Gout     History of tobacco abuse     Incisional hernia     Lichen simplex chronicus     Obesity, Class III, BMI 40-49.9 (morbid obesity) ([facility]     Paronychia     Primary osteoarthritis of left hip     Type 2 diabetes mellitus without complication ([facility]       Patient taking anticoagulants yes    Patient has a defibrillator: no     Assessment:   Changes in Assessment throughout shift: No     Patient has central line: no Reasons if yes: Insertion date: Last dressing date:   Patient has Villatoro Cath: no Reasons if yes:     Insertion date:     Last Vitals:     Vitals:    02/09/17 2120 02/09/17 2302 02/10/17 0803 02/10/17 1642   BP: 133/75 145/84 130/75 154/85   Pulse: 87 85 80 88   Resp: 18 18 24 23   Temp: 97.7 °F (36.5 °C)  97.9 °F (36.6 °C) 97.6 °F (36.4 °C)   SpO2: 92% 91% 90% 97%   Weight:       Height:            PAIN    Pain Assessment    Pain Intensity 1: 0 (02/10/17 1600) Pain Intensity 1: 2 (12/29/14 1105)    Pain Location 1: Leg Pain Location 1: Abdomen    Pain Intervention(s) 1: Medication (see MAR) Pain Intervention(s) 1: Medication (see MAR)  Patient Stated Pain Goal: 0 Patient Stated Pain Goal: 0  o Intervention effective: yes    o Other actions taken for pain: No     Skin Assessment  Skin color Skin Color: Appropriate for ethnicity  Condition/Temperature Skin Condition/Temp: Dry, Warm  Integrity Skin Integrity: Incision (comment)  Turgor Turgor: Non-tenting  Weekly Pressure Ulcer Documentation Weekly Pressure Ulcer Documentation: No Pressure Ulcer Noted-Pressure Ulcer Prevention Initiated  Wound Prevention & Protection Methods  Orientation of wound Orientation of Wound Prevention: Posterior  Location of Prevention Location of Wound Prevention: Sacrum/Coccyx  Dressing Present Dressing Present : No  Dressing Status    Wound Offloading Wound Offloading (Prevention Methods): Bed, pressure redistribution/air, Chair cushion, Pillows, Turning, Wheelchair     INTAKE/OUPUT    Date 02/09/17 0700 - 02/10/17 0659 02/10/17 0700 - 02/11/17 0659   Shift 0700-1859 1900-0659 24 Hour Total 0700-1859 1900-0659 24 Hour Total   I  N  T  A  K  E   P. O. 540  540         P. O. 540  540       Shift Total  (mL/kg) 540  (5.1)  540  (5.1)      O  U  T  P  U  T   Urine  (mL/kg/hr)            Urine Occurrence(s) 1 x 1 x 2 x 1 x  1 x    Stool            Stool Occurrence(s) 1 x 0 x 1 x 0 x  0 x    Shift Total  (mL/kg)           540      Weight (kg) 105.7 105.7 105.7 105.7 105.7 105.7       Recommendations:  1. Patient needs and requests: education    2. Diet: diabetic    3. Pending tests/procedures: No     4. Functional Level/Equipment: 1 x person assist    5. Estimated Discharge Date: TDB Posted on Whiteboard in Patients Room: no     [facility] Safety Check    A safety check occurred in the patient's room between off going nurse and oncoming nurse listed above.     The safety check included the below items  Area Items   H  High Alert Medications - Verify all high alert medication drips (heparin, PCA, etc.)   E  Equipment - Suction is set up for ALL patients (with yanker)  - Red plugs utilized for all equipment (IV pumps, etc.)  - WOWs wiped down at end of shift.  - Room stocked with oxygen, suction, and other unit-specific supplies   A  Alarms - Bed alarm is set for fall risk patients  - Ensure chair alarm is in place and activated if patient is up in a chair   L  Lines - Check IV for any infiltration  - Villatoro bag is empty if patient has a Villatoro   - Tubing and IV bags are labeled   S  Safety   - Room is clean, patient is clean, and equipment is clean. - Hallways are clear from equipment besides carts. - Fall bracelet on for fall risk patients  - Ensure room is clear and free of clutter  - Suction is set up for ALL patients (with yanker)  - Hallways are clear from equipment besides carts.    - Isolation precautions followed, supplies available outside room, sign posted

## 2017-02-10 NOTE — PROGRESS NOTES
[address]  [address] [place] [address], [address]     INPATIENT REHABILITATION  DAILY PROGRESS NOTE     Date: 2/10/2017    Name: Capo Eli Age / Sex: 64 y.o. / female   CSN: [id number] MRN: [id number]   [facility] Date: 2/8/2017 Length of Stay: 2 days     Primary Rehab Diagnosis: Impaired Mobility and ADLs secondary to:  1. Primary osteoarthritis of the left hip  2. S/P Left total hip replacement (2/6/2017 - Dr. Franik Cintron)      Subjective:     Patient seen and examined. Blood pressure controlled. Blood sugars uncontrolled. Patient's Complaint:   No significant medical complaints    Pain Control: stable, mild-to-moderate joint symptoms intermittently, reasonably well controlled by current meds      Objective:     Vital Signs:  Patient Vitals for the past 24 hrs:   BP Temp Pulse Resp SpO2   02/10/17 0803 130/75 97.9 °F (36.6 °C) 80 24 90 %   02/09/17 2302 145/84 - 85 18 91 %   02/09/17 2120 133/75 97.7 °F (36.5 °C) 87 18 92 %   02/09/17 1622 127/71 98.8 °F (37.1 °C) 89 18 93 %        Physical Exam:  GENERAL SURVEY: Patient is awake, alert, oriented x 3, sitting comfortably on the chair, not in acute respiratory distress. HEENT: pale palpebral conjunctivae, anicteric sclerae, no nasoaural discharge, moist oral mucosa  NECK: supple, no jugular venous distention, no palpable lymph nodes  CHEST/LUNGS: symmetrical chest expansion, good air entry, clear breath sounds  HEART: adynamic precordium, good S1 S2, no S3, regular rhythm, no murmurs  ABDOMEN: obese, bowel sounds appreciated, soft, non-tender  EXTREMITIES: pale nailbeds, no edema, full and equal pulses, no calf tenderness   NEUROLOGICAL EXAM: The patient is awake, alert and oriented x3, able to answer questions fairly appropriately, able to follow 1 and 2 step commands. Able to tell time from the wall clock. Cranial nerves II-XII are grossly intact. No gross sensory deficit.  Motor strength is 4-/5 on BUE, 4-/5 on the RLE, 1/5 on the left hip, 3/5 on the left knee, 3+/5 on the left ankle.     Incision(s): covered, clean, dry, and intact       Current Medications:  Current Facility-Administered Medications   Medication Dose Route Frequency    ferrous sulfate tablet 325 mg  1 Tab Oral DAILY WITH BREAKFAST    ascorbic acid (vitamin C) (VITAMIN C) tablet 250 mg  250 mg Oral DAILY WITH BREAKFAST    [START ON 2/13/2017] theophylline ER(12 hour) (THEOCHRON) tablet 200 mg  200 mg Oral Q12H    cholecalciferol (VITAMIN D3) tablet 2,000 Units  2,000 Units Oral DAILY    busPIRone (BUSPAR) tablet 20 mg  20 mg Oral TID    melatonin tablet 3 mg  3 mg Oral QHS    modafinil (PROVIGIL) tablet 100 mg  100 mg Oral DAILY    budesonide-formoterol (SYMBICORT) 160-4.5 mcg/actuation HFA inhaler 2 Puff  2 Puff Inhalation BID RT    perphenazine (TRILAFON) tablet tab 2 mg  2 mg Oral QHS    acetaminophen (TYLENOL) tablet 650 mg  650 mg Oral Q4H PRN    docusate sodium (COLACE) capsule 100 mg  100 mg Oral BID    bisacodyl (DULCOLAX) tablet 10 mg  10 mg Oral Q48H PRN    enoxaparin (LOVENOX) injection 40 mg  40 mg SubCUTAneous Q24H    insulin lispro (HUMALOG) injection   SubCUTAneous TIDAC    albuterol (PROVENTIL VENTOLIN) nebulizer solution 2.5 mg  2.5 mg Nebulization Q4H PRN    allopurinol (ZYLOPRIM) tablet 300 mg  300 mg Oral DAILY    amLODIPine (NORVASC) tablet 5 mg  5 mg Oral DAILY    celecoxib (CELEBREX) capsule 100 mg  100 mg Oral BID WITH MEALS    loratadine (CLARITIN) tablet 10 mg  10 mg Oral DAILY    famotidine (PEPCID) tablet 20 mg  20 mg Oral BID    sertraline (ZOLOFT) tablet 100 mg  100 mg Oral DAILY    simvastatin (ZOCOR) tablet 20 mg  20 mg Oral QHS    oxyCODONE-acetaminophen (PERCOCET 7.5) 7.5-325 mg per tablet 1-2 Tab  1-2 Tab Oral Q4H PRN    glucose chewable tablet 16 g  4 Tab Oral PRN    glucagon (GLUCAGEN) injection 1 mg  1 mg IntraMUSCular PRN    dextrose (D50W) injection syrg 12.5-25 g  25-50 mL IntraVENous PRN    fentaNYL (DURAGESIC) 50 mcg/hr patch 1 Patch  1 Patch TransDERmal Q72H    therapeutic multivitamin (THERAGRAN) tablet 1 Tab  1 Tab Oral DAILY    nitroglycerin (NITROSTAT) tablet 0.4 mg  0.4 mg SubLINGual PRN    diphenhydrAMINE (BENADRYL) capsule 25 mg  25 mg Oral Q6H PRN    metFORMIN (GLUCOPHAGE) tablet 500 mg  500 mg Oral BID WITH MEALS       Allergies:  No Known Allergies    Lab/Data Review:  Recent Results (from the past 24 hour(s))   GLUCOSE, POC    Collection Time: 02/09/17  4:21 PM   Result Value Ref Range    Glucose (POC) 146 (H) 70 - 110 mg/dL   GLUCOSE, POC    Collection Time: 02/09/17  9:58 PM   Result Value Ref Range    Glucose (POC) 177 (H) 70 - 110 mg/dL   GLUCOSE, POC    Collection Time: 02/09/17 11:23 PM   Result Value Ref Range    Glucose (POC) 131 (H) 70 - 110 mg/dL   GLUCOSE, POC    Collection Time: 02/10/17  8:43 AM   Result Value Ref Range    Glucose (POC) 138 (H) 70 - 110 mg/dL   GLUCOSE, POC    Collection Time: 02/10/17 11:45 AM   Result Value Ref Range    Glucose (POC) 303 (H) 70 - 110 mg/dL   GLUCOSE, POC    Collection Time: 02/10/17 12:05 PM   Result Value Ref Range    Glucose (POC) 306 (H) 70 - 110 mg/dL       Creatinine Clearance (Cockcroft Gault): On admission, estimated creatinine clearance was 81.8 ml/min (based on Cr of 0.84). Most recent estimated creatinine clearance is 81.8 mL/min (based on Cr of 0.84). Assessment:     Primary Rehab Diagnosis  1. Impaired Mobility and ADLs  2. Primary osteoarthritis of the left hip  3.  S/P Left total hip replacement (2/6/2017 - Dr. Idelia Castleman)     Comorbidities   Bronchial asthma    Type 2 diabetes mellitus without complication    Essential hypertension    Depression    Dyslipidemia    Decreased calculated glomerular filtration rate (GFR)    Aftercare following left hip joint replacement surgery    Acute blood loss as cause of postoperative anemia    Gout    Allergic rhinitis    Cataract of left eye    Incisional hernia  Lichen simplex chronicus    Paronychia    Chronic alcoholism    Tobacco abuse    Chronic pain syndrome    Obesity, Class III, BMI 40-49.9 (morbid obesity)     Anxiety disorder    Encounter for monitoring of theophylline therapy    Prerenal azotemia       Plan:     1. Justification for continued stay: Good progression towards established rehabilitation goals. 2. Medical Issues being followed closely:    [x]  Fall and safety precautions     [x]  Wound Care     [x]  Bowel and Bladder Function     [x]  Fluid Electrolyte and Nutrition Balance     [x]  Pain Control      3. Issues that 24 hour rehabilitation nursing is following:    [x]  Fall and safety precautions     [x]  Wound Care     [x]  Bowel and Bladder Function     [x]  Fluid Electrolyte and Nutrition Balance     [x]  Pain Control      [x]  Assistance with and education on in-room safety with transfers to and from the bed, wheelchair, toilet and shower. 4. Acute rehabilitation plan of care:    [x]  Continue current care and rehab. [x]  Physical Therapy           [x]  Occupational Therapy           []  Speech Therapy     []  Hold Rehab until further notice     5. Medications:    [x]  MAR Reviewed     [x]  Continue Present Medications     6. DVT Prophylaxis:      [x]  Lovenox     []  Unfractionated Heparin     []  Coumadin     []  Xarelto     [x]  RUIZ Stockings     []  Sequential Compression Device     []  None     7.  Orders:   > Primary osteoarthritis of the left hip; S/P Left total hip replacement (2/6/2017 - Dr. Hope Gutierrez)   > Weightbearing as tolerated on the left lower extremity   > Total hip precautions on the left hip    > Staples to be removed on 2/20/2017     > Acute Postoperative Blood Loss Anemia   > Hgb/Hct (2/09/2017, on admission) = 9.9/29.6   > Anemia work-up showed serum iron 16, TIBC 210, iron % saturation 8, ferritin 215, reticulocyte count 1.3   > Patient was started on FeSO4 and Ascorbic acid   > Continue:    > FeSO4 325 mg PO once daily with breakfast     > Ascorbic Acid 250 mg PO once daily with breakfast (to enhance the absorption of the FeSO4)     > Anxiety / Depression   > Patient was [facility] from [facility] on:    > Diazepam 10 mg PO q 6 hr PRN for anxiety    > Buspirone 30 mg PO daily    > Perphenazine 4 mg PO BID    > Sertraline 100 mg PO once daily   > Confirmed psychiatric medications from [address] [place] [address] as follows:    > Diazepam 10 mg PO TID PRN    > Buspirone 30 mg PO 4 times daily    > Perphenazine 2 mg PO q HS    > Sertraline 100 mg PO BID   > On 2/10/2017:    > Held Diazepam 10 mg PO TID PRN    > Changed Buspirone from 30 mg PO daily to 20 mg PO TID    > Decreased Perphenazine from 4 mg PO BID to 2 mg PO q HS    > Added:     > Modafinil 100 mg PO q 8AM     > Melatonin 3 mg PO q HS    > Continue:     > Buspirone 20 mg PO TID     > Perphenazine 2 mg PO q HS     > Sertraline 100 mg PO once daily     > Modafinil 100 mg PO q 8AM     > Melatonin 3 mg PO q HS    > Bronchial asthma   > On 2/09/2017, discontinued:    > Arformoterol nebulization BID    > Budesonide nebulization BID    > Theophylline level (2/09/2017) = 21.0 (N.V. = 5-15)    > Hold Theophylline  mg PO q 12 hr    > On Monday (2/13/2017), resume Theophylline ER at a decreased dose of 200 mg PO q 12 hr   > Start Symbicort 160-4.5 2 puffs inhaled BID   > Continue Albuterol nebulization q 4 hr PRN for shortness of breath     > Essential hypertension   > Continue Amlodipine 5 mg PO once daily (9AM)     > Type 2 diabetes mellitus   > Add Lantus 10 units SC q HS   > Continue:    > Increase Metformin from 500 mg to 1,000 mg PO BID with meals    > Humalog insulin sliding scale SC TID AC only     > Prerenal azotemia    > BUN/Creatinine ratio (2/09/2017) = 30 (BUN 25, Creatinine 0.84)    > Increase oral fluid intake     > Chronic pain syndrome   > Continue:    > Acetaminophen 650 mg PO q 4 hr PRN for pain level less than 5/10    > Celecoxib 100 mg PO BID PC    > Fentanyl 50 mcg/hr patch, 1 patch on skin q 72 hr     > Percocet 7.5/325 1-2 tabs PO q 4 hr PRN for breakthrough pain level greater than 4/10 (from 8PM to 4AM only)       > Vitamin D 25-Hydroxy (2/9/2017) = 30.2   > Patient was started on Cholecalciferol 2,000 units PO once daily       8. Patient's progress in rehabilitation and medical issues discussed with the patient. All questions answered to the best of my ability. Care plan discussed with patient/family and nurse.       Signed:    Lizandro Sanders MD    February 10, 2017

## 2017-02-10 NOTE — PROGRESS NOTES
[x] Psychology  [] Social Work [] Recreational Therapy    INTERVENTION  UNITS/TIME OF SERVICE   Assessment February 9, 2017   Supportive Counseling    Orientation    Discharge Planning    Resource Linkage              Progress/Current Status    Patient seen for Psychological Evaluation as requested on admission to ARU by Dr. Luis Donovan. Patient observed lying supine in bed with head elevated; her son is nearby but quickly leaves the room. Patient is cooperative through interview and able to describe her medical circumstances with detail. She acknowledges having history of psychiatric services and treatment with Dr. Teena Aguilar in [place], having been diagnosed with Schizoaffective Disorder, already receiving [address] [place] [address] on account of same, and currently Rx Trilafon and Zoloft for mood and thought stability. Presently, patient describes feelings of anxiety and even anger about her circumstance but no acute distress nor lability is observed at time of interview. However, of course, she will be monitored for mood and/or behavioral difficulties while on ARU.      Susana Riley, THE [facility] 2/10/2017 8:41 AM

## 2017-02-10 NOTE — ROUTINE PROCESS
Patient critical theophylline level called by Marie Oneil from lab 21.0. Dr Luis Donovan informed morning dose not given.

## 2017-02-10 NOTE — PROGRESS NOTES
02/09/17 2302   Vital Signs   Pulse (Heart Rate) 85   Resp Rate 18   O2 Sat (%) 91 %   Level of Consciousness Alert   /84   MAP (Calculated) 104   BP 1 Method Automatic   BP 1 Location Left arm   BP Patient Position At rest     2030- Pt was attempting to transfer to the wheelchair in order to go to the bathroom. Aleksandra Butler was assisting pt with her transfer. Pt states that she was attempting to pivot to the wheelchair and slid down to the floor. Pt was assisted back into the bed by Mare President, Jarocho Olson CNA and Arnulfo Winston RN. VS stable, . Hip incision intact with no drainage noted. On call MD paged and assessed pt with no new orders at this time. Nursing supervisor notified. Claudetta Bender notified per pt request. Pt resting in room. Will continue to monitor closely.

## 2017-02-10 NOTE — PROGRESS NOTES
attended the interdisciplinary rounds for Wilmer, who is a 64 y.o.,female. Patients Primary Language is: [place]. According to the patients EMR Congregation Affiliation is: [employer]. The reason the Patient came to the hospital is:   Patient Active Problem List    Diagnosis Date Noted    Encounter for monitoring of theophylline therapy 02/09/2017    Prerenal azotemia 02/09/2017    Gout     Allergic rhinitis     Cataract of left eye     Incisional hernia     Lichen simplex chronicus     Paronychia     Chronic alcoholism ([address]     History of tobacco abuse     Chronic pain syndrome     Obesity, Class III, BMI 40-49.9 (morbid obesity) (HCC)     Anxiety disorder     Acute blood loss as cause of postoperative anemia 02/07/2017    Decreased calculated glomerular filtration rate (GFR) 02/06/2017    Status post total replacement of left hip 02/06/2017    Aftercare following left hip joint replacement surgery 02/06/2017    Primary osteoarthritis of left hip     Bronchial asthma     Type 2 diabetes mellitus without complication ([facility]     Essential hypertension     Depression     Dyslipidemia           Plan:  Chaplains will continue to follow and will provide pastoral care on an as needed/requested basis.  recommends bedside caregivers page  on duty if patient shows signs of acute spiritual or emotional distress.       [facility]  [phone]

## 2017-02-11 LAB
GLUCOSE BLD STRIP.AUTO-MCNC: 112 MG/DL (ref 70–110)
GLUCOSE BLD STRIP.AUTO-MCNC: 139 MG/DL (ref 70–110)
GLUCOSE BLD STRIP.AUTO-MCNC: 155 MG/DL (ref 70–110)
GLUCOSE BLD STRIP.AUTO-MCNC: 223 MG/DL (ref 70–110)

## 2017-02-11 PROCEDURE — 82962 GLUCOSE BLOOD TEST: CPT

## 2017-02-11 PROCEDURE — 74011250636 HC RX REV CODE- 250/636: Performed by: INTERNAL MEDICINE

## 2017-02-11 PROCEDURE — 65310000000 HC RM PRIVATE REHAB

## 2017-02-11 PROCEDURE — 74011636637 HC RX REV CODE- 636/637: Performed by: INTERNAL MEDICINE

## 2017-02-11 PROCEDURE — 74011250637 HC RX REV CODE- 250/637: Performed by: INTERNAL MEDICINE

## 2017-02-11 RX ADMIN — OXYCODONE HYDROCHLORIDE AND ACETAMINOPHEN 2 TABLET: 7.5; 325 TABLET ORAL at 21:05

## 2017-02-11 RX ADMIN — ALBUTEROL SULFATE 2 PUFF: 90 AEROSOL, METERED RESPIRATORY (INHALATION) at 09:03

## 2017-02-11 RX ADMIN — Medication 250 MG: at 09:02

## 2017-02-11 RX ADMIN — PERPHENAZINE 2 MG: 2 TABLET, FILM COATED ORAL at 21:05

## 2017-02-11 RX ADMIN — Medication 325 MG: at 09:02

## 2017-02-11 RX ADMIN — INSULIN GLARGINE 10 UNITS: 100 INJECTION, SOLUTION SUBCUTANEOUS at 21:10

## 2017-02-11 RX ADMIN — VITAMIN D, TAB 1000IU (100/BT) 2000 UNITS: 25 TAB at 09:02

## 2017-02-11 RX ADMIN — THERA TABS 1 TABLET: TAB at 09:02

## 2017-02-11 RX ADMIN — CELECOXIB 100 MG: 100 CAPSULE ORAL at 09:02

## 2017-02-11 RX ADMIN — ALBUTEROL SULFATE 2 PUFF: 90 AEROSOL, METERED RESPIRATORY (INHALATION) at 21:06

## 2017-02-11 RX ADMIN — FAMOTIDINE 20 MG: 20 TABLET ORAL at 08:59

## 2017-02-11 RX ADMIN — METFORMIN HYDROCHLORIDE 1000 MG: 500 TABLET, FILM COATED ORAL at 09:02

## 2017-02-11 RX ADMIN — SERTRALINE HYDROCHLORIDE 100 MG: 50 TABLET ORAL at 08:59

## 2017-02-11 RX ADMIN — BUSPIRONE HYDROCHLORIDE 20 MG: 5 TABLET ORAL at 18:13

## 2017-02-11 RX ADMIN — METFORMIN HYDROCHLORIDE 1000 MG: 500 TABLET, FILM COATED ORAL at 18:13

## 2017-02-11 RX ADMIN — BUDESONIDE AND FORMOTEROL FUMARATE DIHYDRATE 2 PUFF: 160; 4.5 AEROSOL RESPIRATORY (INHALATION) at 21:08

## 2017-02-11 RX ADMIN — BUSPIRONE HYDROCHLORIDE 20 MG: 5 TABLET ORAL at 09:01

## 2017-02-11 RX ADMIN — CELECOXIB 100 MG: 100 CAPSULE ORAL at 18:14

## 2017-02-11 RX ADMIN — ENOXAPARIN SODIUM 40 MG: 100 INJECTION SUBCUTANEOUS at 06:19

## 2017-02-11 RX ADMIN — INSULIN LISPRO 4 UNITS: 100 INJECTION, SOLUTION INTRAVENOUS; SUBCUTANEOUS at 13:48

## 2017-02-11 RX ADMIN — LORATADINE 10 MG: 10 TABLET ORAL at 09:01

## 2017-02-11 RX ADMIN — MODAFINIL 100 MG: 100 TABLET ORAL at 09:02

## 2017-02-11 RX ADMIN — BUSPIRONE HYDROCHLORIDE 20 MG: 5 TABLET ORAL at 21:05

## 2017-02-11 RX ADMIN — OXYCODONE HYDROCHLORIDE AND ACETAMINOPHEN 1 TABLET: 7.5; 325 TABLET ORAL at 09:23

## 2017-02-11 RX ADMIN — OXYCODONE HYDROCHLORIDE AND ACETAMINOPHEN 2 TABLET: 7.5; 325 TABLET ORAL at 14:54

## 2017-02-11 RX ADMIN — SIMVASTATIN 20 MG: 20 TABLET, FILM COATED ORAL at 21:05

## 2017-02-11 RX ADMIN — DOCUSATE SODIUM 100 MG: 100 CAPSULE, LIQUID FILLED ORAL at 18:14

## 2017-02-11 RX ADMIN — ALLOPURINOL 300 MG: 100 TABLET ORAL at 08:58

## 2017-02-11 RX ADMIN — DOCUSATE SODIUM 100 MG: 100 CAPSULE, LIQUID FILLED ORAL at 09:02

## 2017-02-11 RX ADMIN — AMLODIPINE BESYLATE 5 MG: 5 TABLET ORAL at 08:59

## 2017-02-11 RX ADMIN — MELATONIN TAB 3 MG 3 MG: 3 TAB at 21:05

## 2017-02-11 RX ADMIN — FAMOTIDINE 20 MG: 20 TABLET ORAL at 18:14

## 2017-02-11 NOTE — PROGRESS NOTES
Problem: Mobility Impaired (Adult and Pediatric)  Goal: *Acute Goals and Plan of Care (Insert Text)  Physical Therapy Short Term Goals  Initiated 2/9/2017 and to be accomplished within 7 day(s) (02/16/2017)  1. Patient will move from supine to sit and sit to supine , scoot up and down and roll side to side in bed with moderate assistance . 2. Patient will transfer from bed to chair and chair to bed with moderate assistance using the least restrictive device and safe technique. 3. Patient will perform sit to stand with minimal assistance/contact guard assist.  4. Patient will ambulate with maximal assistance for 15 feet with the least restrictive device. Physical Therapy Long Term Goals  Initiated 2/9/2017 and to be accomplished within 21 day(s) (03/02/2017)  1. Patient will move from supine to sit and sit to supine , scoot up and down and roll side to side in bed with minimal assistance/contact guard assist.   2. Patient will transfer from bed to chair and chair to bed with minimal assistance/contact guard assist using the least restrictive device. 3. Patient will perform sit to stand with supervision/set-up. 4. Patient will ambulate with moderate assistance for 50 feet with the least restrictive device. 5. Patient will ascend/descend 4 stairs with 2 handrail(s) with moderate assistance . PT Attempt Note     Pt declined scheduled PT treatment 2/2 family visiting. Educated pt and family on importance of participation. However pt continued to decline.   Pt missed 4 units of PT intervention due to refusal.     Hansel Boyer, PT, DPT

## 2017-02-11 NOTE — ROUTINE PROCESS
SHIFT CHANGE NOTE FOR [facility]    Bedside and Verbal shift change report given to [address] [place] (oncoming nurse) by Luana Guzman LPN   (offgoing nurse). Report included the following information SBAR, Kardex, MAR and Recent Results.     Situation:   Code Status: Full Code   Reason for Admission: left hip replacement  Hospital Day: 3   Problem List:   Hospital Problems  Date Reviewed: 2/10/2017          Codes Class Noted POA    Encounter for monitoring of theophylline therapy ICD-10-CM: Z51.81, Z79.899  ICD-9-CM: V58.83, V58.69  2/9/2017 Yes    Overview Signed 2/9/2017 12:35 PM by Rukhsana Rock MD     Theophylline level (2/09/2017) = 21.0             Prerenal azotemia ICD-10-CM: R79.89  ICD-9-CM: 790.6  2/9/2017 Yes    Overview Signed 2/9/2017 12:35 PM by Rukhsana Rock MD     BUN/Creatinine ratio (2/09/2017) = 30 (BUN 25, Creatinine 0.84)              Chronic pain syndrome (Chronic) ICD-10-CM: G89.4  ICD-9-CM: 338.4  Unknown Yes        Acute blood loss as cause of postoperative anemia ICD-10-CM: D62  ICD-9-CM: 285.1  2/7/2017 Yes        Primary osteoarthritis of left hip (Chronic) ICD-10-CM: M16.12  ICD-9-CM: 715.15  Unknown Yes        Type 2 diabetes mellitus without complication (HCC) (Chronic) ICD-10-CM: E11.9  ICD-9-CM: 250.00  Unknown Yes        Essential hypertension (Chronic) ICD-10-CM: I10  ICD-9-CM: 401.9  Unknown Yes        Decreased calculated glomerular filtration rate (GFR) ICD-10-CM: R94.4  ICD-9-CM: 794.4  2/6/2017 Yes    Overview Signed 2/8/2017  5:12 PM by Rukhsana Rock MD     Calculated GFR is equivalent to that of CKD stage 2 = 60-89 ml/min             * (Principal)Status post total replacement of left hip ICD-10-CM: N21.972  ICD-9-CM: V43.64  2/6/2017 Yes    Overview Signed 2/8/2017  5:13 PM by MD Dr. Cheryl Lyons             Aftercare following left hip joint replacement surgery ICD-10-CM: Z47.1, E28.160  ICD-9-CM: V54.81, V43.64  2/6/2017 Yes    Overview Signed 2/8/2017 5:13 PM by Melissa Donaldson MD     S/P Left total hip replacement (2/6/2017 - Dr. Rossy Alvarado)                   Background:   Past Medical History:   Past Medical History   Diagnosis Date    Acute blood loss as cause of postoperative anemia 2/7/2017    Allergic rhinitis     Anxiety disorder     Bronchial asthma     Cataract of left eye     Chronic [facility]ism ([facility] [facility]     Chronic pain syndrome     Decreased calculated glomerular filtration rate (GFR) 2/6/2017     Calculated GFR is equivalent to that of CKD stage 2 = 60-89 ml/min    Depression     Dyslipidemia     Essential hypertension     Gout     History of tobacco abuse     Incisional hernia     Lichen simplex chronicus     Obesity, Class III, BMI 40-49.9 (morbid obesity) ([facility]     Paronychia     Primary osteoarthritis of left hip     Type 2 diabetes mellitus without complication ([facility]       Patient taking anticoagulants yes    Patient has a defibrillator: no     Assessment:   Changes in Assessment throughout shift: No     Patient has central line: no Reasons if yes: Insertion date: Last dressing date:   Patient has Villatoro Cath: no Reasons if yes:     Insertion date:     Last Vitals:     Vitals:    02/10/17 1642 02/10/17 2217 02/11/17 0859 02/11/17 1600   BP: 154/85 135/75 139/72 142/74   Pulse: 88 90 83 84   Resp: 23 16 19 23   Temp: 97.6 °F (36.4 °C) 97.9 °F (36.6 °C) 97.4 °F (36.3 °C) 98.2 °F (36.8 °C)   SpO2: 97% 99% 91% 94%   Weight:       Height:            PAIN    Pain Assessment    Pain Intensity 1: 3 (02/11/17 1600) Pain Intensity 1: 2 (12/29/14 1105)    Pain Location 1: Leg Pain Location 1: Abdomen    Pain Intervention(s) 1: Medication (see MAR) Pain Intervention(s) 1: Medication (see MAR)  Patient Stated Pain Goal: 0 Patient Stated Pain Goal: 0  o Intervention effective: yes    o Other actions taken for pain: No     Skin Assessment  Skin color Skin Color: Appropriate for ethnicity  Condition/Temperature Skin Condition/Temp: Dry, Warm  Integrity Skin Integrity: Incision (comment)  Turgor Turgor: Non-tenting  Weekly Pressure Ulcer Documentation Weekly Pressure Ulcer Documentation: No Pressure Ulcer Noted-Pressure Ulcer Prevention Initiated  Wound Prevention & Protection Methods  Orientation of wound Orientation of Wound Prevention: Posterior  Location of Prevention Location of Wound Prevention: Buttocks, Sacrum/Coccyx  Dressing Present Dressing Present : No  Dressing Status    Wound Offloading Wound Offloading (Prevention Methods): Bed, pressure redistribution/air     INTAKE/OUPUT    Date 02/10/17 0700 - 02/11/17 0659 02/11/17 0700 - 02/12/17 0659   Shift 0700-1859 1900-0659 24 Hour Total 0700-1859 1900-0659 24 Hour Total   I  N  T  A  K  E   Shift Total  (mL/kg)         O  U  T  P  U  T   Urine  (mL/kg/hr)            Urine Occurrence(s) 1 x 2 x 3 x       Stool            Stool Occurrence(s) 0 x 0 x 0 x       Shift Total  (mL/kg)         NET         Weight (kg) 105.7 105.7 105.7 105.7 105.7 105.7       Recommendations:  1. Patient needs and requests: education    2. Diet: diabetic    3. Pending tests/procedures: No     4. Functional Level/Equipment: 1 x person assist    5. Estimated Discharge Date: TDB Posted on Whiteboard in Patients Room: no     [facility] Safety Check    A safety check occurred in the patient's room between off going nurse and oncoming nurse listed above.     The safety check included the below items  Area Items   H  High Alert Medications - Verify all high alert medication drips (heparin, PCA, etc.)   E  Equipment - Suction is set up for ALL patients (with yanker)  - Red plugs utilized for all equipment (IV pumps, etc.)  - WOWs wiped down at end of shift.  - Room stocked with oxygen, suction, and other unit-specific supplies   A  Alarms - Bed alarm is set for fall risk patients  - Ensure chair alarm is in place and activated if patient is up in a chair   L  Lines - Check IV for any infiltration  - Villatoro bag is empty if patient has a Villatoro   - Tubing and IV bags are labeled   S  Safety   - Room is clean, patient is clean, and equipment is clean. - Hallways are clear from equipment besides carts. - Fall bracelet on for fall risk patients  - Ensure room is clear and free of clutter  - Suction is set up for ALL patients (with yanker)  - Hallways are clear from equipment besides carts.    - Isolation precautions followed, supplies available outside room, sign posted

## 2017-02-11 NOTE — ROUTINE PROCESS
SHIFT CHANGE NOTE FOR [facility]    Bedside and Verbal shift change report given to Orion Ferguson LPN (oncoming nurse) by Ryan Colby RN   (offgoing nurse). Report included the following information SBAR, Kardex, MAR and Recent Results.     Situation:   Code Status: Full Code   Reason for Admission: left hip replacement  Hospital Day: 3   Problem List:   Hospital Problems  Date Reviewed: 2/10/2017          Codes Class Noted POA    Encounter for monitoring of theophylline therapy ICD-10-CM: Z51.81, Z79.899  ICD-9-CM: V58.83, V58.69  2/9/2017 Yes    Overview Signed 2/9/2017 12:35 PM by Jhoan Perez MD     Theophylline level (2/09/2017) = 21.0             Prerenal azotemia ICD-10-CM: R79.89  ICD-9-CM: 790.6  2/9/2017 Yes    Overview Signed 2/9/2017 12:35 PM by Jhoan Perez MD     BUN/Creatinine ratio (2/09/2017) = 30 (BUN 25, Creatinine 0.84)              Chronic pain syndrome (Chronic) ICD-10-CM: G89.4  ICD-9-CM: 338.4  Unknown Yes        Acute blood loss as cause of postoperative anemia ICD-10-CM: D62  ICD-9-CM: 285.1  2/7/2017 Yes        Primary osteoarthritis of left hip (Chronic) ICD-10-CM: M16.12  ICD-9-CM: 715.15  Unknown Yes        Type 2 diabetes mellitus without complication (HCC) (Chronic) ICD-10-CM: E11.9  ICD-9-CM: 250.00  Unknown Yes        Essential hypertension (Chronic) ICD-10-CM: I10  ICD-9-CM: 401.9  Unknown Yes        Decreased calculated glomerular filtration rate (GFR) ICD-10-CM: R94.4  ICD-9-CM: 794.4  2/6/2017 Yes    Overview Signed 2/8/2017  5:12 PM by Jhoan Perez MD     Calculated GFR is equivalent to that of CKD stage 2 = 60-89 ml/min             * (Principal)Status post total replacement of left hip ICD-10-CM: O95.133  ICD-9-CM: V43.64  2/6/2017 Yes    Overview Signed 2/8/2017  5:13 PM by Deyanne Childes, MD Dr. Coralee Osgood             Aftercare following left hip joint replacement surgery ICD-10-CM: Z47.1, F19.043  ICD-9-CM: V54.81, V43.64  2/6/2017 Yes    Overview Signed 2/8/2017  5:13 PM by Alanna Arcos MD     S/P Left total hip replacement (2/6/2017 - Dr. Rick Murray)                   Background:   Past Medical History:   Past Medical History   Diagnosis Date    Acute blood loss as cause of postoperative anemia 2/7/2017    Allergic rhinitis     Anxiety disorder     Bronchial asthma     Cataract of left eye     [facility] ([facility] [address]     Chronic pain syndrome     Decreased calculated glomerular filtration rate (GFR) 2/6/2017     Calculated GFR is equivalent to that of CKD stage 2 = 60-89 ml/min    Depression     Dyslipidemia     Essential hypertension     Gout     History of tobacco abuse     Incisional hernia     Lichen simplex chronicus     Obesity, Class III, BMI 40-49.9 (morbid obesity) ([facility] [address]     Paronychia     Primary osteoarthritis of left hip     Type 2 diabetes mellitus without complication ([facility] [address]       Patient taking anticoagulants yes    Patient has a defibrillator: no     Assessment:   Changes in Assessment throughout shift: No     Patient has central line: no Reasons if yes: Insertion date: Last dressing date:   Patient has Villatoro Cath: no Reasons if yes:     Insertion date:     Last Vitals:     Vitals:    02/09/17 2302 02/10/17 0803 02/10/17 1642 02/10/17 2217   BP: 145/84 130/75 154/85 135/75   Pulse: 85 80 88 90   Resp: 18 24 23 16   Temp:  97.9 °F (36.6 °C) 97.6 °F (36.4 °C) 97.9 °F (36.6 °C)   SpO2: 91% 90% 97% 99%   Weight:       Height:            PAIN    Pain Assessment    Pain Intensity 1: 0 (02/11/17 0002) Pain Intensity 1: 2 (12/29/14 1105)    Pain Location 1: Leg Pain Location 1: Abdomen    Pain Intervention(s) 1: Medication (see MAR) Pain Intervention(s) 1: Medication (see MAR)  Patient Stated Pain Goal: 0 Patient Stated Pain Goal: 0  o Intervention effective: yes    o Other actions taken for pain: No     Skin Assessment  Skin color Skin Color: Appropriate for ethnicity  Condition/Temperature Skin Condition/Temp: Dry, Warm  Integrity Skin Integrity: Incision (comment)  Turgor Turgor: Non-tenting  Weekly Pressure Ulcer Documentation Weekly Pressure Ulcer Documentation: No Pressure Ulcer Noted-Pressure Ulcer Prevention Initiated  Wound Prevention & Protection Methods  Orientation of wound Orientation of Wound Prevention: Posterior  Location of Prevention Location of Wound Prevention: Sacrum/Coccyx  Dressing Present Dressing Present : No  Dressing Status    Wound Offloading Wound Offloading (Prevention Methods): Bed, pressure redistribution/air     INTAKE/OUPUT    Date 02/10/17 0700 - 02/11/17 0659 02/11/17 0700 - 02/12/17 0659   Shift 0700-1859 1900-0659 24 Hour Total 0700-1859 1900-0659 24 Hour Total   I  N  T  A  K  E   Shift Total  (mL/kg)         O  U  T  P  U  T   Urine  (mL/kg/hr)            Urine Occurrence(s) 1 x 1 x 2 x       Stool            Stool Occurrence(s) 0 x 0 x 0 x       Shift Total  (mL/kg)         NET         Weight (kg) 105.7 105.7 105.7 105.7 105.7 105.7       Recommendations:  1. Patient needs and requests: education    2. Diet: diabetic    3. Pending tests/procedures: No     4. Functional Level/Equipment: 1 x person assist    5. Estimated Discharge Date: TDB Posted on Whiteboard in Patients Room: no     [facility] Safety Check    A safety check occurred in the patient's room between off going nurse and oncoming nurse listed above.     The safety check included the below items  Area Items   H  High Alert Medications - Verify all high alert medication drips (heparin, PCA, etc.)   E  Equipment - Suction is set up for ALL patients (with yanker)  - Red plugs utilized for all equipment (IV pumps, etc.)  - WOWs wiped down at end of shift.  - Room stocked with oxygen, suction, and other unit-specific supplies   A  Alarms - Bed alarm is set for fall risk patients  - Ensure chair alarm is in place and activated if patient is up in a chair   L  Lines - Check IV for any infiltration  - Villatoro bag is empty if patient has a Vlilatoro   - Tubing and IV bags are labeled   S  Safety   - Room is clean, patient is clean, and equipment is clean. - Hallways are clear from equipment besides carts. - Fall bracelet on for fall risk patients  - Ensure room is clear and free of clutter  - Suction is set up for ALL patients (with momoker)  - Hallways are clear from equipment besides carts.    - Isolation precautions followed, supplies available outside room, sign posted

## 2017-02-11 NOTE — PROGRESS NOTES
Progress Note    Patient: Jose Colindres MRN: [id number]  SSN: [id number]    YOB: 1955  Age: 64 y.o. Sex: female      Admit Date: 2/8/2017    LOS: 3 days     Subjective:     Patient with diabetes s/p left hip replacement. No acute problems. Objective:     Vitals:    02/10/17 0803 02/10/17 1642 02/10/17 2217 02/11/17 0859   BP: 130/75 154/85 135/75 139/72   Pulse: 80 88 90 83   Resp: 24 23 16 19   Temp: 97.9 °F (36.6 °C) 97.6 °F (36.4 °C) 97.9 °F (36.6 °C) 97.4 °F (36.3 °C)   SpO2: 90% 97% 99% 91%   Weight:       Height:            Intake and Output:  Current Shift:    Last three shifts:      Physical Exam:   GENERAL: alert, cooperative, no distress, appears stated age  LUNG: clear to auscultation bilaterally  HEART: regular rate and rhythm, S1, S2 normal, no murmur, click, rub or gallop    Lab/Data Review: All lab results for the last 24 hours reviewed. Glucose 223      Assessment:     Principal Problem:    Status post total replacement of left hip (2/6/2017)      Overview: Dr. Tom Bello    Active Problems:    Primary osteoarthritis of left hip ()      Type 2 diabetes mellitus without complication (HCC) ()      Essential hypertension ()      Decreased calculated glomerular filtration rate (GFR) (2/6/2017)      Overview: Calculated GFR is equivalent to that of CKD stage 2 = 60-89 ml/min      Aftercare following left hip joint replacement surgery (2/6/2017)      Overview: S/P Left total hip replacement (2/6/2017 - Dr. Tom Bello)      Acute blood loss as cause of postoperative anemia (2/7/2017)      Chronic pain syndrome ()      Encounter for monitoring of theophylline therapy (2/9/2017)      Overview: Theophylline level (2/09/2017) = 21.0      Prerenal azotemia (2/9/2017)      Overview: BUN/Creatinine ratio (2/09/2017) = 30 (BUN 25, Creatinine 0.84)         Plan:     Continue present management.     Signed By: Brunilda Alaniz MD     February 11, 2017

## 2017-02-12 LAB
GLUCOSE BLD STRIP.AUTO-MCNC: 115 MG/DL (ref 70–110)
GLUCOSE BLD STRIP.AUTO-MCNC: 128 MG/DL (ref 70–110)
GLUCOSE BLD STRIP.AUTO-MCNC: 209 MG/DL (ref 70–110)
GLUCOSE BLD STRIP.AUTO-MCNC: 215 MG/DL (ref 70–110)

## 2017-02-12 PROCEDURE — 74011250637 HC RX REV CODE- 250/637: Performed by: INTERNAL MEDICINE

## 2017-02-12 PROCEDURE — 65310000000 HC RM PRIVATE REHAB

## 2017-02-12 PROCEDURE — 74011636637 HC RX REV CODE- 636/637: Performed by: INTERNAL MEDICINE

## 2017-02-12 PROCEDURE — 82962 GLUCOSE BLOOD TEST: CPT

## 2017-02-12 PROCEDURE — 97535 SELF CARE MNGMENT TRAINING: CPT

## 2017-02-12 PROCEDURE — 74011250636 HC RX REV CODE- 250/636: Performed by: INTERNAL MEDICINE

## 2017-02-12 PROCEDURE — 97110 THERAPEUTIC EXERCISES: CPT

## 2017-02-12 RX ADMIN — AMLODIPINE BESYLATE 5 MG: 5 TABLET ORAL at 08:42

## 2017-02-12 RX ADMIN — OXYCODONE HYDROCHLORIDE AND ACETAMINOPHEN 2 TABLET: 7.5; 325 TABLET ORAL at 21:54

## 2017-02-12 RX ADMIN — FAMOTIDINE 20 MG: 20 TABLET ORAL at 08:43

## 2017-02-12 RX ADMIN — OXYCODONE HYDROCHLORIDE AND ACETAMINOPHEN 2 TABLET: 7.5; 325 TABLET ORAL at 08:44

## 2017-02-12 RX ADMIN — ALLOPURINOL 300 MG: 100 TABLET ORAL at 08:43

## 2017-02-12 RX ADMIN — BUDESONIDE AND FORMOTEROL FUMARATE DIHYDRATE 2 PUFF: 160; 4.5 AEROSOL RESPIRATORY (INHALATION) at 09:00

## 2017-02-12 RX ADMIN — CELECOXIB 100 MG: 100 CAPSULE ORAL at 16:13

## 2017-02-12 RX ADMIN — INSULIN LISPRO 4 UNITS: 100 INJECTION, SOLUTION INTRAVENOUS; SUBCUTANEOUS at 11:48

## 2017-02-12 RX ADMIN — PERPHENAZINE 2 MG: 2 TABLET, FILM COATED ORAL at 21:54

## 2017-02-12 RX ADMIN — MODAFINIL 100 MG: 100 TABLET ORAL at 08:42

## 2017-02-12 RX ADMIN — METFORMIN HYDROCHLORIDE 1000 MG: 500 TABLET, FILM COATED ORAL at 16:13

## 2017-02-12 RX ADMIN — FAMOTIDINE 20 MG: 20 TABLET ORAL at 18:00

## 2017-02-12 RX ADMIN — Medication 325 MG: at 08:41

## 2017-02-12 RX ADMIN — LORATADINE 10 MG: 10 TABLET ORAL at 08:41

## 2017-02-12 RX ADMIN — ENOXAPARIN SODIUM 40 MG: 100 INJECTION SUBCUTANEOUS at 05:22

## 2017-02-12 RX ADMIN — BUSPIRONE HYDROCHLORIDE 20 MG: 5 TABLET ORAL at 08:42

## 2017-02-12 RX ADMIN — BUSPIRONE HYDROCHLORIDE 20 MG: 5 TABLET ORAL at 21:54

## 2017-02-12 RX ADMIN — OXYCODONE HYDROCHLORIDE AND ACETAMINOPHEN 2 TABLET: 7.5; 325 TABLET ORAL at 17:42

## 2017-02-12 RX ADMIN — VITAMIN D, TAB 1000IU (100/BT) 2000 UNITS: 25 TAB at 08:42

## 2017-02-12 RX ADMIN — CELECOXIB 100 MG: 100 CAPSULE ORAL at 08:43

## 2017-02-12 RX ADMIN — OXYCODONE HYDROCHLORIDE AND ACETAMINOPHEN 2 TABLET: 7.5; 325 TABLET ORAL at 13:00

## 2017-02-12 RX ADMIN — DOCUSATE SODIUM 100 MG: 100 CAPSULE, LIQUID FILLED ORAL at 18:00

## 2017-02-12 RX ADMIN — OXYCODONE HYDROCHLORIDE AND ACETAMINOPHEN 2 TABLET: 7.5; 325 TABLET ORAL at 02:30

## 2017-02-12 RX ADMIN — BUSPIRONE HYDROCHLORIDE 20 MG: 5 TABLET ORAL at 16:13

## 2017-02-12 RX ADMIN — SIMVASTATIN 20 MG: 20 TABLET, FILM COATED ORAL at 21:54

## 2017-02-12 RX ADMIN — METFORMIN HYDROCHLORIDE 1000 MG: 500 TABLET, FILM COATED ORAL at 08:42

## 2017-02-12 RX ADMIN — DOCUSATE SODIUM 100 MG: 100 CAPSULE, LIQUID FILLED ORAL at 08:43

## 2017-02-12 RX ADMIN — SERTRALINE HYDROCHLORIDE 100 MG: 50 TABLET ORAL at 08:41

## 2017-02-12 RX ADMIN — MELATONIN TAB 3 MG 3 MG: 3 TAB at 21:56

## 2017-02-12 RX ADMIN — THERA TABS 1 TABLET: TAB at 08:42

## 2017-02-12 RX ADMIN — INSULIN GLARGINE 10 UNITS: 100 INJECTION, SOLUTION SUBCUTANEOUS at 21:55

## 2017-02-12 RX ADMIN — Medication 250 MG: at 08:43

## 2017-02-12 NOTE — ROUTINE PROCESS
SHIFT CHANGE NOTE FOR [facility]    Bedside and Verbal shift change report given to Sut Reardon LPN (oncoming nurse) by Matthias Joseph RN   (offgoing nurse). Report included the following information SBAR, Kardex, MAR and Recent Results.     Situation:   Code Status: Full Code   Reason for Admission: left hip replacement  Hospital Day: 4   Problem List:   Hospital Problems  Date Reviewed: 2/10/2017          Codes Class Noted POA    Encounter for monitoring of theophylline therapy ICD-10-CM: Z51.81, Z79.899  ICD-9-CM: V58.83, V58.69  2/9/2017 Yes    Overview Signed 2/9/2017 12:35 PM by Jaziel Painting MD     Theophylline level (2/09/2017) = 21.0             Prerenal azotemia ICD-10-CM: R79.89  ICD-9-CM: 790.6  2/9/2017 Yes    Overview Signed 2/9/2017 12:35 PM by Jaziel Painting MD     BUN/Creatinine ratio (2/09/2017) = 30 (BUN 25, Creatinine 0.84)              Chronic pain syndrome (Chronic) ICD-10-CM: G89.4  ICD-9-CM: 338.4  Unknown Yes        Acute blood loss as cause of postoperative anemia ICD-10-CM: D62  ICD-9-CM: 285.1  2/7/2017 Yes        Primary osteoarthritis of left hip (Chronic) ICD-10-CM: M16.12  ICD-9-CM: 715.15  Unknown Yes        Type 2 diabetes mellitus without complication (HCC) (Chronic) ICD-10-CM: E11.9  ICD-9-CM: 250.00  Unknown Yes        Essential hypertension (Chronic) ICD-10-CM: I10  ICD-9-CM: 401.9  Unknown Yes        Decreased calculated glomerular filtration rate (GFR) ICD-10-CM: R94.4  ICD-9-CM: 794.4  2/6/2017 Yes    Overview Signed 2/8/2017  5:12 PM by Jaziel Painting MD     Calculated GFR is equivalent to that of CKD stage 2 = 60-89 ml/min             * (Principal)Status post total replacement of left hip ICD-10-CM: Y52.386  ICD-9-CM: V43.64  2/6/2017 Yes    Overview Signed 2/8/2017  5:13 PM by MD Dr. Barry Amin             Aftercare following left hip joint replacement surgery ICD-10-CM: Z47.1, K18.755  ICD-9-CM: V54.81, V43.64  2/6/2017 Yes    Overview Signed 2/8/2017 5:13 PM by Taisha Jorgensen MD     S/P Left total hip replacement (2/6/2017 - Dr. Idelia Castleman)                   Background:   Past Medical History:   Past Medical History   Diagnosis Date    Acute blood loss as cause of postoperative anemia 2/7/2017    Allergic rhinitis     Anxiety disorder     Bronchial asthma     Cataract of left eye     Chronic alcoholism ([facility] [address]     Chronic pain syndrome     Decreased calculated glomerular filtration rate (GFR) 2/6/2017     Calculated GFR is equivalent to that of CKD stage 2 = 60-89 ml/min    Depression     Dyslipidemia     Essential hypertension     Gout     History of tobacco abuse     Incisional hernia     Lichen simplex chronicus     Obesity, Class III, BMI 40-49.9 (morbid obesity) ([facility] [address]     Paronychia     Primary osteoarthritis of left hip     Type 2 diabetes mellitus without complication ([facility] [address]       Patient taking anticoagulants yes    Patient has a defibrillator: no     Assessment:   Changes in Assessment throughout shift: No     Patient has central line: no Reasons if yes: Insertion date: Last dressing date:   Patient has Villatoro Cath: no Reasons if yes:     Insertion date:     Last Vitals:     Vitals:    02/10/17 2217 02/11/17 0859 02/11/17 1600 02/11/17 1939   BP: 135/75 139/72 142/74 138/69   Pulse: 90 83 84 92   Resp: 16 19 23 17   Temp: 97.9 °F (36.6 °C) 97.4 °F (36.3 °C) 98.2 °F (36.8 °C) 98.3 °F (36.8 °C)   SpO2: 99% 91% 94% 94%   Weight:       Height:            PAIN    Pain Assessment    Pain Intensity 1: 0 (02/12/17 0400) Pain Intensity 1: 2 (12/29/14 1105)    Pain Location 1: Leg Pain Location 1: Abdomen    Pain Intervention(s) 1: Medication (see MAR) Pain Intervention(s) 1: Medication (see MAR)  Patient Stated Pain Goal: 0 Patient Stated Pain Goal: 0  o Intervention effective: yes    o Other actions taken for pain: No     Skin Assessment  Skin color Skin Color: Appropriate for ethnicity  Condition/Temperature Skin Condition/Temp: Dry, Warm  Integrity Skin Integrity: Incision (comment)  Turgor Turgor: Non-tenting  Weekly Pressure Ulcer Documentation Weekly Pressure Ulcer Documentation: No Pressure Ulcer Noted-Pressure Ulcer Prevention Initiated  Wound Prevention & Protection Methods  Orientation of wound Orientation of Wound Prevention: Posterior  Location of Prevention Location of Wound Prevention: Sacrum/Coccyx  Dressing Present Dressing Present : No  Dressing Status    Wound Offloading Wound Offloading (Prevention Methods): Bed, pressure redistribution/air     INTAKE/OUPUT    Date 02/11/17 0700 - 02/12/17 0659 02/12/17 0700 - 02/13/17 0659   Shift 0700-1859 1900-0659 24 Hour Total 0700-1859 1900-0659 24 Hour Total   I  N  T  A  K  E   Shift Total  (mL/kg)         O  U  T  P  U  T   Urine  (mL/kg/hr)            Urine Occurrence(s) 4 x 2 x 6 x       Stool            Stool Occurrence(s) 2 x 0 x 2 x       Shift Total  (mL/kg)         NET         Weight (kg) 105.7 105.7 105.7 105.7 105.7 105.7       Recommendations:  1. Patient needs and requests: education    2. Diet: diabetic    3. Pending tests/procedures: No     4. Functional Level/Equipment: 1 x person assist    5. Estimated Discharge Date: TDB Posted on Whiteboard in Patients Room: no     [facility] [place] Safety Check    A safety check occurred in the patient's room between off going nurse and oncoming nurse listed above.     The safety check included the below items  Area Items   H  High Alert Medications - Verify all high alert medication drips (heparin, PCA, etc.)   E  Equipment - Suction is set up for ALL patients (with yanker)  - Red plugs utilized for all equipment (IV pumps, etc.)  - WOWs wiped down at end of shift.  - Room stocked with oxygen, suction, and other unit-specific supplies   A  Alarms - Bed alarm is set for fall risk patients  - Ensure chair alarm is in place and activated if patient is up in a chair   L  Lines - Check IV for any infiltration  - Villatoro bag is empty if patient has a Villatoro   - Tubing and IV bags are labeled   S  Safety   - Room is clean, patient is clean, and equipment is clean. - Hallways are clear from equipment besides carts. - Fall bracelet on for fall risk patients  - Ensure room is clear and free of clutter  - Suction is set up for ALL patients (with yanker)  - Hallways are clear from equipment besides carts.    - Isolation precautions followed, supplies available outside room, sign posted

## 2017-02-12 NOTE — PROGRESS NOTES
Problem: Self Care Deficits Care Plan (Adult)  Goal: *Therapy Goal (Edit Goal, Insert Text)  Long Term Goals (to be met upon discharge date) in order to increase pts functional independence and safety, and decrease burden of care:  1. Pt will perform self-feeding with modified independence. 2. Pt will perform grooming with modified independence. 3. Pt will perform UB bathing with modified independence. 4. Pt will perform LB bathing with modified independence. 5. Pt will perform shower transfer with supervision. 6. Pt will perform UB dressing with independence. 7. Pt will perform LB dressing with modified independence. 8. Pt will perform toileting task with modified independence. 9. Pt will perform toilet transfer with modified independence. 10. Pt will perform an IADL task while standing with supervision. Short Term Weekly Goals for (17 to 17) in order to increase pts functional independence and safety, and decrease burden of care:  1. Pt will perform self-feeding with modified independence. 2. Pt will perform grooming with modified independence. 3. Pt will perform UB bathing with modified independence. 4. Pt will perform LB bathing with min A.  5. Pt will perform shower transfer with min A.  6. Pt will perform UB dressing with independence. 7. Pt will perform LB dressing with min A.  8. Pt will perform toileting task with min A.  9. Pt will perform toilet transfer with min A.   OCCUPATIONAL THERAPY DAILY NOTE  Patient Name:Latasha Rashid  Time Spent With Patient  Time In: 1100  Time Out: 2180     Medical Diagnosis:  Left hip fracture  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      Pain at start of tx:10 in her LE  Pain at stop of tx:10 in her LE     Patient identified with name and :yes  Subjective: \"My leg is hurting me to much to move out of this bed. \"     Objective:      THERAPEUTIC EXERCISE Daily Assessment     Pt did complete B UE ROM exercises while in her bed. She was concerned with her L hand & I let her know that we would not hurt her hand yet we need ROM & strengthening exercises for her shoulders & elbows to make it easier to get in-out of bed, wc, etc.       GROOMING Daily Assessment     Grooming  Grooming Assistance : 6 (Modified independent)  Comments: Provided her with her toiletries in her bed as she requested. Assessment: Adapted to patients needs with bedside tx this am.  She talked about wanting to get back to fishing which she loves. OT reviewed the importance of tx participation so that she can be d/c home & return to fishing. Plan of Care: Continue poc to meet her eval goals.      Maycol Velez OTR/L  2/12/2017

## 2017-02-12 NOTE — PROGRESS NOTES
Progress Note    Patient: Usha Pennington MRN: [id number]  SSN: [id number]    YOB: 1955  Age: 64 y.o. Sex: female      Admit Date: 2/8/2017    LOS: 4 days     Subjective:     Patient s/p hip replacement. No acute problems. Objective:     Vitals:    02/11/17 0859 02/11/17 1600 02/11/17 1939 02/12/17 0758   BP: 139/72 142/74 138/69 133/68   Pulse: 83 84 92 80   Resp: 19 23 17 17   Temp: 97.4 °F (36.3 °C) 98.2 °F (36.8 °C) 98.3 °F (36.8 °C) 97.5 °F (36.4 °C)   SpO2: 91% 94% 94% 94%   Weight:       Height:            Intake and Output:  Current Shift: 02/12 0701 - 02/12 1900  In: 240 [P.O.:240]  Out: -   Last three shifts:      Physical Exam:   GENERAL: alert, cooperative, no distress, appears stated age  LUNG: clear to auscultation bilaterally  HEART: regular rate and rhythm, S1, S2 normal, no murmur, click, rub or gallop    Lab/Data Review: All lab results for the last 24 hours reviewed. No new labs    Assessment:     Principal Problem:    Status post total replacement of left hip (2/6/2017)      Overview: Dr. Genesis Amaral    Active Problems:    Primary osteoarthritis of left hip ()      Type 2 diabetes mellitus without complication (HCC) ()      Essential hypertension ()      Decreased calculated glomerular filtration rate (GFR) (2/6/2017)      Overview: Calculated GFR is equivalent to that of CKD stage 2 = 60-89 ml/min      Aftercare following left hip joint replacement surgery (2/6/2017)      Overview: S/P Left total hip replacement (2/6/2017 - Dr. Genseis Amaral)      Acute blood loss as cause of postoperative anemia (2/7/2017)      Chronic pain syndrome ()      Encounter for monitoring of theophylline therapy (2/9/2017)      Overview: Theophylline level (2/09/2017) = 21.0      Prerenal azotemia (2/9/2017)      Overview: BUN/Creatinine ratio (2/09/2017) = 30 (BUN 25, Creatinine 0.84)         Plan:     Continue present management.     Signed By: Brigitte Lozoya MD     February 12, 2017

## 2017-02-13 LAB
ANION GAP BLD CALC-SCNC: 8 MMOL/L (ref 3–18)
BUN SERPL-MCNC: 20 MG/DL (ref 7–18)
BUN/CREAT SERPL: 27 (ref 12–20)
CALCIUM SERPL-MCNC: 9.6 MG/DL (ref 8.5–10.1)
CHLORIDE SERPL-SCNC: 105 MMOL/L (ref 100–108)
CO2 SERPL-SCNC: 28 MMOL/L (ref 21–32)
CREAT SERPL-MCNC: 0.74 MG/DL (ref 0.6–1.3)
GLUCOSE BLD STRIP.AUTO-MCNC: 115 MG/DL (ref 70–110)
GLUCOSE BLD STRIP.AUTO-MCNC: 122 MG/DL (ref 70–110)
GLUCOSE BLD STRIP.AUTO-MCNC: 172 MG/DL (ref 70–110)
GLUCOSE BLD STRIP.AUTO-MCNC: 226 MG/DL (ref 70–110)
GLUCOSE SERPL-MCNC: 162 MG/DL (ref 74–99)
HCT VFR BLD AUTO: 32.2 % (ref 35–45)
HGB BLD-MCNC: 10.4 G/DL (ref 12–16)
PLATELET # BLD AUTO: 432 K/UL (ref 135–420)
POTASSIUM SERPL-SCNC: 4.7 MMOL/L (ref 3.5–5.5)
SODIUM SERPL-SCNC: 141 MMOL/L (ref 136–145)

## 2017-02-13 PROCEDURE — 97535 SELF CARE MNGMENT TRAINING: CPT

## 2017-02-13 PROCEDURE — 97110 THERAPEUTIC EXERCISES: CPT

## 2017-02-13 PROCEDURE — 85049 AUTOMATED PLATELET COUNT: CPT | Performed by: INTERNAL MEDICINE

## 2017-02-13 PROCEDURE — 74011636637 HC RX REV CODE- 636/637: Performed by: INTERNAL MEDICINE

## 2017-02-13 PROCEDURE — 74011250637 HC RX REV CODE- 250/637: Performed by: INTERNAL MEDICINE

## 2017-02-13 PROCEDURE — 65310000000 HC RM PRIVATE REHAB

## 2017-02-13 PROCEDURE — 82962 GLUCOSE BLOOD TEST: CPT

## 2017-02-13 PROCEDURE — 97530 THERAPEUTIC ACTIVITIES: CPT

## 2017-02-13 PROCEDURE — 36415 COLL VENOUS BLD VENIPUNCTURE: CPT | Performed by: INTERNAL MEDICINE

## 2017-02-13 PROCEDURE — 85018 HEMOGLOBIN: CPT | Performed by: INTERNAL MEDICINE

## 2017-02-13 PROCEDURE — 74011250636 HC RX REV CODE- 250/636: Performed by: INTERNAL MEDICINE

## 2017-02-13 PROCEDURE — 80048 BASIC METABOLIC PNL TOTAL CA: CPT | Performed by: INTERNAL MEDICINE

## 2017-02-13 RX ADMIN — PERPHENAZINE 2 MG: 2 TABLET, FILM COATED ORAL at 21:00

## 2017-02-13 RX ADMIN — Medication 325 MG: at 09:48

## 2017-02-13 RX ADMIN — METFORMIN HYDROCHLORIDE 1000 MG: 500 TABLET, FILM COATED ORAL at 17:43

## 2017-02-13 RX ADMIN — FAMOTIDINE 20 MG: 20 TABLET ORAL at 17:44

## 2017-02-13 RX ADMIN — ENOXAPARIN SODIUM 40 MG: 100 INJECTION SUBCUTANEOUS at 05:28

## 2017-02-13 RX ADMIN — MODAFINIL 100 MG: 100 TABLET ORAL at 09:49

## 2017-02-13 RX ADMIN — BUSPIRONE HYDROCHLORIDE 20 MG: 5 TABLET ORAL at 09:48

## 2017-02-13 RX ADMIN — METFORMIN HYDROCHLORIDE 1000 MG: 500 TABLET, FILM COATED ORAL at 09:47

## 2017-02-13 RX ADMIN — CELECOXIB 100 MG: 100 CAPSULE ORAL at 17:44

## 2017-02-13 RX ADMIN — INSULIN GLARGINE 10 UNITS: 100 INJECTION, SOLUTION SUBCUTANEOUS at 21:00

## 2017-02-13 RX ADMIN — OXYCODONE HYDROCHLORIDE AND ACETAMINOPHEN 2 TABLET: 7.5; 325 TABLET ORAL at 22:18

## 2017-02-13 RX ADMIN — BUDESONIDE AND FORMOTEROL FUMARATE DIHYDRATE 2 PUFF: 160; 4.5 AEROSOL RESPIRATORY (INHALATION) at 09:46

## 2017-02-13 RX ADMIN — SIMVASTATIN 20 MG: 20 TABLET, FILM COATED ORAL at 22:17

## 2017-02-13 RX ADMIN — BUSPIRONE HYDROCHLORIDE 20 MG: 5 TABLET ORAL at 17:44

## 2017-02-13 RX ADMIN — AMLODIPINE BESYLATE 5 MG: 5 TABLET ORAL at 09:49

## 2017-02-13 RX ADMIN — OXYCODONE HYDROCHLORIDE AND ACETAMINOPHEN 2 TABLET: 7.5; 325 TABLET ORAL at 17:43

## 2017-02-13 RX ADMIN — SERTRALINE HYDROCHLORIDE 100 MG: 50 TABLET ORAL at 09:49

## 2017-02-13 RX ADMIN — VITAMIN D, TAB 1000IU (100/BT) 2000 UNITS: 25 TAB at 09:48

## 2017-02-13 RX ADMIN — THEOPHYLLINE 200 MG: 200 TABLET, EXTENDED RELEASE ORAL at 09:48

## 2017-02-13 RX ADMIN — THEOPHYLLINE 200 MG: 200 TABLET, EXTENDED RELEASE ORAL at 21:00

## 2017-02-13 RX ADMIN — Medication 250 MG: at 09:48

## 2017-02-13 RX ADMIN — INSULIN LISPRO 4 UNITS: 100 INJECTION, SOLUTION INTRAVENOUS; SUBCUTANEOUS at 12:53

## 2017-02-13 RX ADMIN — OXYCODONE HYDROCHLORIDE AND ACETAMINOPHEN 2 TABLET: 7.5; 325 TABLET ORAL at 10:08

## 2017-02-13 RX ADMIN — INSULIN LISPRO 2 UNITS: 100 INJECTION, SOLUTION INTRAVENOUS; SUBCUTANEOUS at 09:49

## 2017-02-13 RX ADMIN — BUSPIRONE HYDROCHLORIDE 20 MG: 5 TABLET ORAL at 21:00

## 2017-02-13 RX ADMIN — LORATADINE 10 MG: 10 TABLET ORAL at 09:49

## 2017-02-13 RX ADMIN — DOCUSATE SODIUM 100 MG: 100 CAPSULE, LIQUID FILLED ORAL at 09:49

## 2017-02-13 RX ADMIN — MELATONIN TAB 3 MG 3 MG: 3 TAB at 21:00

## 2017-02-13 RX ADMIN — ALLOPURINOL 300 MG: 100 TABLET ORAL at 09:47

## 2017-02-13 RX ADMIN — THERA TABS 1 TABLET: TAB at 09:48

## 2017-02-13 RX ADMIN — FAMOTIDINE 20 MG: 20 TABLET ORAL at 09:49

## 2017-02-13 RX ADMIN — CELECOXIB 100 MG: 100 CAPSULE ORAL at 09:48

## 2017-02-13 RX ADMIN — DOCUSATE SODIUM 100 MG: 100 CAPSULE, LIQUID FILLED ORAL at 17:44

## 2017-02-13 NOTE — PROGRESS NOTES
Problem: Self Care Deficits Care Plan (Adult)  Goal: *Therapy Goal (Edit Goal, Insert Text)  Long Term Goals (to be met upon discharge date) in order to increase pts functional independence and safety, and decrease burden of care:  1. Pt will perform self-feeding with modified independence. 2. Pt will perform grooming with modified independence. 3. Pt will perform UB bathing with modified independence. 4. Pt will perform LB bathing with modified independence. 5. Pt will perform shower transfer with supervision. 6. Pt will perform UB dressing with independence. 7. Pt will perform LB dressing with modified independence. 8. Pt will perform toileting task with modified independence. 9. Pt will perform toilet transfer with modified independence. 10. Pt will perform an IADL task while standing with supervision. Short Term Weekly Goals for (17 to 17) in order to increase pts functional independence and safety, and decrease burden of care:  1. Pt will perform self-feeding with modified independence. 2. Pt will perform grooming with modified independence. 3. Pt will perform UB bathing with modified independence. 4. Pt will perform LB bathing with min A.  5. Pt will perform shower transfer with min A.  6. Pt will perform UB dressing with independence. 7. Pt will perform LB dressing with min A.  8. Pt will perform toileting task with min A.  9. Pt will perform toilet transfer with min A.   OCCUPATIONAL THERAPY DAILY NOTE  Patient Name:Latasha Rashid  Time Spent With Patient  Time In: 6085 (-4 units 2/2 pt refusal)     Medical Diagnosis:  Left hip fracture  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      Pain at start of tx: NT  Pain at stop of tx: NT     Patient identified with name and : yes     Pt appeared resting in bed with OT attempt. Pt stated \"I am not going to work with you.  You are condescending and I was going to almost call my family to leave.\"  OT had not treated pt since Thursday and did not see any conflict from pt then. OT asked if pt would be willing to see another OT and pt stated she will not perform therapy at this time as she just had pain medication and needs to rest. Pt also stated \"I told the lady () everything\". OT consulted to KATHY Harper, OTR  2/13/2017

## 2017-02-13 NOTE — ROUTINE PROCESS
0800 Pt. Awake sitting up in bed alert and oriented x 4 no change in assessment no signs of distress. 0930 Pt. Sitting up in chair eating breakfast no complaints. 1200 Pt. Tolerated PT welll  1330 No change in assessment. Pt able to transfer back from gym with no difficulty. 1500 no complaints pt. Reported to be feeling fine. 1800 Pt. Sitting up in bed watching tv and eating  Dinner no complaints.

## 2017-02-13 NOTE — ROUTINE PROCESS
SHIFT CHANGE NOTE FOR [facility]    Bedside and Verbal shift change report given to GEE Muñoz (oncoming nurse) by Myke Zuleta LPN   (offgoing nurse). Report included the following information SBAR, Kardex, MAR and Recent Results.     Situation:   Code Status: Full Code   Reason for Admission: left hip replacement  Hospital Day: 4   Problem List:   Hospital Problems  Date Reviewed: 2/10/2017          Codes Class Noted POA    Encounter for monitoring of theophylline therapy ICD-10-CM: Z51.81, Z79.899  ICD-9-CM: V58.83, V58.69  2/9/2017 Yes    Overview Signed 2/9/2017 12:35 PM by Taisha Jorgensen MD     Theophylline level (2/09/2017) = 21.0             Prerenal azotemia ICD-10-CM: R79.89  ICD-9-CM: 790.6  2/9/2017 Yes    Overview Signed 2/9/2017 12:35 PM by Taisha Jorgensen MD     BUN/Creatinine ratio (2/09/2017) = 30 (BUN 25, Creatinine 0.84)              Chronic pain syndrome (Chronic) ICD-10-CM: G89.4  ICD-9-CM: 338.4  Unknown Yes        Acute blood loss as cause of postoperative anemia ICD-10-CM: D62  ICD-9-CM: 285.1  2/7/2017 Yes        Primary osteoarthritis of left hip (Chronic) ICD-10-CM: M16.12  ICD-9-CM: 715.15  Unknown Yes        Type 2 diabetes mellitus without complication (HCC) (Chronic) ICD-10-CM: E11.9  ICD-9-CM: 250.00  Unknown Yes        Essential hypertension (Chronic) ICD-10-CM: I10  ICD-9-CM: 401.9  Unknown Yes        Decreased calculated glomerular filtration rate (GFR) ICD-10-CM: R94.4  ICD-9-CM: 794.4  2/6/2017 Yes    Overview Signed 2/8/2017  5:12 PM by Taisha Jorgensen MD     Calculated GFR is equivalent to that of CKD stage 2 = 60-89 ml/min             * (Principal)Status post total replacement of left hip ICD-10-CM: Z54.877  ICD-9-CM: V43.64  2/6/2017 Yes    Overview Signed 2/8/2017  5:13 PM by Taishaaret Rogers, MD Dr. Idelia Castleman             Aftercare following left hip joint replacement surgery ICD-10-CM: Z47.1, T07.326  ICD-9-CM: V54.81, V43.64  2/6/2017 Yes    Overview Signed 2/8/2017 5:13 PM by Edna Chandler MD     S/P Left total hip replacement (2/6/2017 - Dr. Nadege Triana)                   Background:   Past Medical History:   Past Medical History   Diagnosis Date    Acute blood loss as cause of postoperative anemia 2/7/2017    Allergic rhinitis     Anxiety disorder     Bronchial asthma     Cataract of left eye     Chronic [facility]ism ([facility] [address]     Chronic pain syndrome     Decreased calculated glomerular filtration rate (GFR) 2/6/2017     Calculated GFR is equivalent to that of CKD stage 2 = 60-89 ml/min    Depression     Dyslipidemia     Essential hypertension     Gout     History of tobacco abuse     Incisional hernia     Lichen simplex chronicus     Obesity, Class III, BMI 40-49.9 (morbid obesity) ([facility] [address]     Paronychia     Primary osteoarthritis of left hip     Type 2 diabetes mellitus without complication ([facility] [address]       Patient taking anticoagulants yes    Patient has a defibrillator: no     Assessment:   Changes in Assessment throughout shift: No     Patient has central line: no Reasons if yes: Insertion date: Last dressing date:   Patient has Villatoro Cath: no Reasons if yes:     Insertion date:     Last Vitals:     Vitals:    02/11/17 1600 02/11/17 1939 02/12/17 0758 02/12/17 1648   BP: 142/74 138/69 133/68 136/77   Pulse: 84 92 80 84   Resp: 23 17 17 18   Temp: 98.2 °F (36.8 °C) 98.3 °F (36.8 °C) 97.5 °F (36.4 °C) 98.4 °F (36.9 °C)   SpO2: 94% 94% 94% 93%   Weight:       Height:            PAIN    Pain Assessment    Pain Intensity 1: 10 (02/12/17 1743) Pain Intensity 1: 2 (12/29/14 1105)    Pain Location 1: Hip Pain Location 1: Abdomen    Pain Intervention(s) 1: Medication (see MAR), Repositioned, Ice Pain Intervention(s) 1: Medication (see MAR)  Patient Stated Pain Goal: 0 Patient Stated Pain Goal: 0  o Intervention effective: yes    o Other actions taken for pain: No     Skin Assessment  Skin color Skin Color: Appropriate for ethnicity  Condition/Temperature Skin Condition/Temp: Dry, Warm  Integrity Skin Integrity: Incision (comment) (left hip surgical with 29 sathish)  Turgor Turgor: Non-tenting  Weekly Pressure Ulcer Documentation Weekly Pressure Ulcer Documentation: No Pressure Ulcer Noted-Pressure Ulcer Prevention Initiated  Wound Prevention & Protection Methods  Orientation of wound Orientation of Wound Prevention: Posterior  Location of Prevention Location of Wound Prevention: Sacrum/Coccyx  Dressing Present Dressing Present : No  Dressing Status    Wound Offloading Wound Offloading (Prevention Methods): Bed, pressure reduction mattress     INTAKE/OUPUT    Date 02/11/17 1900 - 02/12/17 0659 02/12/17 0700 - 02/13/17 0659   Shift 3499-4771 24 Hour Total 0924-2547 2848-1086 24 Hour Total   I  N  T  A  K  E   P.O.   480  480      P. O.   480  480    Shift Total  (mL/kg)   480  (4.5)  480  (4.5)   O  U  T  P  U  T   Urine  (mL/kg/hr)           Urine Occurrence(s) 3 x 7 x       Stool           Stool Occurrence(s) 0 x 2 x       Shift Total  (mL/kg)        NET   480  480   Weight (kg) 105.7 105.7 105.7 105.7 105.7       Recommendations:  1. Patient needs and requests: education    2. Diet: diabetic    3. Pending tests/procedures: No     4. Functional Level/Equipment: 1 x person assist    5. Estimated Discharge Date: TDB Posted on Whiteboard in Patients Room: no     HEALS Safety Check    A safety check occurred in the patient's room between off going nurse and oncoming nurse listed above.     The safety check included the below items  Area Items   H  High Alert Medications - Verify all high alert medication drips (heparin, PCA, etc.)   E  Equipment - Suction is set up for ALL patients (with yanker)  - Red plugs utilized for all equipment (IV pumps, etc.)  - WOWs wiped down at end of shift.  - Room stocked with oxygen, suction, and other unit-specific supplies   A  Alarms - Bed alarm is set for fall risk patients  - Ensure chair alarm is in place and activated if patient is up in a chair   L  Lines - Check IV for any infiltration  - Villatoro bag is empty if patient has a Villatoro   - Tubing and IV bags are labeled   S  Safety   - Room is clean, patient is clean, and equipment is clean. - Hallways are clear from equipment besides carts. - Fall bracelet on for fall risk patients  - Ensure room is clear and free of clutter  - Suction is set up for ALL patients (with yanker)  - Hallways are clear from equipment besides carts.    - Isolation precautions followed, supplies available outside room, sign posted     Keliluis Doss LPN

## 2017-02-13 NOTE — ROUTINE PROCESS
SHIFT CHANGE NOTE FOR [facility]    Bedside and Verbal shift change report given to Kendall Schilling RN (oncoming nurse) by Katie Rodgers RN LPN   (offgoing nurse). Report included the following information SBAR, Kardex, MAR and Recent Results.     Situation:   Code Status: Full Code   Reason for Admission: left hip replacement  Hospital Day: 5   Problem List:   Hospital Problems  Date Reviewed: 2/10/2017          Codes Class Noted POA    Encounter for monitoring of theophylline therapy ICD-10-CM: Z51.81, Z79.899  ICD-9-CM: V58.83, V58.69  2/9/2017 Yes    Overview Signed 2/9/2017 12:35 PM by Paulette Cota MD     Theophylline level (2/09/2017) = 21.0             Prerenal azotemia ICD-10-CM: R79.89  ICD-9-CM: 790.6  2/9/2017 Yes    Overview Signed 2/9/2017 12:35 PM by Paulette Cota MD     BUN/Creatinine ratio (2/09/2017) = 30 (BUN 25, Creatinine 0.84)              Chronic pain syndrome (Chronic) ICD-10-CM: G89.4  ICD-9-CM: 338.4  Unknown Yes        Acute blood loss as cause of postoperative anemia ICD-10-CM: D62  ICD-9-CM: 285.1  2/7/2017 Yes        Primary osteoarthritis of left hip (Chronic) ICD-10-CM: M16.12  ICD-9-CM: 715.15  Unknown Yes        Type 2 diabetes mellitus without complication (HCC) (Chronic) ICD-10-CM: E11.9  ICD-9-CM: 250.00  Unknown Yes        Essential hypertension (Chronic) ICD-10-CM: I10  ICD-9-CM: 401.9  Unknown Yes        Decreased calculated glomerular filtration rate (GFR) ICD-10-CM: R94.4  ICD-9-CM: 794.4  2/6/2017 Yes    Overview Signed 2/8/2017  5:12 PM by Paulette Cota MD     Calculated GFR is equivalent to that of CKD stage 2 = 60-89 ml/min             * (Principal)Status post total replacement of left hip ICD-10-CM: W87.212  ICD-9-CM: V43.64  2/6/2017 Yes    Overview Signed 2/8/2017  5:13 PM by MD Dr. Jose Antonio Schneider             Aftercare following left hip joint replacement surgery ICD-10-CM: Z47.1, G56.388  ICD-9-CM: V54.81, V43.64  2/6/2017 Yes    Overview Signed 2/8/2017  5:13 PM by Li Acevedo MD     S/P Left total hip replacement (2/6/2017 - Dr. Hope Gutierrez)                   Background:   Past Medical History:   Past Medical History   Diagnosis Date    Acute blood loss as cause of postoperative anemia 2/7/2017    Allergic rhinitis     Anxiety disorder     Bronchial asthma     Cataract of left eye     Chronic alcoholism ([facility] [address]     Chronic pain syndrome     Decreased calculated glomerular filtration rate (GFR) 2/6/2017     Calculated GFR is equivalent to that of CKD stage 2 = 60-89 ml/min    Depression     Dyslipidemia     Essential hypertension     Gout     History of tobacco abuse     Incisional hernia     Lichen simplex chronicus     Obesity, Class III, BMI 40-49.9 (morbid obesity) ([facility] [address]     Paronychia     Primary osteoarthritis of left hip     Type 2 diabetes mellitus without complication ([facility] [address]       Patient taking anticoagulants yes    Patient has a defibrillator: no     Assessment:   Changes in Assessment throughout shift: No     Patient has central line: no Reasons if yes: Insertion date: Last dressing date:   Patient has Villatoro Cath: no Reasons if yes:     Insertion date:     Last Vitals:     Vitals:    02/11/17 1939 02/12/17 0758 02/12/17 1648 02/12/17 1930   BP: 138/69 133/68 136/77 139/72   Pulse: 92 80 84 88   Resp: 17 17 18 24   Temp: 98.3 °F (36.8 °C) 97.5 °F (36.4 °C) 98.4 °F (36.9 °C) 98.3 °F (36.8 °C)   SpO2: 94% 94% 93% 93%   Weight:       Height:            PAIN    Pain Assessment    Pain Intensity 1: 0 (02/13/17 0400) Pain Intensity 1: 2 (12/29/14 1105)    Pain Location 1: Hip Pain Location 1: Abdomen    Pain Intervention(s) 1: Medication (see MAR) Pain Intervention(s) 1: Medication (see MAR)  Patient Stated Pain Goal: 0 Patient Stated Pain Goal: 0  o Intervention effective: yes    o Other actions taken for pain: No     Skin Assessment  Skin color Skin Color: Appropriate for ethnicity  Condition/Temperature Skin Condition/Temp: Dry, Warm  Integrity Skin Integrity: Incision (comment)  Turgor Turgor: Non-tenting  Weekly Pressure Ulcer Documentation Weekly Pressure Ulcer Documentation: No Pressure Ulcer Noted-Pressure Ulcer Prevention Initiated  Wound Prevention & Protection Methods  Orientation of wound Orientation of Wound Prevention: Posterior  Location of Prevention Location of Wound Prevention: Sacrum/Coccyx  Dressing Present Dressing Present : No  Dressing Status    Wound Offloading Wound Offloading (Prevention Methods): Bed, pressure reduction mattress     INTAKE/OUPUT    Date 02/12/17 0700 - 02/13/17 0659 02/13/17 0700 - 02/14/17 0659   Shift 0700-1859 1900-0659 24 Hour Total 3404-2346 3437-8570 24 Hour Total   I  N  T  A  K  E   P.O. 480  480         P. O. 480  480       Shift Total  (mL/kg) 480  (4.5)  480  (4.5)      O  U  T  P  U  T   Urine  (mL/kg/hr)            Urine Occurrence(s)  1 x 1 x       Stool            Stool Occurrence(s)  1 x 1 x       Shift Total  (mL/kg)           480      Weight (kg) 105.7 105.7 105.7 105.7 105.7 105.7       Recommendations:  1. Patient needs and requests: education    2. Diet: diabetic    3. Pending tests/procedures: No     4. Functional Level/Equipment: 1 x person assist    5. Estimated Discharge Date: TDB Posted on Whiteboard in Patients Room: no     HEALS Safety Check    A safety check occurred in the patient's room between off going nurse and oncoming nurse listed above.     The safety check included the below items  Area Items   H  High Alert Medications - Verify all high alert medication drips (heparin, PCA, etc.)   E  Equipment - Suction is set up for ALL patients (with yanker)  - Red plugs utilized for all equipment (IV pumps, etc.)  - WOWs wiped down at end of shift.  - Room stocked with oxygen, suction, and other unit-specific supplies   A  Alarms - Bed alarm is set for fall risk patients  - Ensure chair alarm is in place and activated if patient is up in a chair L  Lines - Check IV for any infiltration  - Villatoro bag is empty if patient has a Villatoro   - Tubing and IV bags are labeled   S  Safety   - Room is clean, patient is clean, and equipment is clean. - Hallways are clear from equipment besides carts. - Fall bracelet on for fall risk patients  - Ensure room is clear and free of clutter  - Suction is set up for ALL patients (with yanker)  - Hallways are clear from equipment besides carts.    - Isolation precautions followed, supplies available outside room, sign posted     Tanvi Ross LPN

## 2017-02-13 NOTE — PROGRESS NOTES
Problem: Mobility Impaired (Adult and Pediatric)  Goal: *Acute Goals and Plan of Care (Insert Text)  Physical Therapy Short Term Goals  Initiated 2/9/2017 and to be accomplished within 7 day(s) (02/16/2017)  1. Patient will move from supine to sit and sit to supine , scoot up and down and roll side to side in bed with moderate assistance . 2. Patient will transfer from bed to chair and chair to bed with moderate assistance using the least restrictive device and safe technique. 3. Patient will perform sit to stand with minimal assistance/contact guard assist.  4. Patient will ambulate with maximal assistance for 15 feet with the least restrictive device. Physical Therapy Long Term Goals  Initiated 2/9/2017 and to be accomplished within 21 day(s) (03/02/2017)  1. Patient will move from supine to sit and sit to supine , scoot up and down and roll side to side in bed with minimal assistance/contact guard assist.   2. Patient will transfer from bed to chair and chair to bed with minimal assistance/contact guard assist using the least restrictive device. 3. Patient will perform sit to stand with supervision/set-up. 4. Patient will ambulate with moderate assistance for 50 feet with the least restrictive device. 5. Patient will ascend/descend 4 stairs with 2 handrail(s) with moderate assistance . PHYSICAL THERAPY DAILY NOTE  Patient Name:Latasha Rashid  Time In: 0930  Time Out: 9343  Time In: 1011  Time Out: 56  Time In: 1400  Time Out: 3225  Diagnosis: Left hip fracture  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  Precautions: Fall Risk: WBAT L hip     Subjective: Patient reports that she is \"not sure that this is the place for [her]\". Patient reports motivation to participate with PT though stating, \"PT makes me want to work. \"  PM: Patient reports comfort with participation in therapy session.      Pain at start of tx:8/10  Pain at stop of tx:4/10     Patient identified with name and :YES         Objective: Session interrupted by nursing for medication pass; 2 units lost  Upon return to therapy gym patient participates in a recreational therapy lead group with standing activity as noted in \"balance\" section. TRANSFERS Daily Assessment     Transfer Type: Other  Other: Patient performs stand step transfer requiring anterior/up lift assist for initial boost transitioning to CGA w/ verbal cues to improve sequence and safety for the remainder of the transfer. Transfer Assistance : 4 (Minimal assistance)  Sit to Stand Assistance: Minimal assistance (transition to SBA for second session x 3 trials)     Patient demonstrates improved anterior COG excursion and upward push with STS in 2nd session. PM: Patient participates in stand step transfer training in 3rd session demonstrating increased attention to verbal and tactile cues as well as improved confidence in transfer technique adjustments. Patient able to transfer to L to bed and to R to w/c at [facility] level. BALANCE Daily Assessment     Sitting - Static: Good (unsupported)  Sitting - Dynamic: Good (unsupported)  Standing - Static: Fair  Standing - Dynamic : Impaired     Patient able to perform static standing activity with object manipulation and socialization for 3 trials: 2'20\"; 3'30\"; and 3'09\" respectively. Patient requires not external support though she does note that she has difficulty not putting her hands on the table (requires verbal cueing to refrain). Assessment: Patient demonstrates improved participation in verbal cues today demonstrating increased efficiency of movement and tolerance for activity despite patient provided reports of difficulty in therapy. Patient appears to respond well to positive verbal cueing to promote adjustments in technique.              Plan of Care: Cont current POC; focus on transfers and ambulation with patient; encourage patient to participate fully with all disciplines. Ole Riggs, PT DPT  2/13/2017

## 2017-02-13 NOTE — REHAB NOTE
Went to pt room and explained to her that i'm going to assist her with getting up to take a bath. Pt very agitated when told about a bath. Pt sit at the side of bed with minimal effort to get into the chair. Call homer for assistance to get pt in the chair because she seemed like she was having a hard time transferring. Pt stated: you might as well turn the water off because i'm going to the bathroom before i take a bath. From now on leave my stuff out and ill take a shower any time of the day I choose to. I explained to PT that it's apart of rehab routine to make sure they are up and ready for therapy in the morning. Pt stated: I don't think  This is the place for me. Explained to PT she can speak to the  upon arrival this morning about her concern.

## 2017-02-13 NOTE — PROGRESS NOTES
[address]  [address] [place] [address], [address]     INPATIENT REHABILITATION  DAILY PROGRESS NOTE     Date: 2/13/2017    Name: Tressa Stiles Age / Sex: 64 y.o. / female   CSN: [id number] MRN: [id number]   [facility] Date: 2/8/2017 Length of Stay: 5 days     Primary Rehab Diagnosis: Impaired Mobility and ADLs secondary to:  1. Primary osteoarthritis of the left hip  2. S/P Left total hip replacement (2/6/2017 - Dr. Zeyad Lane)      Subjective:     Patient seen and examined. Blood pressure controlled. Blood sugars uncontrolled. Patient's Complaint:   No significant medical complaints    Pain Control: stable, mild-to-moderate joint symptoms intermittently, reasonably well controlled by current meds      Objective:     Vital Signs:  Patient Vitals for the past 24 hrs:   BP Temp Pulse Resp SpO2   02/13/17 0735 140/77 97 °F (36.1 °C) 81 20 93 %   02/12/17 1930 139/72 98.3 °F (36.8 °C) 88 24 93 %   02/12/17 1648 136/77 98.4 °F (36.9 °C) 84 18 93 %        Physical Exam:  GENERAL SURVEY: Patient is awake, alert, oriented x 3, sitting comfortably on the chair, not in acute respiratory distress. HEENT: pale palpebral conjunctivae, anicteric sclerae, no nasoaural discharge, moist oral mucosa  NECK: supple, no jugular venous distention, no palpable lymph nodes  CHEST/LUNGS: symmetrical chest expansion, good air entry, clear breath sounds  HEART: adynamic precordium, good S1 S2, no S3, regular rhythm, no murmurs  ABDOMEN: obese, bowel sounds appreciated, soft, non-tender  EXTREMITIES: pale nailbeds, no edema, full and equal pulses, no calf tenderness   NEUROLOGICAL EXAM: The patient is awake, alert and oriented x3, able to answer questions fairly appropriately, able to follow 1 and 2 step commands. Able to tell time from the wall clock. Cranial nerves II-XII are grossly intact. No gross sensory deficit.  Motor strength is 4-/5 on BUE, 4-/5 on the RLE, 1/5 on the left hip, 3/5 on the left knee, 3+/5 on the left ankle.     Incision(s): covered, clean, dry, and intact       Current Medications:  Current Facility-Administered Medications   Medication Dose Route Frequency    metFORMIN (GLUCOPHAGE) tablet 1,000 mg  1,000 mg Oral BID WITH MEALS    insulin glargine (LANTUS) injection 10 Units  10 Units SubCUTAneous QHS    albuterol (PROVENTIL HFA, VENTOLIN HFA, PROAIR HFA) inhaler 2 Puff  2 Puff Inhalation Q4H PRN    ferrous sulfate tablet 325 mg  1 Tab Oral DAILY WITH BREAKFAST    ascorbic acid (vitamin C) (VITAMIN C) tablet 250 mg  250 mg Oral DAILY WITH BREAKFAST    theophylline ER(12 hour) (THEOCHRON) tablet 200 mg  200 mg Oral Q12H    cholecalciferol (VITAMIN D3) tablet 2,000 Units  2,000 Units Oral DAILY    busPIRone (BUSPAR) tablet 20 mg  20 mg Oral TID    melatonin tablet 3 mg  3 mg Oral QHS    modafinil (PROVIGIL) tablet 100 mg  100 mg Oral DAILY    budesonide-formoterol (SYMBICORT) 160-4.5 mcg/actuation HFA inhaler 2 Puff  2 Puff Inhalation BID RT    perphenazine (TRILAFON) tablet tab 2 mg  2 mg Oral QHS    acetaminophen (TYLENOL) tablet 650 mg  650 mg Oral Q4H PRN    docusate sodium (COLACE) capsule 100 mg  100 mg Oral BID    bisacodyl (DULCOLAX) tablet 10 mg  10 mg Oral Q48H PRN    enoxaparin (LOVENOX) injection 40 mg  40 mg SubCUTAneous Q24H    insulin lispro (HUMALOG) injection   SubCUTAneous TIDAC    albuterol (PROVENTIL VENTOLIN) nebulizer solution 2.5 mg  2.5 mg Nebulization Q4H PRN    allopurinol (ZYLOPRIM) tablet 300 mg  300 mg Oral DAILY    amLODIPine (NORVASC) tablet 5 mg  5 mg Oral DAILY    celecoxib (CELEBREX) capsule 100 mg  100 mg Oral BID WITH MEALS    loratadine (CLARITIN) tablet 10 mg  10 mg Oral DAILY    famotidine (PEPCID) tablet 20 mg  20 mg Oral BID    sertraline (ZOLOFT) tablet 100 mg  100 mg Oral DAILY    simvastatin (ZOCOR) tablet 20 mg  20 mg Oral QHS    oxyCODONE-acetaminophen (PERCOCET 7.5) 7.5-325 mg per tablet 1-2 Tab  1-2 Tab Oral Q4H PRN    glucose chewable tablet 16 g  4 Tab Oral PRN    glucagon (GLUCAGEN) injection 1 mg  1 mg IntraMUSCular PRN    dextrose (D50W) injection syrg 12.5-25 g  25-50 mL IntraVENous PRN    fentaNYL (DURAGESIC) 50 mcg/hr patch 1 Patch  1 Patch TransDERmal Q72H    therapeutic multivitamin (THERAGRAN) tablet 1 Tab  1 Tab Oral DAILY    nitroglycerin (NITROSTAT) tablet 0.4 mg  0.4 mg SubLINGual PRN    diphenhydrAMINE (BENADRYL) capsule 25 mg  25 mg Oral Q6H PRN       Allergies:  No Known Allergies      Lab/Data Review:  Recent Results (from the past 24 hour(s))   GLUCOSE, POC    Collection Time: 02/12/17  5:12 PM   Result Value Ref Range    Glucose (POC) 115 (H) 70 - 110 mg/dL   GLUCOSE, POC    Collection Time: 02/12/17  8:05 PM   Result Value Ref Range    Glucose (POC) 215 (H) 70 - 248 mg/dL   METABOLIC PANEL, BASIC    Collection Time: 02/13/17  6:35 AM   Result Value Ref Range    Sodium 141 136 - 145 mmol/L    Potassium 4.7 3.5 - 5.5 mmol/L    Chloride 105 100 - 108 mmol/L    CO2 28 21 - 32 mmol/L    Anion gap 8 3.0 - 18 mmol/L    Glucose 162 (H) 74 - 99 mg/dL    BUN 20 (H) 7.0 - 18 MG/DL    Creatinine 0.74 0.6 - 1.3 MG/DL    BUN/Creatinine ratio 27 (H) 12 - 20      GFR est AA >60 >60 ml/min/1.73m2    GFR est non-AA >60 >60 ml/min/1.73m2    Calcium 9.6 8.5 - 10.1 MG/DL   HGB & HCT    Collection Time: 02/13/17  6:35 AM   Result Value Ref Range    HGB 10.4 (L) 12.0 - 16.0 g/dL    HCT 32.2 (L) 35.0 - 45.0 %   PLATELET    Collection Time: 02/13/17  6:35 AM   Result Value Ref Range    PLATELET 215 (H) 034 - 420 K/uL   GLUCOSE, POC    Collection Time: 02/13/17  7:20 AM   Result Value Ref Range    Glucose (POC) 172 (H) 70 - 110 mg/dL   GLUCOSE, POC    Collection Time: 02/13/17 11:30 AM   Result Value Ref Range    Glucose (POC) 226 (H) 70 - 110 mg/dL       Creatinine Clearance (Cockcroft Gault): On admission, estimated creatinine clearance was 81.8 ml/min (based on Cr of 0.84).   Most recent estimated creatinine clearance is 92.9 mL/min (based on Cr of 0.74). Assessment:     Primary Rehab Diagnosis  1. Impaired Mobility and ADLs  2. Primary osteoarthritis of the left hip  3. S/P Left total hip replacement (2/6/2017 - Dr. Zelaya Gaviota)     Comorbidities   Bronchial asthma    Type 2 diabetes mellitus without complication    Essential hypertension    Depression    Dyslipidemia    Decreased calculated glomerular filtration rate (GFR)    Aftercare following left hip joint replacement surgery    Acute blood loss as cause of postoperative anemia    Gout    Allergic rhinitis    Cataract of left eye    Incisional hernia    Lichen simplex chronicus    Paronychia    Chronic alcoholism    Tobacco abuse    Chronic pain syndrome    Obesity, Class III, BMI 40-49.9 (morbid obesity)     Anxiety disorder    Encounter for monitoring of theophylline therapy    Prerenal azotemia       Plan:     1. Justification for continued stay: Good progression towards established rehabilitation goals. 2. Medical Issues being followed closely:    [x]  Fall and safety precautions     [x]  Wound Care     [x]  Bowel and Bladder Function     [x]  Fluid Electrolyte and Nutrition Balance     [x]  Pain Control      3. Issues that 24 hour rehabilitation nursing is following:    [x]  Fall and safety precautions     [x]  Wound Care     [x]  Bowel and Bladder Function     [x]  Fluid Electrolyte and Nutrition Balance     [x]  Pain Control      [x]  Assistance with and education on in-room safety with transfers to and from the bed, wheelchair, toilet and shower. 4. Acute rehabilitation plan of care:    [x]  Continue current care and rehab. [x]  Physical Therapy           [x]  Occupational Therapy           []  Speech Therapy     []  Hold Rehab until further notice     5. Medications:    [x]  MAR Reviewed     [x]  Continue Present Medications     6.  DVT Prophylaxis:      [x]  Lovenox     []  Unfractionated Heparin     []  Coumadin []  Xarelto     [x]  RUIZ Stockings     []  Sequential Compression Device     []  None     7.  Orders:   > Primary osteoarthritis of the left hip; S/P Left total hip replacement (2/6/2017 - Dr. Nadege Triana)   > Weightbearing as tolerated on the left lower extremity   > Total hip precautions on the left hip    > Staples to be removed on 2/20/2017     > Acute Postoperative Blood Loss Anemia   > Hgb/Hct (2/09/2017, on admission) = 9.9/29.6   > Anemia work-up showed serum iron 16, TIBC 210, iron % saturation 8, ferritin 215, reticulocyte count 1.3   > Patient was started on FeSO4 and Ascorbic acid   > Continue:    > FeSO4 325 mg PO once daily with breakfast     > Ascorbic Acid 250 mg PO once daily with breakfast (to enhance the absorption of the FeSO4)     > Anxiety / Depression   > Patient was discharged from [address] [place] [address] on:    > Diazepam 10 mg PO q 6 hr PRN for anxiety    > Buspirone 30 mg PO daily    > Perphenazine 4 mg PO BID    > Sertraline 100 mg PO once daily   > Confirmed psychiatric medications from [address] [place] [address] as follows:    > Diazepam 10 mg PO TID PRN    > Buspirone 30 mg PO 4 times daily    > Perphenazine 2 mg PO q HS    > Sertraline 100 mg PO BID   > On 2/10/2017:    > Held Diazepam 10 mg PO TID PRN    > Changed Buspirone from 30 mg PO daily to 20 mg PO TID    > Decreased Perphenazine from 4 mg PO BID to 2 mg PO q HS    > Added:     > Modafinil 100 mg PO q 8AM     > Melatonin 3 mg PO q HS    > Continue:     > Buspirone 20 mg PO TID     > Perphenazine 2 mg PO q HS     > Sertraline 100 mg PO once daily     > Modafinil 100 mg PO q 8AM     > Melatonin 3 mg PO q HS    > Bronchial asthma   > On 2/09/2017, discontinued:    > Arformoterol nebulization BID    > Budesonide nebulization BID    > Theophylline level (2/09/2017) = 21.0 (N.V. = 5-15)    > Hold Theophylline  mg PO q 12 hr   > On 2/10/2017, patient was started on Symbicort 160-4.5 2 puffs inhaled BID   > Resume Theophylline ER at a decreased dose of 200 mg PO q 12 hr   > Continue:    > Symbicort 160-4.5 2 puffs inhaled BID    > Theophylline ER at a decreased dose of 200 mg PO q 12 hr    > Albuterol nebulization q 4 hr PRN for shortness of breath     > Essential hypertension   > Continue Amlodipine 5 mg PO once daily (9AM)     > Type 2 diabetes mellitus   > On 2/10/2017:    > Added Lantus 10 units SC q HS    > Increased Metformin from 500 mg to 1,000 mg PO BID with meals   > Continue:    > Increase Lantus from 10 units to 14 units SC q HS    > Metformin 1,000 mg PO BID with meals    > Humalog insulin sliding scale SC TID AC only     > Prerenal azotemia    > BUN/Creatinine ratio (2/09/2017) = 30 (BUN 25, Creatinine 0.84)    > BUN/Creatinine ratio (2/13/2017) = 27 (BUN 20, Creatinine 0.74)    > Increase oral fluid intake     > Chronic pain syndrome   > Continue:    > Acetaminophen 650 mg PO q 4 hr PRN for pain level less than 5/10    > Celecoxib 100 mg PO BID PC    > Fentanyl 50 mcg/hr patch, 1 patch on skin q 72 hr     > Percocet 7.5/325 1-2 tabs PO q 4 hr PRN for breakthrough pain level greater than 4/10 (from 8PM to 4AM only)       > Vitamin D 25-Hydroxy (2/9/2017) = 30.2   > Patient was started on Cholecalciferol 2,000 units PO once daily       8. Patient's progress in rehabilitation and medical issues discussed with the patient. All questions answered to the best of my ability. Care plan discussed with patient/family and nurse.       Signed:    Betty Gautam MD    February 13, 2017

## 2017-02-14 LAB
GLUCOSE BLD STRIP.AUTO-MCNC: 113 MG/DL (ref 70–110)
GLUCOSE BLD STRIP.AUTO-MCNC: 123 MG/DL (ref 70–110)
GLUCOSE BLD STRIP.AUTO-MCNC: 155 MG/DL (ref 70–110)
GLUCOSE BLD STRIP.AUTO-MCNC: 188 MG/DL (ref 70–110)

## 2017-02-14 PROCEDURE — 97530 THERAPEUTIC ACTIVITIES: CPT

## 2017-02-14 PROCEDURE — 65310000000 HC RM PRIVATE REHAB

## 2017-02-14 PROCEDURE — 74011250637 HC RX REV CODE- 250/637: Performed by: INTERNAL MEDICINE

## 2017-02-14 PROCEDURE — 97116 GAIT TRAINING THERAPY: CPT

## 2017-02-14 PROCEDURE — 82962 GLUCOSE BLOOD TEST: CPT

## 2017-02-14 PROCEDURE — 74011250636 HC RX REV CODE- 250/636: Performed by: INTERNAL MEDICINE

## 2017-02-14 PROCEDURE — 74011636637 HC RX REV CODE- 636/637: Performed by: INTERNAL MEDICINE

## 2017-02-14 PROCEDURE — 97535 SELF CARE MNGMENT TRAINING: CPT

## 2017-02-14 RX ORDER — TEMAZEPAM 15 MG/1
15 CAPSULE ORAL
Status: DISCONTINUED | OUTPATIENT
Start: 2017-02-14 | End: 2017-02-17 | Stop reason: HOSPADM

## 2017-02-14 RX ORDER — BACLOFEN 10 MG/1
10 TABLET ORAL EVERY 8 HOURS
Status: DISCONTINUED | OUTPATIENT
Start: 2017-02-14 | End: 2017-02-14

## 2017-02-14 RX ADMIN — OXYCODONE HYDROCHLORIDE AND ACETAMINOPHEN 2 TABLET: 7.5; 325 TABLET ORAL at 09:09

## 2017-02-14 RX ADMIN — CELECOXIB 100 MG: 100 CAPSULE ORAL at 09:03

## 2017-02-14 RX ADMIN — LORATADINE 10 MG: 10 TABLET ORAL at 09:03

## 2017-02-14 RX ADMIN — INSULIN LISPRO 2 UNITS: 100 INJECTION, SOLUTION INTRAVENOUS; SUBCUTANEOUS at 13:06

## 2017-02-14 RX ADMIN — THERA TABS 1 TABLET: TAB at 09:03

## 2017-02-14 RX ADMIN — OXYCODONE HYDROCHLORIDE AND ACETAMINOPHEN 2 TABLET: 7.5; 325 TABLET ORAL at 17:34

## 2017-02-14 RX ADMIN — INSULIN LISPRO 2 UNITS: 100 INJECTION, SOLUTION INTRAVENOUS; SUBCUTANEOUS at 17:32

## 2017-02-14 RX ADMIN — METFORMIN HYDROCHLORIDE 1000 MG: 500 TABLET, FILM COATED ORAL at 17:33

## 2017-02-14 RX ADMIN — FAMOTIDINE 20 MG: 20 TABLET ORAL at 17:33

## 2017-02-14 RX ADMIN — MODAFINIL 100 MG: 100 TABLET ORAL at 09:03

## 2017-02-14 RX ADMIN — VITAMIN D, TAB 1000IU (100/BT) 2000 UNITS: 25 TAB at 09:02

## 2017-02-14 RX ADMIN — TEMAZEPAM 15 MG: 15 CAPSULE ORAL at 20:36

## 2017-02-14 RX ADMIN — INSULIN GLARGINE 10 UNITS: 100 INJECTION, SOLUTION SUBCUTANEOUS at 20:37

## 2017-02-14 RX ADMIN — SERTRALINE HYDROCHLORIDE 100 MG: 50 TABLET ORAL at 09:03

## 2017-02-14 RX ADMIN — BUSPIRONE HYDROCHLORIDE 20 MG: 5 TABLET ORAL at 21:15

## 2017-02-14 RX ADMIN — Medication 250 MG: at 09:02

## 2017-02-14 RX ADMIN — MELATONIN TAB 3 MG 3 MG: 3 TAB at 20:37

## 2017-02-14 RX ADMIN — FAMOTIDINE 20 MG: 20 TABLET ORAL at 09:03

## 2017-02-14 RX ADMIN — BUSPIRONE HYDROCHLORIDE 20 MG: 5 TABLET ORAL at 09:02

## 2017-02-14 RX ADMIN — AMLODIPINE BESYLATE 5 MG: 5 TABLET ORAL at 09:03

## 2017-02-14 RX ADMIN — METFORMIN HYDROCHLORIDE 1000 MG: 500 TABLET, FILM COATED ORAL at 09:03

## 2017-02-14 RX ADMIN — THEOPHYLLINE 200 MG: 200 TABLET, EXTENDED RELEASE ORAL at 20:37

## 2017-02-14 RX ADMIN — OXYCODONE HYDROCHLORIDE AND ACETAMINOPHEN 2 TABLET: 7.5; 325 TABLET ORAL at 20:36

## 2017-02-14 RX ADMIN — BUSPIRONE HYDROCHLORIDE 20 MG: 5 TABLET ORAL at 17:33

## 2017-02-14 RX ADMIN — SIMVASTATIN 20 MG: 20 TABLET, FILM COATED ORAL at 21:15

## 2017-02-14 RX ADMIN — Medication 325 MG: at 09:04

## 2017-02-14 RX ADMIN — BUDESONIDE AND FORMOTEROL FUMARATE DIHYDRATE 2 PUFF: 160; 4.5 AEROSOL RESPIRATORY (INHALATION) at 08:00

## 2017-02-14 RX ADMIN — THEOPHYLLINE 200 MG: 200 TABLET, EXTENDED RELEASE ORAL at 10:14

## 2017-02-14 RX ADMIN — PERPHENAZINE 2 MG: 2 TABLET, FILM COATED ORAL at 21:15

## 2017-02-14 RX ADMIN — ENOXAPARIN SODIUM 40 MG: 100 INJECTION SUBCUTANEOUS at 06:58

## 2017-02-14 RX ADMIN — ALLOPURINOL 300 MG: 100 TABLET ORAL at 09:03

## 2017-02-14 RX ADMIN — CELECOXIB 100 MG: 100 CAPSULE ORAL at 17:33

## 2017-02-14 NOTE — PROGRESS NOTES
prema submitted clinical updates to pt's  to request extension of auth. Prema will follow. 2/15/17 Prema left a message on 's voicemail requesting a return call with determination.      2/15/17 Prema received voicemail stating that the pt has been approved with next update on 2/22 or dc on 2/23

## 2017-02-14 NOTE — PROGRESS NOTES
conducted a Follow up consultation and Spiritual Assessment for Mendy Brumfield, who is a 64 y.o.,female. The  provided the following Interventions:  Continued the relationship of care and support. Listened empathically. Offered prayer and assurance of continued prayer on patients behalf. Chart reviewed. The following outcomes were achieved:  Patient expressed gratitude for 's visit. Assessment:  Patient expressed frustration over a lack of independence. Not happy with services provided especially at night.  prayed for peace and gratitude     Plan:  Chaplains will continue to follow and will provide pastoral care on an as needed/requested basis.  recommends bedside caregivers page  on duty if patient shows signs of acute spiritual or emotional distress.        [address] [facility]  [phone]

## 2017-02-14 NOTE — ROUTINE PROCESS
0800 Pt. Awake sitting up in bed alert and oriented x 4 no change in assessment no signs of distress pt. Reported to be feeling fine. 0930 Pt. Sitting up in chair eating breakfast no complaints. 1200 Pt. Tolerated PT. Well no difficulty. 1330 Pt. Transferred back from gym by wheelchair with no complaints. 1500 no change in assessment no signs of distress pt. Reported to be feeling fine. 1800 Pt. Sitting up in chair eating dinner no complaints.

## 2017-02-14 NOTE — INTERDISCIPLINARY ROUNDS
[facility] PHYSICAL REHABILITATION  [address] [place] [address], [address]    INPATIENT REHABILITATION  TEAM CONFERENCE SUMMARY     Date of Conference: 2/15/2017    Name: Britany Guzman Age / Sex: 64 y.o. / female   CSN: [id number] MRN: [id number]   [facility] Date: 2/8/2017 Length of Stay: 6 days     Primary Rehab Diagnosis  1. Impaired Mobility and ADLs  2. Primary osteoarthritis of the left hip  3. S/P Left total hip replacement (2/6/2017 - Dr. Maine Nguyen)      Comorbidities   Bronchial asthma    Type 2 diabetes mellitus without complication    Essential hypertension    Depression    Dyslipidemia    Decreased calculated glomerular filtration rate (GFR)    Aftercare following left hip joint replacement surgery    Acute blood loss as cause of postoperative anemia    Gout    Allergic rhinitis    Cataract of left eye    Incisional hernia    Lichen simplex chronicus    Paronychia    Chronic alcoholism    Tobacco abuse    Chronic pain syndrome    Obesity, Class III, BMI 40-49.9 (morbid obesity)     Anxiety disorder    Encounter for monitoring of theophylline therapy    Prerenal azotemia         Therapy:     FIM SCORES Initial Assessment Weekly Progress Assessment 2/14/2017   Eating Functional Level: 5  Comments: Pt requires set-up  for opening some containers 2/2 decreased hand strength. Pt has partial dental fixtures. Feeding/Eating Assistance: 7 (Independent)   Swallowing     Grooming 5 Grooming Assistance : 7 (Independent)   Bathing 1 (Pt stood in shower at home.) 5   Upper Body Dressing Functional Level: 5  Items Applied/Steps Completed: Pullover (4 steps)  Comments: Set-up to provide pt clothing. Pt able to nany/doff pullover shirt while seated. 6   Lower Body Dressing Functional Level: 2  Items Applied/Steps Completed: Sock, left (1 step), Sock, right (1 step), Elastic waist pants (3 steps)  Comments: Pt required assistance to thread pants over LEs and socks over feet.  pt stood with RW/min A to nany pants over hips with assistance for back of pants. 4   Toileting Functional Level: 2  Comments: Pt is able to manage hygiene while seated and stood for clothing management with min A/RW. OT assisted with managing pants over pt's buttocks. Toileting Assistance (FIM Score): 6 (Modified independent)  Adaptive Equipment: Grab bars   Bladder  level of assist 3 6   Bladder  accident frequency score 6 6   Bowel  level of assist 2 5   Bowel  accident frequency score 5     Toilet Transfer [place] Toilet Transfer Score: 0  Comments: Pt required min A for stand step transfer from w/c to/from toilet with RW. Pt required mod A for sit to stand from toilet. OT will place [facility] over toilet for increased independence with sit to stand transfers from toilet. 4 (Contact guard assistance)   Tub/Shower Transfer [place] Tub or Shower Type: Tub/Shower combination  Tub/Shower Transfer Score: 0  Comments: NT 2/2 pt bathed at sink this session. Shower  4 (Contact guard assistance)   Comprehension Primary Mode of Comprehension: Auditory  Score: 7 Auditory  7   Expression Primary Mode of Expression: Verbal  Score: 7 Verbal  7   Social Interaction Score: 7 7   Problem Solving Score: 7 5   Memory Score: 7 6     FIM SCORES Initial Assessment Weekly Progress Assessment 2/14/2017   Bed/Chair/Wheelchair Transfers Transfer Type: Other  Other: Patient performs stand step transfer requiring mod a x1 to assist with upright posture, B UE support, weight shift assistance, assisted pivot, controlled lowering, and tactile cueing to improve body position. Patient un-receptive to verbal cueing or assistance to improve form/technique. Patient demosntrates standing squat form with combination pivot and lowering to target surface, consistently reaching for external support to pull-to-stand with STS transition and stepping during pivot/step phase of transfer. Patient nont-reeptive to verbal cues to prevent reaching. patient with continued lack of recptiveness to education t/o final 3 transfers in session. Transfer Assistance : 3 (Moderate assistance )  Sit to Stand Assistance: Moderate assistance  Car Transfers: Not tested  Car Type: N/A Transfer Type: Other  Other: Patient participates in stand step transfer requiring Min a x 1 for STS transfer requiring boost and verbal cues for B UE placement. Patient requires B UE support on PT during step portion of transfer with verbal cueing for sequence and safety. Patient demonstrates tendency toward reaching to grab target surface prior to proper body positioning.  Patient performs stand to sit transition w/o requirement of physical assistance  Transfer Assistance : 4 (Minimal assistance)   Bed Mobility Rolling Right 2 (Maximal assistance)   Rolling Left 1 (Total assistance) (unable to achieve full)   Supine to Sit 1 (Total assistance) (requires trunk and LE assist with HOB elevate >30 deg)   Sit to Stand Moderate assistance   Sit to Supine 2 (Maximal assistance) (Trunk and B LE assistance)    Rolling Right   2 (Maximal assistance)   Rolling Left   4 min a x 1    Supine to Sit   3 moderate assistance   Sit to Stand   Sit to Stand Assistance: Minimal assistance (initial STS Kevin x 1 for boost; final 2 transfers CGA)   Sit to Supine   NT      Locomotion (W/C) Able to Propel (ft): 120 feet  Functional Level: 2 (SPV w/ B UE technique and LE for steering assist)  Curbs/Ramps Assist Required (FIM Score): 0 (Not tested)  Wheelchair Setup Assist Required : 1 (Dependent/total assistance)  Wheelchair Management: Manages left brake, Manages right brake Function 6  Setup Assistance  4     Locomotion (W/C distance) 120 Feet (x 2 trials B UE technique w/ B LE for steering assistance) 250 ft   Locomotion (Walk)  (Pt unable) Min a x 1     Locomotion (Walk dist.) 0 Feet (ft) 45 ft   Steps/Stairs Steps/Stairs Ambulated (#): 0  Level of Assist : 0 (Not tested)  Rail Use:  (N/A) Pt unable Nursing:     Neuro:   A&O x_4___                   Respiratory:   [x] WNL   [] O2   [] LPM ______   Other:  Peripheral Vascular:   [x] TEDS present   [] Edema present ____ Grade  Cardiac:   [x] WNL   [] Other  Genitourinary:   [x] continent   [] incontinent   [] [place]  Abdominal ___2/12____ LBM  GI: _diabetic______ Diet ______ Liquids _____ tube feeds  Musculoskeletal: ____ ROM Transfers _____ Assistive Device Used  __Mod__ Level of Assistance  Skin Integumentary:   [x] Intact   [] Not Intact   __________Preventative Measures  Details______________________________________________________________  Pain: [x] Controlled   [] Not Controlled   Pain Meds:   [] Scheduled   [] PRN        Registered Dietitian / Nutrition:   Patient Vitals for the past 100 hrs:   % Diet Eaten   02/14/17 1000 100 %   02/13/17 1800 60 %   02/13/17 1400 85 %   02/13/17 1000 100 %   02/12/17 1828 60 %   02/12/17 0930 70 %     Patient reported having good appetite and meal intake. Discussed preferences for sugar substitute. Supplements:          [] Yes   [x] No      Amount of supplement consumed:  Not applicable        Intake/Output Summary (Last 24 hours) at 02/14/17 1048  Last data filed at 02/14/17 1000   Gross per 24 hour   Intake              900 ml   Output                0 ml   Net              900 ml                                Last bowel movement:  2/12       Interdisciplinary Team Goals:     1. Goal Patient will ambulate 70 ft w/ min a x 1. Barrier  Pain; weakness; OA    Intervention  TE; TA; Gait training     2. Goal  Patient to be safe with t/f at all times to and from 3:1 commode. Barrier  Patient c/o pain in knees and shoulders    Intervention  education on the benefit to con't with therapy although painful. 3. Goal      Barrier      Intervention       4. Goal  Nursing , free from falls and other injuries while in rehab.     Barrier  balance, hip replacement    Intervention  fall safety, bed and chair alarms. 5. Goal      Barrier      Intervention       6. Goal  Po intake of meals will meet >75% of patient estimated nutritional needs within the next 5-7 days. - previous goal outcome: met    Barrier  none known     Intervention  modified diet; update food preferences        Disposition / Discharge Planning: Follow-up therapy services:  tbd   DME recommendations:  tbd   Estimated discharge date:  3/1/17   Discharge Location:  home         Electronic Signatures:      Signature Date Signed   Physical Therapist    Matti Kamla PT DPT  02/14/2017   Occupational Therapist    Brandon FLOYD/L  2/14/17   Speech Therapist         Recreational Therapist    Lisbeth Cobian, CTRS 2/14/17   Nursing    Carolyn Randle, GEE  2/14/17   Dietitian    Kranthi Aguilar, [address] [place] [address]  2/14/17   Clinical Psychologist    Marlen Talavera, Ph.D. 2/15/17    Physician    Teodoro Rose. Fe Zamudio MD  2/14/2017       Roxann Lyles, MSW  2/14/17         The above information has been reviewed with the patient in a language that they can understand. Opportunity for comments and questions has been provided and a signed attestation has been scanned into the \"media tab\" of the EMR.       Patient Signature: ______________________________________________________    Date Signed: __________________________________________________________

## 2017-02-14 NOTE — ROUTINE PROCESS
SHIFT CHANGE NOTE FOR [facility]    Bedside and Verbal shift change report given to Chastity Tabares RN (oncoming nurse) by Hansel Boyer RN    (offgoing nurse). Report included the following information SBAR, Kardex, MAR and Recent Results.     Situation:   Code Status: Full Code   Reason for Admission: left hip replacement  Hospital Day: 6   Problem List:   Hospital Problems  Date Reviewed: 2/13/2017          Codes Class Noted POA    Encounter for monitoring of theophylline therapy ICD-10-CM: Z51.81, Z79.899  ICD-9-CM: V58.83, V58.69  2/9/2017 Yes    Overview Signed 2/9/2017 12:35 PM by Amaury Cho MD     Theophylline level (2/09/2017) = 21.0             Prerenal azotemia ICD-10-CM: R79.89  ICD-9-CM: 790.6  2/9/2017 Yes    Overview Signed 2/9/2017 12:35 PM by Amaury Cho MD     BUN/Creatinine ratio (2/09/2017) = 30 (BUN 25, Creatinine 0.84)              Chronic pain syndrome (Chronic) ICD-10-CM: G89.4  ICD-9-CM: 338.4  Unknown Yes        Acute blood loss as cause of postoperative anemia ICD-10-CM: D62  ICD-9-CM: 285.1  2/7/2017 Yes        Primary osteoarthritis of left hip (Chronic) ICD-10-CM: M16.12  ICD-9-CM: 715.15  Unknown Yes        Type 2 diabetes mellitus without complication (HCC) (Chronic) ICD-10-CM: E11.9  ICD-9-CM: 250.00  Unknown Yes        Essential hypertension (Chronic) ICD-10-CM: I10  ICD-9-CM: 401.9  Unknown Yes        Decreased calculated glomerular filtration rate (GFR) ICD-10-CM: R94.4  ICD-9-CM: 794.4  2/6/2017 Yes    Overview Signed 2/8/2017  5:12 PM by Amaury Cho MD     Calculated GFR is equivalent to that of CKD stage 2 = 60-89 ml/min             * (Principal)Status post total replacement of left hip ICD-10-CM: K16.907  ICD-9-CM: V43.64  2/6/2017 Yes    Overview Signed 2/8/2017  5:13 PM by MD Dr. Yesi García             Aftercare following left hip joint replacement surgery ICD-10-CM: Z47.1, L71.778  ICD-9-CM: V54.81, V43.64  2/6/2017 Yes    Overview Signed 2/8/2017  5:13 PM by Vi Vasques MD     S/P Left total hip replacement (2/6/2017 - Dr. Shanika Schmidt)                   Background:   Past Medical History:   Past Medical History   Diagnosis Date    Acute blood loss as cause of postoperative anemia 2/7/2017    Allergic rhinitis     Anxiety disorder     Bronchial asthma     Cataract of left eye     Chronic alcoholism (HCC)     Chronic pain syndrome     Decreased calculated glomerular filtration rate (GFR) 2/6/2017     Calculated GFR is equivalent to that of CKD stage 2 = 60-89 ml/min    Depression     Dyslipidemia     Essential hypertension     Gout     History of tobacco abuse     Incisional hernia     Lichen simplex chronicus     Obesity, Class III, BMI 40-49.9 (morbid obesity) ([facility] [address]     Paronychia     Primary osteoarthritis of left hip     Type 2 diabetes mellitus without complication ([facility] [address]       Patient taking anticoagulants yes, Lovenox   Patient has a defibrillator: no     Assessment:   Changes in Assessment throughout shift: No     Patient has central line: no Reasons if yes: n/a  Insertion date:  N/a    Last dressing date:  n/a   Patient has Villatoro Cath: no Reasons if yes:  n/a  Insertion date:  n/a     Last Vitals:     Vitals:    02/13/17 0735 02/13/17 1658 02/1955 02/14/17 0727   BP: 140/77 151/80 155/86 126/66   Pulse: 81 85 87 67   Resp: 20 20 20 18   Temp: 97 °F (36.1 °C) 96.8 °F (36 °C) 99 °F (37.2 °C) 97 °F (36.1 °C)   SpO2: 93% 93% 92% 97%   Weight:       Height:            PAIN    Pain Assessment    Pain Intensity 1: 5 (02/14/17 0909) Pain Intensity 1: 2 (12/29/14 1105)    Pain Location 1: Back Pain Location 1: Abdomen    Pain Intervention(s) 1: Medication (see MAR) Pain Intervention(s) 1: Medication (see MAR)  Patient Stated Pain Goal: 0 Patient Stated Pain Goal: 0  o Intervention effective: yes    o Other actions taken for pain: No     Skin Assessment  Skin color Skin Color: Appropriate for ethnicity  Condition/Temperature Skin Condition/Temp: Dry, Warm  Integrity Skin Integrity: Incision (comment) (Left hip)  Turgor Turgor: Non-tenting  Weekly Pressure Ulcer Documentation Weekly Pressure Ulcer Documentation: No Pressure Ulcer Noted-Pressure Ulcer Prevention Initiated  Wound Prevention & Protection Methods  Orientation of wound Orientation of Wound Prevention: Posterior  Location of Prevention Location of Wound Prevention: Sacrum/Coccyx  Dressing Present Dressing Present : No  Dressing Status    Wound Offloading Wound Offloading (Prevention Methods): Bed, pressure reduction mattress, Chair cushion     INTAKE/OUPUT    Date 02/13/17 0700 - 02/14/17 0659 02/14/17 0700 - 02/15/17 0659   Shift 5600-8192 6242-1074 24 Hour Total 1769-4925 6409-2243 24 Hour Total   I  N  T  A  K  E   P.O. 1020  1020         P. O. 1020  1020       Shift Total  (mL/kg) 1020  (9.7)  1020  (9.7)      O  U  T  P  U  T   Urine  (mL/kg/hr)            Urine Occurrence(s) 4 x 2 x 6 x 0 x  0 x    Stool            Stool Occurrence(s) 1 x 0 x 1 x 0 x  0 x    Shift Total  (mL/kg)         NET 1020  1020      Weight (kg) 105.7 105.7 105.7 105.7 105.7 105.7       Recommendations:  1. Patient needs and requests: education, emotional support and pain management. 2. Diet: diabetic    3. Pending tests/procedures: No     4. Functional Level/Equipment: 1 x person assist    5. Estimated Discharge Date: TDB Posted on Whiteboard in Patients Room: [facility] Safety Check    A safety check occurred in the patient's room between off going nurse and oncoming nurse listed above.     The safety check included the below items  Area Items   H  High Alert Medications - Verify all high alert medication drips (heparin, PCA, etc.)   E  Equipment - Suction is set up for ALL patients (with yanker)  - Red plugs utilized for all equipment (IV pumps, etc.)  - WOWs wiped down at end of shift.  - Room stocked with oxygen, suction, and other unit-specific supplies A  Alarms - Bed alarm is set for fall risk patients  - Ensure chair alarm is in place and activated if patient is up in a chair   L  Lines - Check IV for any infiltration  - Villatoro bag is empty if patient has a Villatoro   - Tubing and IV bags are labeled   S  Safety   - Room is clean, patient is clean, and equipment is clean. - Hallways are clear from equipment besides carts. - Fall bracelet on for fall risk patients  - Ensure room is clear and free of clutter  - Suction is set up for ALL patients (with yanker)  - Hallways are clear from equipment besides carts.    - Isolation precautions followed, supplies available outside room, sign posted     Edmond Shells LPN

## 2017-02-14 NOTE — PROGRESS NOTES
[address]  [address] [place] [address], [address]     INPATIENT REHABILITATION  DAILY PROGRESS NOTE     Date: 2/14/2017    Name: David Torres Age / Sex: 64 y.o. / female   CSN: [id number] MRN: [id number]   [facility] Date: 2/8/2017 Length of Stay: 6 days     Primary Rehab Diagnosis: Impaired Mobility and ADLs secondary to:  1. Primary osteoarthritis of the left hip  2. S/P Left total hip replacement (2/6/2017 - Dr. Swapnil Grande)      Subjective:     Patient seen and examined. Blood pressure controlled. Blood sugars uncontrolled. Refusing to take Symbicort PM dose. Patient's Complaint:   Has problem sleeping    Pain Control: stable, mild-to-moderate joint symptoms intermittently, reasonably well controlled by current meds      Objective:     Vital Signs:  Patient Vitals for the past 24 hrs:   BP Temp Pulse Resp SpO2   02/14/17 0727 126/66 97 °F (36.1 °C) 67 18 97 %   02/1955 155/86 99 °F (37.2 °C) 87 20 92 %   02/13/17 1658 151/80 96.8 °F (36 °C) 85 20 93 %        Physical Exam:  GENERAL SURVEY: Patient is awake, alert, oriented x 3, sitting comfortably on the chair, not in acute respiratory distress. HEENT: pale palpebral conjunctivae, anicteric sclerae, no nasoaural discharge, moist oral mucosa  NECK: supple, no jugular venous distention, no palpable lymph nodes  CHEST/LUNGS: symmetrical chest expansion, good air entry, clear breath sounds  HEART: adynamic precordium, good S1 S2, no S3, regular rhythm, no murmurs  ABDOMEN: obese, bowel sounds appreciated, soft, non-tender  EXTREMITIES: pale nailbeds, no edema, full and equal pulses, no calf tenderness   NEUROLOGICAL EXAM: The patient is awake, alert and oriented x3, able to answer questions fairly appropriately, able to follow 1 and 2 step commands. Able to tell time from the wall clock. Cranial nerves II-XII are grossly intact. No gross sensory deficit.  Motor strength is 4-/5 on BUE, 4-/5 on the RLE, 1/5 on the left hip, 3/5 on the left knee, 3+/5 on the left ankle.     Incision(s): covered, clean, dry, and intact       Current Medications:  Current Facility-Administered Medications   Medication Dose Route Frequency    metFORMIN (GLUCOPHAGE) tablet 1,000 mg  1,000 mg Oral BID WITH MEALS    insulin glargine (LANTUS) injection 10 Units  10 Units SubCUTAneous QHS    albuterol (PROVENTIL HFA, VENTOLIN HFA, PROAIR HFA) inhaler 2 Puff  2 Puff Inhalation Q4H PRN    ferrous sulfate tablet 325 mg  1 Tab Oral DAILY WITH BREAKFAST    ascorbic acid (vitamin C) (VITAMIN C) tablet 250 mg  250 mg Oral DAILY WITH BREAKFAST    theophylline ER(12 hour) (THEOCHRON) tablet 200 mg  200 mg Oral Q12H    cholecalciferol (VITAMIN D3) tablet 2,000 Units  2,000 Units Oral DAILY    busPIRone (BUSPAR) tablet 20 mg  20 mg Oral TID    melatonin tablet 3 mg  3 mg Oral QHS    modafinil (PROVIGIL) tablet 100 mg  100 mg Oral DAILY    budesonide-formoterol (SYMBICORT) 160-4.5 mcg/actuation HFA inhaler 2 Puff  2 Puff Inhalation BID RT    perphenazine (TRILAFON) tablet tab 2 mg  2 mg Oral QHS    acetaminophen (TYLENOL) tablet 650 mg  650 mg Oral Q4H PRN    bisacodyl (DULCOLAX) tablet 10 mg  10 mg Oral Q48H PRN    enoxaparin (LOVENOX) injection 40 mg  40 mg SubCUTAneous Q24H    insulin lispro (HUMALOG) injection   SubCUTAneous TIDAC    albuterol (PROVENTIL VENTOLIN) nebulizer solution 2.5 mg  2.5 mg Nebulization Q4H PRN    allopurinol (ZYLOPRIM) tablet 300 mg  300 mg Oral DAILY    amLODIPine (NORVASC) tablet 5 mg  5 mg Oral DAILY    celecoxib (CELEBREX) capsule 100 mg  100 mg Oral BID WITH MEALS    loratadine (CLARITIN) tablet 10 mg  10 mg Oral DAILY    famotidine (PEPCID) tablet 20 mg  20 mg Oral BID    sertraline (ZOLOFT) tablet 100 mg  100 mg Oral DAILY    simvastatin (ZOCOR) tablet 20 mg  20 mg Oral QHS    oxyCODONE-acetaminophen (PERCOCET 7.5) 7.5-325 mg per tablet 1-2 Tab  1-2 Tab Oral Q4H PRN    glucose chewable tablet 16 g  4 Tab Oral PRN    glucagon (GLUCAGEN) injection 1 mg  1 mg IntraMUSCular PRN    dextrose (D50W) injection syrg 12.5-25 g  25-50 mL IntraVENous PRN    fentaNYL (DURAGESIC) 50 mcg/hr patch 1 Patch  1 Patch TransDERmal Q72H    therapeutic multivitamin (THERAGRAN) tablet 1 Tab  1 Tab Oral DAILY    nitroglycerin (NITROSTAT) tablet 0.4 mg  0.4 mg SubLINGual PRN    diphenhydrAMINE (BENADRYL) capsule 25 mg  25 mg Oral Q6H PRN       Allergies:  No Known Allergies      Functional Progress:    OCCUPATIONAL THERAPY    ON ADMISSION MOST RECENT   Eating  Functional Level: 5   Eating  Functional Level: 5     Grooming  Functional Level: 5   Grooming  Functional Level: 5     Bathing  Functional Level: 1 (Pt stood in shower at home.)   Bathing  Functional Level: 1 (Pt stood in shower at home.)     Upper Body Dressing  Functional Level: 5   Upper Body Dressing  Functional Level: 5     Lower Body Dressing  Functional Level: 2   Lower Body Dressing  Functional Level: 2     Toileting  Functional Level: 2   Toileting  Functional Level: 5     Toilet Transfers  Toilet Transfer Score: 0   Toilet Transfers  Toilet Transfer Score: 3     Tub /Shower Transfers  Tub/Shower Transfer Score: 0   Tub/Shower Transfers  Tub/Shower Transfer Score: 0       Legend:   7 - Independent   6 - Modified Independent   5 - Standby Assistance / Supervision / Set-up   4 - Minimum Assistance / Contact Guard Assistance   3 - Moderate Assistance   2 - Maximum Assistance   1 - Total Assistance / Dependent        Lab/Data Review:  Recent Results (from the past 24 hour(s))   GLUCOSE, POC    Collection Time: 02/13/17  4:53 PM   Result Value Ref Range    Glucose (POC) 122 (H) 70 - 110 mg/dL   GLUCOSE, POC    Collection Time: 02/13/17  8:26 PM   Result Value Ref Range    Glucose (POC) 115 (H) 70 - 110 mg/dL   GLUCOSE, POC    Collection Time: 02/14/17  7:27 AM   Result Value Ref Range    Glucose (POC) 123 (H) 70 - 110 mg/dL   GLUCOSE, POC    Collection Time: 02/14/17 11:36 AM   Result Value Ref Range    Glucose (POC) 188 (H) 70 - 110 mg/dL       Creatinine Clearance ([facility]): On admission, estimated creatinine clearance was 81.8 ml/min (based on Cr of 0.84). Most recent estimated creatinine clearance is 92.9 mL/min (based on Cr of 0.74). Assessment:     Primary Rehab Diagnosis  1. Impaired Mobility and ADLs  2. Primary osteoarthritis of the left hip  3. S/P Left total hip replacement (2/6/2017 - Dr. Ceja Husbands)     Comorbidities   Bronchial asthma    Type 2 diabetes mellitus without complication    Essential hypertension    Depression    Dyslipidemia    Decreased calculated glomerular filtration rate (GFR)    Aftercare following left hip joint replacement surgery    Acute blood loss as cause of postoperative anemia    Gout    Allergic rhinitis    Cataract of left eye    Incisional hernia    Lichen simplex chronicus    Paronychia    Chronic alcoholism    Tobacco abuse    Chronic pain syndrome    Obesity, Class III, BMI 40-49.9 (morbid obesity)     Anxiety disorder    Encounter for monitoring of theophylline therapy    Prerenal azotemia       Plan:     1. Justification for continued stay: Good progression towards established rehabilitation goals. 2. Medical Issues being followed closely:    [x]  Fall and safety precautions     [x]  Wound Care     [x]  Bowel and Bladder Function     [x]  Fluid Electrolyte and Nutrition Balance     [x]  Pain Control      3. Issues that 24 hour rehabilitation nursing is following:    [x]  Fall and safety precautions     [x]  Wound Care     [x]  Bowel and Bladder Function     [x]  Fluid Electrolyte and Nutrition Balance     [x]  Pain Control      [x]  Assistance with and education on in-room safety with transfers to and from the bed, wheelchair, toilet and shower. 4. Acute rehabilitation plan of care:    [x]  Continue current care and rehab.            [x]  Physical Therapy           [x]  Occupational Therapy           []  Speech Therapy     []  Hold Rehab until further notice     5. Medications:    [x]  MAR Reviewed     [x]  Continue Present Medications     6. DVT Prophylaxis:      [x]  Lovenox     []  Unfractionated Heparin     []  Coumadin     []  Xarelto     [x]  RUIZ Stockings     []  Sequential Compression Device     []  None     7.  Orders:   > Primary osteoarthritis of the left hip; S/P Left total hip replacement (2/6/2017 - Dr. Izabela Oreilly)   > Weightbearing as tolerated on the left lower extremity   > Total hip precautions on the left hip    > Staples to be removed on 2/20/2017     > Acute Postoperative Blood Loss Anemia   > Hgb/Hct (2/09/2017, on admission) = 9.9/29.6   > Anemia work-up showed serum iron 16, TIBC 210, iron % saturation 8, ferritin 215, reticulocyte count 1.3   > Patient was started on FeSO4 and Ascorbic acid   > Continue:    > FeSO4 325 mg PO once daily with breakfast     > Ascorbic Acid 250 mg PO once daily with breakfast (to enhance the absorption of the FeSO4)     > Anxiety / Depression   > Patient was discharged from [facility] on:    > Diazepam 10 mg PO q 6 hr PRN for anxiety    > Buspirone 30 mg PO daily    > Perphenazine 4 mg PO BID    > Sertraline 100 mg PO once daily   > Confirmed psychiatric medications from [address] [place] [address] as follows:    > Diazepam 10 mg PO TID PRN    > Buspirone 30 mg PO 4 times daily    > Perphenazine 2 mg PO q HS    > Sertraline 100 mg PO BID   > On 2/10/2017:    > Held Diazepam 10 mg PO TID PRN    > Changed Buspirone from 30 mg PO daily to 20 mg PO TID    > Decreased Perphenazine from 4 mg PO BID to 2 mg PO q HS    > Added:     > Modafinil 100 mg PO q 8AM     > Melatonin 3 mg PO q HS   > Continue:    > Buspirone 20 mg PO TID    > Perphenazine 2 mg PO q HS    > Sertraline 100 mg PO once daily    > Modafinil 100 mg PO q 8AM    > Melatonin 3 mg PO q HS    > Bronchial asthma   > On 2/09/2017, discontinued:    > Arformoterol nebulization BID    > Budesonide nebulization BID    > Theophylline level (2/09/2017) = 21.0 (N.V. = 5-15)    > Hold Theophylline  mg PO q 12 hr   > On 2/10/2017, patient was started on Symbicort 160-4.5 2 puffs inhaled BID   > Resume Theophylline ER at a decreased dose of 200 mg PO q 12 hr   > Continue:    > Symbicort 160-4.5 2 puffs inhaled BID    > Theophylline ER at a decreased dose of 200 mg PO q 12 hr    > Albuterol nebulization q 4 hr PRN for shortness of breath     > Essential hypertension   > Continue Amlodipine 5 mg PO once daily (9AM)     > Type 2 diabetes mellitus   > On 2/10/2017:    > Added Lantus 10 units SC q HS    > Increased Metformin from 500 mg to 1,000 mg PO BID with meals   > Continue:    > Lantus 10 units SC q HS    > Metformin 1,000 mg PO BID with meals    > Humalog insulin sliding scale SC TID AC only     > Prerenal azotemia    > BUN/Creatinine ratio (2/09/2017) = 30 (BUN 25, Creatinine 0.84)    > BUN/Creatinine ratio (2/13/2017) = 27 (BUN 20, Creatinine 0.74)    > Increase oral fluid intake     > Chronic pain syndrome   > Continue:    > Acetaminophen 650 mg PO q 4 hr PRN for pain level less than 5/10    > Celecoxib 100 mg PO BID PC    > Fentanyl 50 mcg/hr patch, 1 patch on skin q 72 hr     > Percocet 7.5/325 1-2 tabs PO q 4 hr PRN for breakthrough pain level greater than 4/10 (from 8PM to 4AM only)     > Difficulty sleeping    > Continue Melatonin 3 mg PO q HS   > Add Temazepam 15 mg PO q HS    > Vitamin D 25-Hydroxy (2/9/2017) = 30.2   > Patient was started on Cholecalciferol 2,000 units PO once daily       8. Patient's progress in rehabilitation and medical issues discussed with the patient. All questions answered to the best of my ability. Care plan discussed with patient and nurse.       Signed:    Pat Tabor MD    February 14, 2017

## 2017-02-14 NOTE — PROGRESS NOTES
Problem: Mobility Impaired (Adult and Pediatric)  Goal: *Acute Goals and Plan of Care (Insert Text)  Physical Therapy Short Term Goals  Initiated 2/9/2017 and to be accomplished within 7 day(s) (02/16/2017)  1. Patient will move from supine to sit and sit to supine , scoot up and down and roll side to side in bed with moderate assistance . 2. Patient will transfer from bed to chair and chair to bed with moderate assistance using the least restrictive device and safe technique. 3. Patient will perform sit to stand with minimal assistance/contact guard assist.  4. Patient will ambulate with maximal assistance for 15 feet with the least restrictive device. Physical Therapy Long Term Goals  Initiated 2/9/2017 and to be accomplished within 21 day(s) (03/02/2017)  1. Patient will move from supine to sit and sit to supine , scoot up and down and roll side to side in bed with minimal assistance/contact guard assist.   2. Patient will transfer from bed to chair and chair to bed with minimal assistance/contact guard assist using the least restrictive device. 3. Patient will perform sit to stand with supervision/set-up. 4. Patient will ambulate with moderate assistance for 50 feet with the least restrictive device. 5. Patient will ascend/descend 4 stairs with 2 handrail(s) with moderate assistance . PHYSICAL THERAPY DAILY NOTE  Patient Name:Latasha Rashid  Time In: 1200  Time Out: 5372  Time In: 0710  Time Out: 3622  Patient Seen For: Wheelchair mobility;Transfer training;Gait training;Balance activities  Diagnosis: Left hip fracture  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  Precautions: Fall Risk; WBAT L LE     Subjective: AM: Patient initially refuses treatment, PT able to achieve consent and participation after 30 min 2/2 patient reports of pain and having a \"rough night\". PM: Patient reports that she does not want to participate in PM session 2/2 soreness. Pain at start of tx:10/10  Pain at stop of tx:8/10     Patient identified with name and : YES         Objective: Patient participates in recreational therapy lead treatment with another patient present; PT present to challenge static standing balance, static standing tolerance, and weight shift tolerance. Patient participates in activity for 4'45\", 3'30\", 5'00\", 4'30\" respectively reporting fatigue and discomfort in B LE's as the reason for returning to sitting. Patient also performs 12 ft ambulation with min a x 1 w/ assistance as noted below to transition from w/c to bed. BED/MAT MOBILITY Daily Assessment     Rolling Right : 2 (Maximal assistance)  Rolling Left : 4 (Minimal assistance)  Supine to Sit : 3 (Moderate assistance) (L LE and trunk assist with HOB elevated to ~ 20 deg)          TRANSFERS Daily Assessment     Transfer Type: Other  Other: Patient participates in stand step transfer requiring Min a x 1 for STS transfer requiring boost and verbal cues for B UE placement. Patient requires B UE support on PT during step portion of transfer with verbal cueing for sequence and safety. Patient demonstrates tendency toward reaching to grab target surface prior to proper body positioning. Patient performs stand to sit transition w/o requirement of physical assistance  Transfer Assistance : 4 (Minimal assistance)  Sit to Stand Assistance: Minimal assistance (initial STS Kevin x 1 for boost; final 2 transfers CGA)          GAIT Daily Assessment     Amount of Assistance: 4 (Minimal assistance) (B UE support; weight shift assist)  Distance (ft): 45 Feet (ft)  Assistive Device: Gait belt (anterior approach from PT)     Patient performs ambulation with mild B circumduction; facilitated weight shift; trunk flexion prevention; and verbal encouragement for tolerance and sequence.            BALANCE Daily Assessment     Sitting - Static: Good (unsupported)  Sitting - Dynamic: Good (unsupported)  Standing - Static: Fair  Standing - Dynamic : Impaired          WHEELCHAIR MOBILITY Daily Assessment     Able to Propel (ft): 150 feet  Functional Level: 6 (B UE technique; additional time required)              Assessment: Patient demonstrates significant improvement in tolerance for ambulation despite hesitancy toward therapy session. Plan of Care: Cont current POC; focus on continued mobility challenge     Ole Riggs, PT DPT  2/14/2017

## 2017-02-14 NOTE — PROGRESS NOTES
Problem: Self Care Deficits Care Plan (Adult)  Goal: *Therapy Goal (Edit Goal, Insert Text)  Long Term Goals (to be met upon discharge date) in order to increase pts functional independence and safety, and decrease burden of care:  1. Pt will perform self-feeding with modified independence. 2. Pt will perform grooming with modified independence. 3. Pt will perform UB bathing with modified independence. 4. Pt will perform LB bathing with modified independence. 5. Pt will perform shower transfer with supervision. 6. Pt will perform UB dressing with independence. 7. Pt will perform LB dressing with modified independence. 8. Pt will perform toileting task with modified independence. 9. Pt will perform toilet transfer with modified independence. 10. Pt will perform an IADL task while standing with supervision. Short Term Weekly Goals for (17 to 17) in order to increase pts functional independence and safety, and decrease burden of care:  1. Pt will perform self-feeding with modified independence. 2. Pt will perform grooming with modified independence. 3. Pt will perform UB bathing with modified independence. 4. Pt will perform LB bathing with min A.  5. Pt will perform shower transfer with min A.  6. Pt will perform UB dressing with independence. 7. Pt will perform LB dressing with min A.  8. Pt will perform toileting task with min A.  9. Pt will perform toilet transfer with min A.   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 My knees hurt so bad last night when they (the nurse) take off the cast it was swollen all round it.  Everything on me hurt, both of my shoulders'     Objective: Pt seen supine in bed upon arrival in room for AM shower, see below for ADL's functional levels. Second session: Pt decline treatment 2/2 pain all over per patient 10/10. Pt -2 units         FEEDING/EATING Daily Assessment     Feeding/Eating  Feeding/Eating Assistance: 7 (Independent)       GROOMING Daily Assessment     Grooming  Grooming Assistance : 7 (Independent)       UPPER BODY BATHING Daily Assessment     Upper Body Bathing  Bathing Assistance, Upper: 6 (Modified independent)  Position Performed: Seated in chair washing hair and upper body. LOWER BODY BATHING Daily Assessment     Lower Body Bathing  Bathing Assistance, Lower : 5 (Stand-by assistance)  Adaptive Equipment: Grab bar;Long handled sponge  Position Performed: Seated in chair;Standing  Comments: Pt used LH sponge for LE from seated position, req'd min assist to wash between toes 2/2 hip precaution. Then stood with and support self on grab bars while washing carmine area and buttocks independently, SBA        TOILETING Daily Assessment     Toileting  Toileting Assistance (FIM Score): 6 (Modified independent with CM and hygiene)  Adaptive Equipment: Grab bars       UPPER BODY DRESSING Daily Assessment     Upper Body Dressing   Dressing Assistance : 6 (Modified independent)  Comments: From seated position pt nany sport bra and T- shirt independently       LOWER BODY DRESSING Daily Assessment     Lower Body Dressing   Dressing Assistance : 4 (Minimal assistance)  Leg Crossed Method Used: No  Position Performed: Seated in chair;Standing  Adaptive Equipment Used: Reacher;Sock aid; Walker  Comments: Re-ed with reacher to assist with threading L LE into pants legs. Then donning sock with sock aid, stood with rolling walker donning panties and pants over hips/ buttocks independent while alternating hands on walker,CGA with standing.         MOBILITY/TRANSFERS Daily Assessment     Functional Transfers  Toilet Transfer : Grab bars;Stand pivot transfer without device  Amount of Assistance Required: 4 (Contact guard assistance)     Tub or Shower Type: Shower  Amount of Assistance Required: 4 (Contact guard assistance with t/f to and from wheel chair to shower bench,min verbal cuing for proper hand placement with sit to stand.)  Adaptive Equipment: Grab bars      Assessment: Pt demonstrates decrease safety awareness with transfers. Pt demonstrates independency with self care. Plan of Care: Con't plan of care to maximize functional activities with ADL's.      Yonathan DELGADO  2/14/2017

## 2017-02-14 NOTE — PROGRESS NOTES
Problem: Self Care Deficits Care Plan (Adult)  Goal: *Therapy Goal (Edit Goal, Insert Text)  Long Term Goals (to be met upon discharge date) in order to increase pts functional independence and safety, and decrease burden of care:  1. Pt will perform self-feeding with modified independence. 2. Pt will perform grooming with modified independence. 3. Pt will perform UB bathing with modified independence. 4. Pt will perform LB bathing with modified independence. 5. Pt will perform shower transfer with supervision. 6. Pt will perform UB dressing with independence. 7. Pt will perform LB dressing with modified independence. 8. Pt will perform toileting task with modified independence. 9. Pt will perform toilet transfer with modified independence. 10. Pt will perform an IADL task while standing with supervision. Short Term Weekly Goals for (17 to 17) in order to increase pts functional independence and safety, and decrease burden of care:  1. Pt will perform self-feeding with modified independence. 2. Pt will perform grooming with modified independence. 3. Pt will perform UB bathing with modified independence. 4. Pt will perform LB bathing with min A.  5. Pt will perform shower transfer with min A.  6. Pt will perform UB dressing with independence. 7. Pt will perform LB dressing with min A.  8. Pt will perform toileting task with min A.  9. Pt will perform toilet transfer with min A.   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       Objective:      THERAPEUTIC ACTIVITY Daily Assessment     Pt participated with Connect 4 with unsupportive sitting with one pound wrist weight. Pt demonstrates UE flexion / extension for strengthening. THERAPEUTIC EXERCISE Daily Assessment     Thera exercise with 3 pound dowel, preforming 3x10 reps in all plane with rest between sets 2/2 fatigue. TOILETING Daily Assessment     Toileting  Toileting Assistance (FIM Score): 6 (Modified independent) Pt independent with CM and hygiene  Adaptive Equipment: Grab bars          MOBILITY/TRANSFERS Daily Assessment     Functional Transfers  Toilet Transfer : Grab bars;Stand pivot transfer without device, pt push up from wheel chair then using the arms on 3:1 commode for assist with stand pivot to commode. Amount of Assistance Required: 4 (Contact guard assistance)  . Assessment: Pt req'd frequent rest break 2/2 fatigue. Pt demonstrate independency with toileting tasks. Plan of Care: Con't plan of care with UE strengthening and standing tolerance.       Lorri Hernandez  2/13/2017

## 2017-02-14 NOTE — PROGRESS NOTES
Problem: Mobility Impaired (Adult and Pediatric)  Goal: *Acute Goals and Plan of Care (Insert Text)  Physical Therapy Short Term Goals  Initiated 2/9/2017 and to be accomplished within 7 day(s) (02/16/2017)  1. Patient will move from supine to sit and sit to supine , scoot up and down and roll side to side in bed with moderate assistance . 2. Patient will transfer from bed to chair and chair to bed with moderate assistance using the least restrictive device and safe technique. 3. Patient will perform sit to stand with minimal assistance/contact guard assist.  4. Patient will ambulate with maximal assistance for 15 feet with the least restrictive device. Physical Therapy Long Term Goals  Initiated 2/9/2017 and to be accomplished within 21 day(s) (03/02/2017)  1. Patient will move from supine to sit and sit to supine , scoot up and down and roll side to side in bed with minimal assistance/contact guard assist.   2. Patient will transfer from bed to chair and chair to bed with minimal assistance/contact guard assist using the least restrictive device. 3. Patient will perform sit to stand with supervision/set-up. 4. Patient will ambulate with moderate assistance for 50 feet with the least restrictive device. 5. Patient will ascend/descend 4 stairs with 2 handrail(s) with moderate assistance . PT attempt:      Patient initially refuses treatment stating \"I just hurt all over, it was such a bad night. I could not sleep and I am so sore from yesterdays therapy. I am not trying to get out of anything I just need a little time. Come back in a half an hour. \" Patient refuses initial session today despite max encouragement. Will follow and re-attempt as appropriate.         Jesse Fierro PT DPT  02/14/2017 ([id number])

## 2017-02-14 NOTE — ROUTINE PROCESS
SHIFT CHANGE NOTE FOR [facility]    Bedside and Verbal shift change report given to Mel Howard RN (oncoming nurse) by Bing Vieira RN LPN   (offgoing nurse). Report included the following information SBAR, Kardex, MAR and Recent Results.     Situation:   Code Status: Full Code   Reason for Admission: left hip replacement  Hospital Day: 5   Problem List:   Hospital Problems  Date Reviewed: 2/13/2017          Codes Class Noted POA    Encounter for monitoring of theophylline therapy ICD-10-CM: Z51.81, Z79.899  ICD-9-CM: V58.83, V58.69  2/9/2017 Yes    Overview Signed 2/9/2017 12:35 PM by Mac Mccurdy MD     Theophylline level (2/09/2017) = 21.0             Prerenal azotemia ICD-10-CM: R79.89  ICD-9-CM: 790.6  2/9/2017 Yes    Overview Signed 2/9/2017 12:35 PM by Mac Mccurdy MD     BUN/Creatinine ratio (2/09/2017) = 30 (BUN 25, Creatinine 0.84)              Chronic pain syndrome (Chronic) ICD-10-CM: G89.4  ICD-9-CM: 338.4  Unknown Yes        Acute blood loss as cause of postoperative anemia ICD-10-CM: D62  ICD-9-CM: 285.1  2/7/2017 Yes        Primary osteoarthritis of left hip (Chronic) ICD-10-CM: M16.12  ICD-9-CM: 715.15  Unknown Yes        Type 2 diabetes mellitus without complication (HCC) (Chronic) ICD-10-CM: E11.9  ICD-9-CM: 250.00  Unknown Yes        Essential hypertension (Chronic) ICD-10-CM: I10  ICD-9-CM: 401.9  Unknown Yes        Decreased calculated glomerular filtration rate (GFR) ICD-10-CM: R94.4  ICD-9-CM: 794.4  2/6/2017 Yes    Overview Signed 2/8/2017  5:12 PM by Mac Mccurdy MD     Calculated GFR is equivalent to that of CKD stage 2 = 60-89 ml/min             * (Principal)Status post total replacement of left hip ICD-10-CM: U90.072  ICD-9-CM: V43.64  2/6/2017 Yes    Overview Signed 2/8/2017  5:13 PM by MD Dr. Felton Berg             Aftercare following left hip joint replacement surgery ICD-10-CM: Z47.1, A39.456  ICD-9-CM: V54.81, V43.64  2/6/2017 Yes    Overview Signed 2/8/2017  5:13 PM by Cari Yin MD     S/P Left total hip replacement (2/6/2017 - Dr. Tom Perkins)                   Background:   Past Medical History:   Past Medical History   Diagnosis Date    Acute blood loss as cause of postoperative anemia 2/7/2017    Allergic rhinitis     Anxiety disorder     Bronchial asthma     Cataract of left eye     Chronic alcoholism ([facility] [address]     Chronic pain syndrome     Decreased calculated glomerular filtration rate (GFR) 2/6/2017     Calculated GFR is equivalent to that of CKD stage 2 = 60-89 ml/min    Depression     Dyslipidemia     Essential hypertension     Gout     History of tobacco abuse     Incisional hernia     Lichen simplex chronicus     Obesity, Class III, BMI 40-49.9 (morbid obesity) ([facility] [address]     Paronychia     Primary osteoarthritis of left hip     Type 2 diabetes mellitus without complication ([facility] [address]       Patient taking anticoagulants yes    Patient has a defibrillator: no     Assessment:   Changes in Assessment throughout shift: No     Patient has central line: no Reasons if yes: Insertion date: Last dressing date:   Patient has Villatoro Cath: no Reasons if yes:     Insertion date:     Last Vitals:     Vitals:    02/12/17 1930 02/13/17 0735 02/13/17 1658 02/1955   BP: 139/72 140/77 151/80 155/86   Pulse: 88 81 85 87   Resp: 24 20 20 20   Temp: 98.3 °F (36.8 °C) 97 °F (36.1 °C) 96.8 °F (36 °C) 99 °F (37.2 °C)   SpO2: 93% 93% 93% 92%   Weight:       Height:            PAIN    Pain Assessment    Pain Intensity 1: 0 (02/13/17 1847) Pain Intensity 1: 2 (12/29/14 1105)    Pain Location 1: Back Pain Location 1: Abdomen    Pain Intervention(s) 1: Medication (see MAR) Pain Intervention(s) 1: Medication (see MAR)  Patient Stated Pain Goal: 0 Patient Stated Pain Goal: 0  o Intervention effective: yes    o Other actions taken for pain: No     Skin Assessment  Skin color Skin Color: Appropriate for ethnicity  Condition/Temperature Skin Condition/Temp: Dry, Warm  Integrity Skin Integrity: Incision (comment)  Turgor Turgor: Non-tenting  Weekly Pressure Ulcer Documentation Weekly Pressure Ulcer Documentation: No Pressure Ulcer Noted-Pressure Ulcer Prevention Initiated  Wound Prevention & Protection Methods  Orientation of wound Orientation of Wound Prevention: Posterior  Location of Prevention Location of Wound Prevention: Sacrum/Coccyx  Dressing Present Dressing Present : No  Dressing Status    Wound Offloading Wound Offloading (Prevention Methods): Bed, pressure reduction mattress     INTAKE/OUPUT    Date 02/12/17 1900 - 02/13/17 0659 02/13/17 0700 - 02/14/17 0659   Shift 7876-8976 24 Hour Total 3251-0610 0546-2410 24 Hour Total   I  N  T  A  K  E   P.O.  480 1020  1020      P. O.  480 1020  1020    Shift Total  (mL/kg)  480  (4.5) 1020  (9.7)  1020  (9.7)   O  U  T  P  U  T   Urine  (mL/kg/hr)           Urine Occurrence(s) 2 x 2 x 4 x  4 x    Stool           Stool Occurrence(s) 1 x 1 x 1 x  1 x    Shift Total  (mL/kg)        NET  480 1020  1020   Weight (kg) 105.7 105.7 105.7 105.7 105.7       Recommendations:  1. Patient needs and requests: education    2. Diet: diabetic    3. Pending tests/procedures: No     4. Functional Level/Equipment: 1 x person assist    5. Estimated Discharge Date: TDB Posted on Whiteboard in Patients Room: [facility] Safety Check    A safety check occurred in the patient's room between off going nurse and oncoming nurse listed above.     The safety check included the below items  Area Items   H  High Alert Medications - Verify all high alert medication drips (heparin, PCA, etc.)   E  Equipment - Suction is set up for ALL patients (with yanker)  - Red plugs utilized for all equipment (IV pumps, etc.)  - WOWs wiped down at end of shift.  - Room stocked with oxygen, suction, and other unit-specific supplies   A  Alarms - Bed alarm is set for fall risk patients  - Ensure chair alarm is in place and activated if patient is up in a chair L  Lines - Check IV for any infiltration  - Villatoro bag is empty if patient has a Villatoro   - Tubing and IV bags are labeled   S  Safety   - Room is clean, patient is clean, and equipment is clean. - Hallways are clear from equipment besides carts. - Fall bracelet on for fall risk patients  - Ensure room is clear and free of clutter  - Suction is set up for ALL patients (with yanker)  - Hallways are clear from equipment besides carts.    - Isolation precautions followed, supplies available outside room, sign posted     Harris Everett LPN

## 2017-02-15 LAB
GLUCOSE BLD STRIP.AUTO-MCNC: 107 MG/DL (ref 70–110)
GLUCOSE BLD STRIP.AUTO-MCNC: 123 MG/DL (ref 70–110)
GLUCOSE BLD STRIP.AUTO-MCNC: 127 MG/DL (ref 70–110)
GLUCOSE BLD STRIP.AUTO-MCNC: 131 MG/DL (ref 70–110)

## 2017-02-15 PROCEDURE — 65310000000 HC RM PRIVATE REHAB

## 2017-02-15 PROCEDURE — 97530 THERAPEUTIC ACTIVITIES: CPT

## 2017-02-15 PROCEDURE — 74011250637 HC RX REV CODE- 250/637: Performed by: INTERNAL MEDICINE

## 2017-02-15 PROCEDURE — 82962 GLUCOSE BLOOD TEST: CPT

## 2017-02-15 PROCEDURE — 97535 SELF CARE MNGMENT TRAINING: CPT

## 2017-02-15 PROCEDURE — 97116 GAIT TRAINING THERAPY: CPT

## 2017-02-15 PROCEDURE — 74011250636 HC RX REV CODE- 250/636: Performed by: INTERNAL MEDICINE

## 2017-02-15 PROCEDURE — 97110 THERAPEUTIC EXERCISES: CPT

## 2017-02-15 RX ADMIN — OXYCODONE HYDROCHLORIDE AND ACETAMINOPHEN 1 TABLET: 7.5; 325 TABLET ORAL at 06:28

## 2017-02-15 RX ADMIN — BUDESONIDE AND FORMOTEROL FUMARATE DIHYDRATE 2 PUFF: 160; 4.5 AEROSOL RESPIRATORY (INHALATION) at 20:26

## 2017-02-15 RX ADMIN — CELECOXIB 100 MG: 100 CAPSULE ORAL at 09:34

## 2017-02-15 RX ADMIN — FAMOTIDINE 20 MG: 20 TABLET ORAL at 17:17

## 2017-02-15 RX ADMIN — SITAGLIPTIN 100 MG: 50 TABLET, FILM COATED ORAL at 17:17

## 2017-02-15 RX ADMIN — METFORMIN HYDROCHLORIDE 1000 MG: 500 TABLET, FILM COATED ORAL at 09:33

## 2017-02-15 RX ADMIN — ALBUTEROL SULFATE 2 PUFF: 90 AEROSOL, METERED RESPIRATORY (INHALATION) at 09:33

## 2017-02-15 RX ADMIN — VITAMIN D, TAB 1000IU (100/BT) 2000 UNITS: 25 TAB at 09:33

## 2017-02-15 RX ADMIN — THERA TABS 1 TABLET: TAB at 09:34

## 2017-02-15 RX ADMIN — ENOXAPARIN SODIUM 40 MG: 100 INJECTION SUBCUTANEOUS at 06:24

## 2017-02-15 RX ADMIN — TEMAZEPAM 15 MG: 15 CAPSULE ORAL at 21:05

## 2017-02-15 RX ADMIN — MELATONIN TAB 3 MG 3 MG: 3 TAB at 20:28

## 2017-02-15 RX ADMIN — THEOPHYLLINE 200 MG: 200 TABLET, EXTENDED RELEASE ORAL at 21:05

## 2017-02-15 RX ADMIN — MODAFINIL 100 MG: 100 TABLET ORAL at 09:34

## 2017-02-15 RX ADMIN — BUSPIRONE HYDROCHLORIDE 20 MG: 5 TABLET ORAL at 21:38

## 2017-02-15 RX ADMIN — LORATADINE 10 MG: 10 TABLET ORAL at 09:34

## 2017-02-15 RX ADMIN — BUSPIRONE HYDROCHLORIDE 20 MG: 5 TABLET ORAL at 09:34

## 2017-02-15 RX ADMIN — OXYCODONE HYDROCHLORIDE AND ACETAMINOPHEN 2 TABLET: 7.5; 325 TABLET ORAL at 20:26

## 2017-02-15 RX ADMIN — METFORMIN HYDROCHLORIDE 1000 MG: 500 TABLET, FILM COATED ORAL at 17:17

## 2017-02-15 RX ADMIN — BUSPIRONE HYDROCHLORIDE 20 MG: 5 TABLET ORAL at 17:17

## 2017-02-15 RX ADMIN — BUDESONIDE AND FORMOTEROL FUMARATE DIHYDRATE 2 PUFF: 160; 4.5 AEROSOL RESPIRATORY (INHALATION) at 09:43

## 2017-02-15 RX ADMIN — AMLODIPINE BESYLATE 5 MG: 5 TABLET ORAL at 09:34

## 2017-02-15 RX ADMIN — OXYCODONE HYDROCHLORIDE AND ACETAMINOPHEN 2 TABLET: 7.5; 325 TABLET ORAL at 14:17

## 2017-02-15 RX ADMIN — Medication 325 MG: at 09:34

## 2017-02-15 RX ADMIN — CELECOXIB 100 MG: 100 CAPSULE ORAL at 17:17

## 2017-02-15 RX ADMIN — SERTRALINE HYDROCHLORIDE 100 MG: 50 TABLET ORAL at 09:34

## 2017-02-15 RX ADMIN — THEOPHYLLINE 200 MG: 200 TABLET, EXTENDED RELEASE ORAL at 09:33

## 2017-02-15 RX ADMIN — ALLOPURINOL 300 MG: 100 TABLET ORAL at 09:34

## 2017-02-15 RX ADMIN — SIMVASTATIN 20 MG: 20 TABLET, FILM COATED ORAL at 21:04

## 2017-02-15 RX ADMIN — FAMOTIDINE 20 MG: 20 TABLET ORAL at 09:33

## 2017-02-15 RX ADMIN — Medication 250 MG: at 09:34

## 2017-02-15 RX ADMIN — PERPHENAZINE 2 MG: 2 TABLET, FILM COATED ORAL at 21:38

## 2017-02-15 NOTE — PROGRESS NOTES
[address]  [address] [place] [address], [address]     INPATIENT REHABILITATION  DAILY PROGRESS NOTE     Date: 2/15/2017    Name: Rashel Parra Age / Sex: 64 y.o. / female   CSN: [id number] MRN: [id number]   [facility] Date: 2/8/2017 Length of Stay: 7 days     Primary Rehab Diagnosis: Impaired Mobility and ADLs secondary to:  1. Primary osteoarthritis of the left hip  2. S/P Left total hip replacement (2/6/2017 - Dr. Tom Perkins)      Subjective:     Patient seen and examined. Blood pressure controlled. Blood sugars controlled. Team conference was held at bedside this AM.     Patient's Complaint:   Has problem sleeping    Pain Control: stable, mild-to-moderate joint symptoms intermittently, reasonably well controlled by current meds      Objective:     Vital Signs:  Patient Vitals for the past 24 hrs:   BP Temp Pulse Resp SpO2   02/15/17 0723 130/74 97.9 °F (36.6 °C) 79 20 96 %   02/14/17 1930 138/73 97.2 °F (36.2 °C) 87 22 95 %   02/14/17 1621 133/79 97.5 °F (36.4 °C) 88 20 93 %        Physical Exam:  GENERAL SURVEY: Patient is awake, alert, oriented x 3, sitting comfortably on the chair, not in acute respiratory distress. HEENT: pale palpebral conjunctivae, anicteric sclerae, no nasoaural discharge, moist oral mucosa  NECK: supple, no jugular venous distention, no palpable lymph nodes  CHEST/LUNGS: symmetrical chest expansion, good air entry, clear breath sounds  HEART: adynamic precordium, good S1 S2, no S3, regular rhythm, no murmurs  ABDOMEN: obese, bowel sounds appreciated, soft, non-tender  EXTREMITIES: pale nailbeds, no edema, full and equal pulses, no calf tenderness   NEUROLOGICAL EXAM: The patient is awake, alert and oriented x3, able to answer questions fairly appropriately, able to follow 1 and 2 step commands. Able to tell time from the wall clock. Cranial nerves II-XII are grossly intact. No gross sensory deficit.  Motor strength is 4-/5 on BUE, 4-/5 on the RLE, 1/5 on the left hip, 3/5 on the left knee, 3+/5 on the left ankle.     Incision(s): covered, clean, dry, and intact       Current Medications:  Current Facility-Administered Medications   Medication Dose Route Frequency    temazepam (RESTORIL) capsule 15 mg  15 mg Oral QHS    metFORMIN (GLUCOPHAGE) tablet 1,000 mg  1,000 mg Oral BID WITH MEALS    insulin glargine (LANTUS) injection 10 Units  10 Units SubCUTAneous QHS    albuterol (PROVENTIL HFA, VENTOLIN HFA, PROAIR HFA) inhaler 2 Puff  2 Puff Inhalation Q4H PRN    ferrous sulfate tablet 325 mg  1 Tab Oral DAILY WITH BREAKFAST    ascorbic acid (vitamin C) (VITAMIN C) tablet 250 mg  250 mg Oral DAILY WITH BREAKFAST    theophylline ER(12 hour) (THEOCHRON) tablet 200 mg  200 mg Oral Q12H    cholecalciferol (VITAMIN D3) tablet 2,000 Units  2,000 Units Oral DAILY    busPIRone (BUSPAR) tablet 20 mg  20 mg Oral TID    melatonin tablet 3 mg  3 mg Oral QHS    modafinil (PROVIGIL) tablet 100 mg  100 mg Oral DAILY    budesonide-formoterol (SYMBICORT) 160-4.5 mcg/actuation HFA inhaler 2 Puff  2 Puff Inhalation BID RT    perphenazine (TRILAFON) tablet tab 2 mg  2 mg Oral QHS    acetaminophen (TYLENOL) tablet 650 mg  650 mg Oral Q4H PRN    bisacodyl (DULCOLAX) tablet 10 mg  10 mg Oral Q48H PRN    enoxaparin (LOVENOX) injection 40 mg  40 mg SubCUTAneous Q24H    insulin lispro (HUMALOG) injection   SubCUTAneous TIDAC    albuterol (PROVENTIL VENTOLIN) nebulizer solution 2.5 mg  2.5 mg Nebulization Q4H PRN    allopurinol (ZYLOPRIM) tablet 300 mg  300 mg Oral DAILY    amLODIPine (NORVASC) tablet 5 mg  5 mg Oral DAILY    celecoxib (CELEBREX) capsule 100 mg  100 mg Oral BID WITH MEALS    loratadine (CLARITIN) tablet 10 mg  10 mg Oral DAILY    famotidine (PEPCID) tablet 20 mg  20 mg Oral BID    sertraline (ZOLOFT) tablet 100 mg  100 mg Oral DAILY    simvastatin (ZOCOR) tablet 20 mg  20 mg Oral QHS    oxyCODONE-acetaminophen (PERCOCET 7.5) 7.5-325 mg per tablet 1-2 Tab  1-2 Tab Oral Q4H PRN    glucose chewable tablet 16 g  4 Tab Oral PRN    glucagon (GLUCAGEN) injection 1 mg  1 mg IntraMUSCular PRN    dextrose (D50W) injection syrg 12.5-25 g  25-50 mL IntraVENous PRN    fentaNYL (DURAGESIC) 50 mcg/hr patch 1 Patch  1 Patch TransDERmal Q72H    therapeutic multivitamin (THERAGRAN) tablet 1 Tab  1 Tab Oral DAILY    nitroglycerin (NITROSTAT) tablet 0.4 mg  0.4 mg SubLINGual PRN    diphenhydrAMINE (BENADRYL) capsule 25 mg  25 mg Oral Q6H PRN       Allergies:  No Known Allergies      Functional Progress:    PHYSICAL THERAPY    ON ADMISSION MOST RECENT   Wheelchair Mobility/Management  Able to Propel (ft): 120 feet  Functional Level: 2 (SPV w/ B UE technique and LE for steering assist)  Curbs/Ramps Assist Required (FIM Score): 0 (Not tested)  Wheelchair Setup Assist Required : 1 (Dependent/total assistance)  Wheelchair Management: Manages left brake, Manages right brake Wheelchair Mobility/Management  Able to Propel (ft): 150 feet  Functional Level: 5  Curbs/Ramps Assist Required (FIM Score): 0 (Not tested)  Wheelchair Setup Assist Required : 1 (Dependent/total assistance)  Wheelchair Management: Manages left brake, Manages right brake     Gait  Amount of Assistance:  (Pt unable)  Distance (ft): 0 Feet (ft)  Assistive Device:  (N/A) Gait  Amount of Assistance: 4 (Minimal assistance) (B UE support; weight shift assist)  Distance (ft): 45 Feet (ft)  Assistive Device: Gait belt (anterior approach from PT)     Balance-Sitting/Standing  Sitting - Static: Fair (occasional)  Sitting - Dynamic: Fair (occasional)  Standing - Static: Fair  Standing - Dynamic : Impaired Balance-Sitting/Standing  Sitting - Static: Good (unsupported)  Sitting - Dynamic: Good (unsupported)  Standing - Static: Fair  Standing - Dynamic : Impaired     Bed/Mat Mobility  Rolling Right : 2 (Maximal assistance)  Rolling Left : 1 (Total assistance) (unable to achieve full)  Supine to Sit : 1 (Total assistance) (requires trunk and LE assist with HOB elevate >30 deg)  Sit to Supine : 2 (Maximal assistance) (Trunk and B LE assistance) Bed/Mat Mobility  Rolling Right : 2 (Maximal assistance)  Rolling Left : 4 (Minimal assistance)  Supine to Sit : 3 (Moderate assistance) (L LE and trunk assist with HOB elevated to ~ 20 deg)  Sit to Supine : 2 (Maximal assistance) (Trunk and B LE assistance)     Transfers  Transfer Type: Other  Other: Patient performs stand step transfer requiring mod a x1 to assist with upright posture, B UE support, weight shift assistance, assisted pivot, controlled lowering, and tactile cueing to improve body position. Patient un-receptive to verbal cueing or assistance to improve form/technique. Patient demosntrates standing squat form with combination pivot and lowering to target surface, consistently reaching for external support to pull-to-stand with STS transition and stepping during pivot/step phase of transfer. Patient nont-reeptive to verbal cues to prevent reaching. patient with continued lack of recptiveness to education t/o final 3 transfers in session. Transfer Assistance : 3 (Moderate assistance )  Sit to Stand Assistance: Moderate assistance  Car Transfers: Not tested  Car Type: N/A Transfers  Transfer Type: Other  Other: Patient participates in stand step transfer requiring Min a x 1 for STS transfer requiring boost and verbal cues for B UE placement. Patient requires B UE support on PT during step portion of transfer with verbal cueing for sequence and safety. Patient demosntrates tendency toward reaching to grab target surface prior to proper body positioning.  Patient performs stand to sit transition w/o requirement of physical assistance  Transfer Assistance : 4 (Minimal assistance)  Sit to Stand Assistance: Minimal assistance (initial STS Kevin x 1 for boost; final 2 transfers CGA)  Car Transfers: Not tested  Car Type: N/A     Steps or Stairs  Steps/Stairs Ambulated (#): 0  Level of Assist : 0 (Not tested)  Rail Use:  (N/A) Steps or Stairs  Steps/Stairs Ambulated (#): 0  Level of Assist : 0 (Not tested)  Rail Use:  (N/A)         Lab/Data Review:  Recent Results (from the past 24 hour(s))   GLUCOSE, POC    Collection Time: 02/14/17  3:10 PM   Result Value Ref Range    Glucose (POC) 155 (H) 70 - 110 mg/dL   GLUCOSE, POC    Collection Time: 02/14/17  8:41 PM   Result Value Ref Range    Glucose (POC) 113 (H) 70 - 110 mg/dL   GLUCOSE, POC    Collection Time: 02/15/17  7:55 AM   Result Value Ref Range    Glucose (POC) 131 (H) 70 - 110 mg/dL   GLUCOSE, POC    Collection Time: 02/15/17 12:02 PM   Result Value Ref Range    Glucose (POC) 123 (H) 70 - 110 mg/dL       Creatinine Clearance (Kate Baker): On admission, estimated creatinine clearance was 81.8 ml/min (based on Cr of 0.84). Most recent estimated creatinine clearance is 92.9 mL/min (based on Cr of 0.74). Assessment:     Primary Rehab Diagnosis  1. Impaired Mobility and ADLs  2. Primary osteoarthritis of the left hip  3. S/P Left total hip replacement (2/6/2017 - Dr. Tom Perkins)     Comorbidities   Bronchial asthma    Type 2 diabetes mellitus without complication    Essential hypertension    Depression    Dyslipidemia    Decreased calculated glomerular filtration rate (GFR)    Aftercare following left hip joint replacement surgery    Acute blood loss as cause of postoperative anemia    Gout    Allergic rhinitis    Cataract of left eye    Incisional hernia    Lichen simplex chronicus    Paronychia    Chronic alcoholism    Tobacco abuse    Chronic pain syndrome    Obesity, Class III, BMI 40-49.9 (morbid obesity)     Anxiety disorder    Encounter for monitoring of theophylline therapy    Prerenal azotemia       Plan:     1. Justification for continued stay: Good progression towards established rehabilitation goals.     2. Medical Issues being followed closely:    [x]  Fall and safety precautions     [x]  Wound Care [x]  Bowel and Bladder Function     [x]  Fluid Electrolyte and Nutrition Balance     [x]  Pain Control      3. Issues that 24 hour rehabilitation nursing is following:    [x]  Fall and safety precautions     [x]  Wound Care     [x]  Bowel and Bladder Function     [x]  Fluid Electrolyte and Nutrition Balance     [x]  Pain Control      [x]  Assistance with and education on in-room safety with transfers to and from the bed, wheelchair, toilet and shower. 4. Acute rehabilitation plan of care:    [x]  Continue current care and rehab. [x]  Physical Therapy           [x]  Occupational Therapy           []  Speech Therapy     []  Hold Rehab until further notice     5. Medications:    [x]  MAR Reviewed     [x]  Continue Present Medications     6. DVT Prophylaxis:      [x]  Lovenox     []  Unfractionated Heparin     []  Coumadin     []  Xarelto     [x]  RUIZ Stockings     []  Sequential Compression Device     []  None     7.  Orders:   > Primary osteoarthritis of the left hip; S/P Left total hip replacement (2/6/2017 - Dr. Coralee Osgood)   > Weightbearing as tolerated on the left lower extremity   > Total hip precautions on the left hip    > Staples to be removed on 2/20/2017     > Acute Postoperative Blood Loss Anemia   > Hgb/Hct (2/09/2017, on admission) = 9.9/29.6   > Anemia work-up showed serum iron 16, TIBC 210, iron % saturation 8, ferritin 215, reticulocyte count 1.3   > Patient was started on FeSO4 and Ascorbic acid   > Continue:    > FeSO4 325 mg PO once daily with breakfast     > Ascorbic Acid 250 mg PO once daily with breakfast (to enhance the absorption of the FeSO4)     > Anxiety / Depression   > Patient was discharged from [address] [place] [address] on:    > Diazepam 10 mg PO q 6 hr PRN for anxiety    > Buspirone 30 mg PO daily    > Perphenazine 4 mg PO BID    > Sertraline 100 mg PO once daily   > Confirmed psychiatric medications from [facility] as follows:    > Diazepam 10 mg PO TID PRN    > Buspirone 30 mg PO 4 times daily    > Perphenazine 2 mg PO q HS    > Sertraline 100 mg PO BID   > On 2/10/2017:    > Held Diazepam 10 mg PO TID PRN    > Changed Buspirone from 30 mg PO daily to 20 mg PO TID    > Decreased Perphenazine from 4 mg PO BID to 2 mg PO q HS    > Added:     > Modafinil 100 mg PO q 8AM     > Melatonin 3 mg PO q HS   > Continue:    > Buspirone 20 mg PO TID    > Perphenazine 2 mg PO q HS    > Sertraline 100 mg PO once daily    > Melatonin 3 mg PO q HS   > Discontinue Modafinil 100 mg PO q 8AM    > Bronchial asthma   > On 2/09/2017, discontinued:    > Arformoterol nebulization BID    > Budesonide nebulization BID    > Theophylline level (2/09/2017) = 21.0 (N.V. = 5-15)    > Hold Theophylline  mg PO q 12 hr   > On 2/10/2017, patient was started on Symbicort 160-4.5 2 puffs inhaled BID   > On 2/13/2017, resumed Theophylline ER at a decreased dose of 200 mg PO q 12 hr   > Continue:    > Symbicort 160-4.5 2 puffs inhaled BID    > Theophylline ER at a decreased dose of 200 mg PO q 12 hr    > Albuterol nebulization q 4 hr PRN for shortness of breath     > Essential hypertension   > Continue Amlodipine 5 mg PO once daily (9AM)     > Type 2 diabetes mellitus   > On 2/10/2017:    > Added Lantus 10 units SC q HS    > Increased Metformin from 500 mg to 1,000 mg PO BID with meals   > Continue:    > Metformin 1,000 mg PO BID with meals    > Humalog insulin sliding scale SC TID AC only   > Discontinue Lantus 10 units SC q HS   > Add Sitagliptin 100 mg PO once daily      > Prerenal azotemia    > BUN/Creatinine ratio (2/09/2017) = 30 (BUN 25, Creatinine 0.84)    > BUN/Creatinine ratio (2/13/2017) = 27 (BUN 20, Creatinine 0.74)    > Increase oral fluid intake     > Chronic pain syndrome   > Continue:    > Acetaminophen 650 mg PO q 4 hr PRN for pain level less than 5/10    > Celecoxib 100 mg PO BID PC    > Fentanyl 50 mcg/hr patch, 1 patch on skin q 72 hr     > Percocet 7.5/325 1-2 tabs PO q 4 hr PRN for breakthrough pain level greater than 4/10 (from 8PM to 4AM only)     > Difficulty sleeping    > Continue:    > Melatonin 3 mg PO q HS    > Temazepam 15 mg PO q HS    > Vitamin D 25-Hydroxy (2/9/2017) = 30.2   > Patient was started on Cholecalciferol 2,000 units PO once daily       8. Patient's progress in rehabilitation and medical issues discussed with the patient. All questions answered to the best of my ability. Care plan discussed with patient and nurse.       Signed:    Inez Emerson MD    February 15, 2017

## 2017-02-15 NOTE — PROGRESS NOTES
Problem: Self Care Deficits Care Plan (Adult)  Goal: *Therapy Goal (Edit Goal, Insert Text)  Long Term Goals (to be met upon discharge date) in order to increase pts functional independence and safety, and decrease burden of care:  1. Pt will perform self-feeding with modified independence. 2. Pt will perform grooming with modified independence. 3. Pt will perform UB bathing with modified independence. 4. Pt will perform LB bathing with modified independence. 5. Pt will perform shower transfer with supervision. 6. Pt will perform UB dressing with independence. 7. Pt will perform LB dressing with modified independence. 8. Pt will perform toileting task with modified independence. 9. Pt will perform toilet transfer with modified independence. 10. Pt will perform an IADL task while standing with supervision. Short Term Weekly Goals for (17 to 17) in order to increase pts functional independence and safety, and decrease burden of care:  1. Pt will perform self-feeding with modified independence. 2. Pt will perform grooming with modified independence. 3. Pt will perform UB bathing with modified independence. 4. Pt will perform LB bathing with min A.  5. Pt will perform shower transfer with min A.  6. Pt will perform UB dressing with independence. 7. Pt will perform LB dressing with min A.  8. Pt will perform toileting task with min A.  9. Pt will perform toilet transfer with min A.   OCCUPATIONAL THERAPY DAILY NOTE  Patient Name:Latasha Rashid  Time Spent With Patient  Time In: 1400  Time Out: 1600  Medical Diagnosis:  Left hip fracture  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      Pain at start of tx:10/10  Pain at stop of tx:7/10     Patient identified with name and :Yes  Subjective: Pt c/o pain in L knee and hip. My shoulder hurt but I know this is good for my shoulder. Objective: Pt seen for therapeutic activity and IADL's.  Pt L knee wrapped with ace bandage to alleviate pain. THERAPEUTIC ACTIVITY Daily Assessment     Pt participated with Blokus from seated position unsupportive with one pound wrist weight. Pt follow commands placing Blockus pieces appropriately. THERAPEUTIC EXERCISE Daily Assessment     UE strengthening on arm bike for 15 minutes without rest break with increase tension. IADL Daily Assessment     Pt stood with rolling walker ~ 4:00 minutes preforming laundry, SBA for safety. LOWER BODY DRESSING Daily Assessment     Lower Body Dressing   Dressing Assistance : 5 (Supervision)  Leg Crossed Method Used: No  Position Performed: Seated in chair;Standing  Adaptive Equipment Used: Reacher;Walker  Comments: Pt doff/nany pants/short with AD with supervision with rolling walker. TOILETING Daily Assessment     Toileting  Toileting Assistance (FIM Score): 6 (Modified independent)  Adaptive Equipment: Grab bars          MOBILITY/TRANSFERS Daily Assessment     Functional Transfers  Toilet Transfer : Grab bars;Stand pivot transfer without device  Amount of Assistance Required: 4 (Contact guard assistance)      Assessment: Pt participated with activity standing tolerance although in pain. Pt demonstrate independency with self care with toileting task.      Plan of Care: Con't plan of care to maximize functional activities with ADL's     Lorrain Pass FLOYD/L  2/15/2017

## 2017-02-15 NOTE — ROUTINE PROCESS
SHIFT CHANGE NOTE FOR [facility]    Bedside and Verbal shift change report given to Carolyn Randle RN (oncoming nurse) by Claude Downing RN    (offgoing nurse). Report included the following information SBAR, Kardex, MAR and Recent Results.     Situation:   Code Status: Full Code   Reason for Admission: left hip replacement  Hospital Day: 7   Problem List:   Hospital Problems  Date Reviewed: 2/14/2017          Codes Class Noted POA    Encounter for monitoring of theophylline therapy ICD-10-CM: Z51.81, Z79.899  ICD-9-CM: V58.83, V58.69  2/9/2017 Yes    Overview Signed 2/9/2017 12:35 PM by Melissa Donaldson MD     Theophylline level (2/09/2017) = 21.0             Prerenal azotemia ICD-10-CM: R79.89  ICD-9-CM: 790.6  2/9/2017 Yes    Overview Signed 2/9/2017 12:35 PM by Melissa Donaldson MD     BUN/Creatinine ratio (2/09/2017) = 30 (BUN 25, Creatinine 0.84)              Chronic pain syndrome (Chronic) ICD-10-CM: G89.4  ICD-9-CM: 338.4  Unknown Yes        Acute blood loss as cause of postoperative anemia ICD-10-CM: D62  ICD-9-CM: 285.1  2/7/2017 Yes        Primary osteoarthritis of left hip (Chronic) ICD-10-CM: M16.12  ICD-9-CM: 715.15  Unknown Yes        Type 2 diabetes mellitus without complication (HCC) (Chronic) ICD-10-CM: E11.9  ICD-9-CM: 250.00  Unknown Yes        Essential hypertension (Chronic) ICD-10-CM: I10  ICD-9-CM: 401.9  Unknown Yes        Decreased calculated glomerular filtration rate (GFR) ICD-10-CM: R94.4  ICD-9-CM: 794.4  2/6/2017 Yes    Overview Signed 2/8/2017  5:12 PM by Melissa Donaldson MD     Calculated GFR is equivalent to that of CKD stage 2 = 60-89 ml/min             * (Principal)Status post total replacement of left hip ICD-10-CM: W60.214  ICD-9-CM: V43.64  2/6/2017 Yes    Overview Signed 2/8/2017  5:13 PM by MD Dr. Rossy dAener             Aftercare following left hip joint replacement surgery ICD-10-CM: Z47.1, U15.801  ICD-9-CM: V54.81, V43.64  2/6/2017 Yes    Overview Signed 2/8/2017  5:13 PM by Kenyetta Palmer MD     S/P Left total hip replacement (2/6/2017 - Dr. Swapnil Grande)                   Background:   Past Medical History:   Past Medical History   Diagnosis Date    Acute blood loss as cause of postoperative anemia 2/7/2017    Allergic rhinitis     Anxiety disorder     Bronchial asthma     Cataract of left eye     Chronic alcoholism (HCC)     Chronic pain syndrome     Decreased calculated glomerular filtration rate (GFR) 2/6/2017     Calculated GFR is equivalent to that of CKD stage 2 = 60-89 ml/min    Depression     Dyslipidemia     Essential hypertension     Gout     History of tobacco abuse     Incisional hernia     Lichen simplex chronicus     Obesity, Class III, BMI 40-49.9 (morbid obesity) ([facility] [address]     Paronychia     Primary osteoarthritis of left hip     Type 2 diabetes mellitus without complication (HCC)       Patient taking anticoagulants yes, Lovenox   Patient has a defibrillator: no     Assessment:   Changes in Assessment throughout shift: No     Patient has central line: no Reasons if yes: n/a  Insertion date:  N/a    Last dressing date:  n/a   Patient has Villatoro Cath: no Reasons if yes:  n/a  Insertion date:  n/a     Last Vitals:     Vitals:    02/1955 02/14/17 0727 02/14/17 1621 02/14/17 1930   BP: 155/86 126/66 133/79 138/73   Pulse: 87 67 88 87   Resp: 20 18 20 22   Temp: 99 °F (37.2 °C) 97 °F (36.1 °C) 97.5 °F (36.4 °C) 97.2 °F (36.2 °C)   SpO2: 92% 97% 93% 95%   Weight:       Height:            PAIN    Pain Assessment    Pain Intensity 1: 0 (02/15/17 0400) Pain Intensity 1: 2 (12/29/14 1105)    Pain Location 1: Hip Pain Location 1: Abdomen    Pain Intervention(s) 1: Medication (see MAR) Pain Intervention(s) 1: Medication (see MAR)  Patient Stated Pain Goal: 0 Patient Stated Pain Goal: 0  o Intervention effective: yes    o Other actions taken for pain: No     Skin Assessment  Skin color Skin Color: Appropriate for ethnicity  Condition/Temperature Skin Condition/Temp: Dry, Warm  Integrity Skin Integrity: Incision (comment)  Turgor Turgor: Non-tenting  Weekly Pressure Ulcer Documentation Weekly Pressure Ulcer Documentation: No Pressure Ulcer Noted-Pressure Ulcer Prevention Initiated  Wound Prevention & Protection Methods  Orientation of wound Orientation of Wound Prevention: Posterior  Location of Prevention Location of Wound Prevention: Sacrum/Coccyx  Dressing Present Dressing Present : No  Dressing Status    Wound Offloading Wound Offloading (Prevention Methods): Bed, pressure reduction mattress, Chair cushion     INTAKE/OUPUT    Date 02/14/17 0700 - 02/15/17 0659 02/15/17 0700 - 02/16/17 0659   Shift 3533-0370 2231-2637 24 Hour Total 2730-1110 3982-2590 24 Hour Total   I  N  T  A  K  E   P.O. 1260  1260         P. O. 1260  1260       Shift Total  (mL/kg) 1260  (11.9)  1260  (11.9)      O  U  T  P  U  T   Urine  (mL/kg/hr)            Urine Occurrence(s) 2 x 2 x 4 x       Stool            Stool Occurrence(s) 0 x 0 x 0 x       Shift Total  (mL/kg)         NET 1260  1260      Weight (kg) 105.7 105.7 105.7 105.7 105.7 105.7       Recommendations:  1. Patient needs and requests: education, emotional support and pain management. 2. Diet: diabetic    3. Pending tests/procedures: No     4. Functional Level/Equipment: 1 x person assist    5. Estimated Discharge Date: TDB Posted on Whiteboard in Patients Room: no     [facility] Safety Check    A safety check occurred in the patient's room between off going nurse and oncoming nurse listed above.     The safety check included the below items  Area Items   H  High Alert Medications - Verify all high alert medication drips (heparin, PCA, etc.)   E  Equipment - Suction is set up for ALL patients (with yanker)  - Red plugs utilized for all equipment (IV pumps, etc.)  - WOWs wiped down at end of shift.  - Room stocked with oxygen, suction, and other unit-specific supplies   A  Alarms - Bed alarm is set for fall risk patients  - Ensure chair alarm is in place and activated if patient is up in a chair   L  Lines - Check IV for any infiltration  - Villatoro bag is empty if patient has a Villatoro   - Tubing and IV bags are labeled   S  Safety   - Room is clean, patient is clean, and equipment is clean. - Hallways are clear from equipment besides carts. - Fall bracelet on for fall risk patients  - Ensure room is clear and free of clutter  - Suction is set up for ALL patients (with yanker)  - Hallways are clear from equipment besides carts.    - Isolation precautions followed, supplies available outside room, sign posted     Nory Ruiz LPN

## 2017-02-15 NOTE — PROGRESS NOTES
Problem: Mobility Impaired (Adult and Pediatric)  Goal: *Acute Goals and Plan of Care (Insert Text)  Physical Therapy Short Term Goals  Initiated 2/9/2017 and to be accomplished within 7 day(s) (02/16/2017)  1. Patient will move from supine to sit and sit to supine , scoot up and down and roll side to side in bed with moderate assistance . 2. Patient will transfer from bed to chair and chair to bed with moderate assistance using the least restrictive device and safe technique. 3. Patient will perform sit to stand with minimal assistance/contact guard assist.  4. Patient will ambulate with maximal assistance for 15 feet with the least restrictive device. Physical Therapy Long Term Goals  Initiated 2/9/2017 and to be accomplished within 21 day(s) (03/02/2017)  1. Patient will move from supine to sit and sit to supine , scoot up and down and roll side to side in bed with minimal assistance/contact guard assist.   2. Patient will transfer from bed to chair and chair to bed with minimal assistance/contact guard assist using the least restrictive device. 3. Patient will perform sit to stand with supervision/set-up. 4. Patient will ambulate with moderate assistance for 50 feet with the least restrictive device. 5. Patient will ascend/descend 4 stairs with 2 handrail(s) with moderate assistance . 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 Patient begins crying and requires affirmation of goals and redirection toward positive thought.  Patient agrees to participate in treatment session following discussion. Pain at start of tx:9/10  Pain at stop of tx:9/10     Patient identified with name and : YES         Objective: Patient participates in therex and transfer training as noted below with another patient present in gym concurrently under PT supervision. Patient with moderate distraction 2/2 social environment requiring verbal redirection to task. BED/MAT MOBILITY Daily Assessment     Supine to Sit : 3 (Moderate assistance) (trunk boost L LE management)  Sit to Supine : 3 (Moderate assistance) (trunk assist L LE management)     Patient with significant difficulty rolling L in session today requiring transition from supine to sitting occur with increased assistance today. TRANSFERS Daily Assessment     Transfer Type: Other  Other: Patient performs stand step transfer x 4 in session requiring min a x 1 for boost and B UE support 2/2 reports of discomfort and B knee stiffness. Sequencing cues and hand placement cues required for safety. Transfer Assistance : 4 (Minimal assistance)  Sit to Stand Assistance: Contact guard assistance (Patient requires increased time and verbal cueing for encourage participation)          GAIT Daily Assessment     Amount of Assistance: 4 (Minimal assistance) (anterior approach; B UE support; assisted weight shift)  Distance (ft): 25 Feet (ft) (x 3 sets)  Assistive Device: Gait belt      Patient demonstrates B LE step clearance and length deficits associated with decreased weight shift requiring verbal cueing and physical assist to facilitate clearance. Patient with forward flexed posture requiring physical assist and verbal cues to improve posture. Patient with decreased tolerance 2/2 reports of B knee pain associated with mobility.           BALANCE Daily Assessment     Sitting - Static: Good (unsupported)  Sitting - Dynamic: Good (unsupported)  Standing - Static: Fair  Standing - Dynamic : Impaired          WHEELCHAIR MOBILITY Daily Assessment Able to Propel (ft): 175 feet  Functional Level: 6 (B UE technique)     Additional time required to complete task. Patient requires intermittent encouragement to complete task. LOWER EXTREMITY EXERCISES Daily Assessment     Extremity: Both  Exercise Type #1: Other (comment) (ball squeeze in hooklying)  Sets Performed: 2  Reps Performed: 10 (3 sec)              Assessment: Patient demonstrates reduced ambulatory distance though she is more wiling to participate today despite resistance and self reported emotional stressors prior to treatment session. Patient will require continued skilled physical therapy intervention to improve mobility, independence, safety with mobility, and carry-over of technique. Plan of Care: Cont current POC; focus on continued education to improve understanding of need for therapy and importance of hip precautions. Ole Riggs, PT DPT  2/15/2017

## 2017-02-15 NOTE — ROUTINE PROCESS
SHIFT CHANGE NOTE FOR [facility]    Bedside and Verbal shift change report given to Rosa Peter RN (oncoming nurse) by Bridgett Vail RN LPN   (offgoing nurse). Report included the following information SBAR, Kardex, MAR and Recent Results.     Situation:   Code Status: Full Code   Reason for Admission: left hip replacement  Hospital Day: 6   Problem List:   Hospital Problems  Date Reviewed: 2/14/2017          Codes Class Noted POA    Encounter for monitoring of theophylline therapy ICD-10-CM: Z51.81, Z79.899  ICD-9-CM: V58.83, V58.69  2/9/2017 Yes    Overview Signed 2/9/2017 12:35 PM by Ysabel Diaz MD     Theophylline level (2/09/2017) = 21.0             Prerenal azotemia ICD-10-CM: R79.89  ICD-9-CM: 790.6  2/9/2017 Yes    Overview Signed 2/9/2017 12:35 PM by Ysabel Diaz MD     BUN/Creatinine ratio (2/09/2017) = 30 (BUN 25, Creatinine 0.84)              Chronic pain syndrome (Chronic) ICD-10-CM: G89.4  ICD-9-CM: 338.4  Unknown Yes        Acute blood loss as cause of postoperative anemia ICD-10-CM: D62  ICD-9-CM: 285.1  2/7/2017 Yes        Primary osteoarthritis of left hip (Chronic) ICD-10-CM: M16.12  ICD-9-CM: 715.15  Unknown Yes        Type 2 diabetes mellitus without complication (HCC) (Chronic) ICD-10-CM: E11.9  ICD-9-CM: 250.00  Unknown Yes        Essential hypertension (Chronic) ICD-10-CM: I10  ICD-9-CM: 401.9  Unknown Yes        Decreased calculated glomerular filtration rate (GFR) ICD-10-CM: R94.4  ICD-9-CM: 794.4  2/6/2017 Yes    Overview Signed 2/8/2017  5:12 PM by Ysabel Diaz MD     Calculated GFR is equivalent to that of CKD stage 2 = 60-89 ml/min             * (Principal)Status post total replacement of left hip ICD-10-CM: A96.691  ICD-9-CM: V43.64  2/6/2017 Yes    Overview Signed 2/8/2017  5:13 PM by YsabelMD Dr. Usman Redding             Aftercare following left hip joint replacement surgery ICD-10-CM: Z47.1, W18.270  ICD-9-CM: V54.81, V43.64  2/6/2017 Yes    Overview Signed 2/8/2017  5:13 PM by Issac Khan MD     S/P Left total hip replacement (2/6/2017 - Dr. Kayla Torres)                   Background:   Past Medical History:   Past Medical History   Diagnosis Date    Acute blood loss as cause of postoperative anemia 2/7/2017    Allergic rhinitis     Anxiety disorder     Bronchial asthma     Cataract of left eye     Chronic [facility]ism ([facility] [address]     Chronic pain syndrome     Decreased calculated glomerular filtration rate (GFR) 2/6/2017     Calculated GFR is equivalent to that of CKD stage 2 = 60-89 ml/min    Depression     Dyslipidemia     Essential hypertension     Gout     History of tobacco abuse     Incisional hernia     Lichen simplex chronicus     Obesity, Class III, BMI 40-49.9 (morbid obesity) ([facility] [address]     Paronychia     Primary osteoarthritis of left hip     Type 2 diabetes mellitus without complication ([facility] [address]       Patient taking anticoagulants yes    Patient has a defibrillator: no     Assessment:   Changes in Assessment throughout shift: No     Patient has central line: no Reasons if yes: Insertion date: Last dressing date:   Patient has Villatoro Cath: no Reasons if yes:     Insertion date:     Last Vitals:     Vitals:    02/13/17 1658 02/1955 02/14/17 0727 02/14/17 1621   BP: 151/80 155/86 126/66 133/79   Pulse: 85 87 67 88   Resp: 20 20 18 20   Temp: 96.8 °F (36 °C) 99 °F (37.2 °C) 97 °F (36.1 °C) 97.5 °F (36.4 °C)   SpO2: 93% 92% 97% 93%   Weight:       Height:            PAIN    Pain Assessment    Pain Intensity 1: 0 (02/14/17 1830) Pain Intensity 1: 2 (12/29/14 1105)    Pain Location 1: Back Pain Location 1: Abdomen    Pain Intervention(s) 1: Medication (see MAR) Pain Intervention(s) 1: Medication (see MAR)  Patient Stated Pain Goal: 0 Patient Stated Pain Goal: 0  o Intervention effective: yes    o Other actions taken for pain: No     Skin Assessment  Skin color Skin Color: Appropriate for ethnicity  Condition/Temperature Skin Condition/Temp: Dry, Warm  Integrity Skin Integrity: Incision (comment)  Turgor Turgor: Non-tenting  Weekly Pressure Ulcer Documentation Weekly Pressure Ulcer Documentation: No Pressure Ulcer Noted-Pressure Ulcer Prevention Initiated  Wound Prevention & Protection Methods  Orientation of wound Orientation of Wound Prevention: Posterior  Location of Prevention Location of Wound Prevention: Sacrum/Coccyx  Dressing Present Dressing Present : No  Dressing Status    Wound Offloading Wound Offloading (Prevention Methods): Bed, pressure reduction mattress, Chair cushion     INTAKE/OUPUT    Date 02/13/17 1900 - 02/14/17 0659 02/14/17 0700 - 02/15/17 0659   Shift 0216-0670 24 Hour Total 6921-5324 9515-2310 24 Hour Total   I  N  T  A  K  E   P.O.  1020 1260  1260      P. O.  1020 1260  1260    Shift Total  (mL/kg)  1020  (9.7) 1260  (11.9)  1260  (11.9)   O  U  T  P  U  T   Urine  (mL/kg/hr)           Urine Occurrence(s) 2 x 6 x 2 x  2 x    Stool           Stool Occurrence(s) 0 x 1 x 0 x  0 x    Shift Total  (mL/kg)        NET  1020 1260  1260   Weight (kg) 105.7 105.7 105.7 105.7 105.7       Recommendations:  1. Patient needs and requests: education    2. Diet: diabetic    3. Pending tests/procedures: No     4. Functional Level/Equipment: 1 x person assist    5. Estimated Discharge Date: TDB Posted on Whiteboard in Patients Room: [facility] Safety Check    A safety check occurred in the patient's room between off going nurse and oncoming nurse listed above.     The safety check included the below items  Area Items   H  High Alert Medications - Verify all high alert medication drips (heparin, PCA, etc.)   E  Equipment - Suction is set up for ALL patients (with yanker)  - Red plugs utilized for all equipment (IV pumps, etc.)  - WOWs wiped down at end of shift.  - Room stocked with oxygen, suction, and other unit-specific supplies   A  Alarms - Bed alarm is set for fall risk patients  - Ensure chair alarm is in place and activated if patient is up in a chair   L  Lines - Check IV for any infiltration  - Villatoro bag is empty if patient has a Villatoro   - Tubing and IV bags are labeled   S  Safety   - Room is clean, patient is clean, and equipment is clean. - Hallways are clear from equipment besides carts. - Fall bracelet on for fall risk patients  - Ensure room is clear and free of clutter  - Suction is set up for ALL patients (with yanker)  - Hallways are clear from equipment besides carts.    - Isolation precautions followed, supplies available outside room, sign posted     Tenna Bolus LPN

## 2017-02-15 NOTE — PROGRESS NOTES
I assisted pt to bed by helping her pull her legs into the bed. GEE Schaffer) was also there to apply a heat back to her knee. Nupur Bhatt asked if she would like it on the top of her knee or the bottom. The pt replied \"you have no idea what you are doing do you\". She then refused the heat pack. I asked if she wanted her door shut and she said yes. Nupur Bhatt and I both left the room, call bell within reach.

## 2017-02-15 NOTE — ROUTINE PROCESS
SHIFT CHANGE NOTE FOR [facility]    Bedside and Verbal shift change report given to Nancy Ruiz RN  (oncoming nurse) by Letha Esparza RN   (offgoing nurse). Report included the following information SBAR, Kardex, MAR and Recent Results.     Situation:   Code Status: Full Code   Reason for Admission: left hip replacement  Hospital Day: 7   Problem List:   Hospital Problems  Date Reviewed: 2/15/2017          Codes Class Noted POA    Encounter for monitoring of theophylline therapy ICD-10-CM: Z51.81, Z79.899  ICD-9-CM: V58.83, V58.69  2/9/2017 Yes    Overview Signed 2/9/2017 12:35 PM by Paulette Cota MD     Theophylline level (2/09/2017) = 21.0             Prerenal azotemia ICD-10-CM: R79.89  ICD-9-CM: 790.6  2/9/2017 Yes    Overview Signed 2/9/2017 12:35 PM by Paulette Cota MD     BUN/Creatinine ratio (2/09/2017) = 30 (BUN 25, Creatinine 0.84)              Chronic pain syndrome (Chronic) ICD-10-CM: G89.4  ICD-9-CM: 338.4  Unknown Yes        Acute blood loss as cause of postoperative anemia ICD-10-CM: D62  ICD-9-CM: 285.1  2/7/2017 Yes        Primary osteoarthritis of left hip (Chronic) ICD-10-CM: M16.12  ICD-9-CM: 715.15  Unknown Yes        Type 2 diabetes mellitus without complication (HCC) (Chronic) ICD-10-CM: E11.9  ICD-9-CM: 250.00  Unknown Yes        Essential hypertension (Chronic) ICD-10-CM: I10  ICD-9-CM: 401.9  Unknown Yes        Decreased calculated glomerular filtration rate (GFR) ICD-10-CM: R94.4  ICD-9-CM: 794.4  2/6/2017 Yes    Overview Signed 2/8/2017  5:12 PM by Paulette Cota MD     Calculated GFR is equivalent to that of CKD stage 2 = 60-89 ml/min             * (Principal)Status post total replacement of left hip ICD-10-CM: O16.258  ICD-9-CM: V43.64  2/6/2017 Yes    Overview Signed 2/8/2017  5:13 PM by MD Dr. Jose Antonio Schneider             Aftercare following left hip joint replacement surgery ICD-10-CM: Z47.1, S47.980  ICD-9-CM: V54.81, V43.64  2/6/2017 Yes    Overview Signed 2/8/2017  5:13 PM by Melissa Donaldson MD     S/P Left total hip replacement (2/6/2017 - Dr. Rossy Alvarado)                   Background:   Past Medical History:   Past Medical History   Diagnosis Date    Acute blood loss as cause of postoperative anemia 2/7/2017    Allergic rhinitis     Anxiety disorder     Bronchial asthma     Cataract of left eye     Chronic alcoholism ([facility] [address]     Chronic pain syndrome     Decreased calculated glomerular filtration rate (GFR) 2/6/2017     Calculated GFR is equivalent to that of CKD stage 2 = 60-89 ml/min    Depression     Dyslipidemia     Essential hypertension     Gout     History of tobacco abuse     Incisional hernia     Lichen simplex chronicus     Obesity, Class III, BMI 40-49.9 (morbid obesity) ([facility] [address]     Paronychia     Primary osteoarthritis of left hip     Type 2 diabetes mellitus without complication ([facility] [address]       Patient taking anticoagulants yes    Patient has a defibrillator: no     Assessment:   Changes in Assessment throughout shift: No     Patient has central line: no Reasons if yes: Insertion date: Last dressing date:   Patient has Villatoro Cath: no Reasons if yes:     Insertion date:     Last Vitals:     Vitals:    02/14/17 1621 02/14/17 1930 02/15/17 0723 02/15/17 1547   BP: 133/79 138/73 130/74 129/71   Pulse: 88 87 79 76   Resp: 20 22 20 18   Temp: 97.5 °F (36.4 °C) 97.2 °F (36.2 °C) 97.9 °F (36.6 °C) 97.6 °F (36.4 °C)   SpO2: 93% 95% 96% 98%   Weight:       Height:            PAIN    Pain Assessment    Pain Intensity 1: 7 (02/15/17 1500) Pain Intensity 1: 2 (12/29/14 1105)    Pain Location 1: Hip Pain Location 1: Abdomen    Pain Intervention(s) 1: Medication (see MAR) Pain Intervention(s) 1: Medication (see MAR)  Patient Stated Pain Goal: 0 Patient Stated Pain Goal: 0  o Intervention effective: no    o Other actions taken for pain: pain medicine     Skin Assessment  Skin color Skin Color: Appropriate for ethnicity  Condition/Temperature Skin Condition/Temp: Dry, Warm  Integrity Skin Integrity: Incision (comment)  Turgor Turgor: Non-tenting  Weekly Pressure Ulcer Documentation Weekly Pressure Ulcer Documentation: No Pressure Ulcer Noted-Pressure Ulcer Prevention Initiated  Wound Prevention & Protection Methods  Orientation of wound Orientation of Wound Prevention: Posterior  Location of Prevention Location of Wound Prevention: Sacrum/Coccyx  Dressing Present Dressing Present : No  Dressing Status    Wound Offloading Wound Offloading (Prevention Methods): Bed, pressure redistribution/air, Chair cushion, Pillows, Turning, Wheelchair     INTAKE/OUPUT    Date 02/14/17 0700 - 02/15/17 0659 02/15/17 0700 - 02/16/17 0659   Shift 5601-3962 3993-6758 24 Hour Total 3890-3822 2242-6238 24 Hour Total   I  N  T  A  K  E   P.O. 1260  1260 380  380      P. O. 1260  1260 380  380    Shift Total  (mL/kg) 1260  (11.9)  1260  (11.9) 380  (3.6)  380  (3.6)   O  U  T  P  U  T   Urine  (mL/kg/hr)            Urine Occurrence(s) 2 x 3 x 5 x 2 x  2 x    Stool            Stool Occurrence(s) 0 x 0 x 0 x 0 x  0 x    Shift Total  (mL/kg)         NET 1260  1260 380  380   Weight (kg) 105.7 105.7 105.7 105.7 105.7 105.7       Recommendations:  1. Patient needs and requests: pain medicine    2. Diet: diabetic    3. Pending tests/procedures: No     4. Functional Level/Equipment: 1 x person assist    5. Estimated Discharge Date: TDB Posted on Whiteboard in Patients Room: no     [facility] Safety Check    A safety check occurred in the patient's room between off going nurse and oncoming nurse listed above.     The safety check included the below items  Area Items   H  High Alert Medications - Verify all high alert medication drips (heparin, PCA, etc.)   E  Equipment - Suction is set up for ALL patients (with yanker)  - Red plugs utilized for all equipment (IV pumps, etc.)  - WOWs wiped down at end of shift.  - Room stocked with oxygen, suction, and other unit-specific supplies   A  Alarms - Bed alarm is set for fall risk patients  - Ensure chair alarm is in place and activated if patient is up in a chair   L  Lines - Check IV for any infiltration  - Villatoro bag is empty if patient has a Villatoro   - Tubing and IV bags are labeled   S  Safety   - Room is clean, patient is clean, and equipment is clean. - Hallways are clear from equipment besides carts. - Fall bracelet on for fall risk patients  - Ensure room is clear and free of clutter  - Suction is set up for ALL patients (with momoker)  - Hallways are clear from equipment besides carts.    - Isolation precautions followed, supplies available outside room, sign posted

## 2017-02-16 LAB
GLUCOSE BLD STRIP.AUTO-MCNC: 101 MG/DL (ref 70–110)
GLUCOSE BLD STRIP.AUTO-MCNC: 113 MG/DL (ref 70–110)
GLUCOSE BLD STRIP.AUTO-MCNC: 118 MG/DL (ref 70–110)
GLUCOSE BLD STRIP.AUTO-MCNC: 146 MG/DL (ref 70–110)
HCT VFR BLD AUTO: 29.9 % (ref 35–45)
HGB BLD-MCNC: 9.5 G/DL (ref 12–16)
PLATELET # BLD AUTO: 412 K/UL (ref 135–420)

## 2017-02-16 PROCEDURE — 97530 THERAPEUTIC ACTIVITIES: CPT

## 2017-02-16 PROCEDURE — 97116 GAIT TRAINING THERAPY: CPT

## 2017-02-16 PROCEDURE — 74011250637 HC RX REV CODE- 250/637: Performed by: INTERNAL MEDICINE

## 2017-02-16 PROCEDURE — 65310000000 HC RM PRIVATE REHAB

## 2017-02-16 PROCEDURE — 36415 COLL VENOUS BLD VENIPUNCTURE: CPT | Performed by: INTERNAL MEDICINE

## 2017-02-16 PROCEDURE — 97110 THERAPEUTIC EXERCISES: CPT

## 2017-02-16 PROCEDURE — 80198 ASSAY OF THEOPHYLLINE: CPT | Performed by: INTERNAL MEDICINE

## 2017-02-16 PROCEDURE — 74011250636 HC RX REV CODE- 250/636: Performed by: INTERNAL MEDICINE

## 2017-02-16 PROCEDURE — 82962 GLUCOSE BLOOD TEST: CPT

## 2017-02-16 PROCEDURE — 85018 HEMOGLOBIN: CPT | Performed by: INTERNAL MEDICINE

## 2017-02-16 PROCEDURE — 85049 AUTOMATED PLATELET COUNT: CPT | Performed by: INTERNAL MEDICINE

## 2017-02-16 PROCEDURE — 74011000250 HC RX REV CODE- 250: Performed by: INTERNAL MEDICINE

## 2017-02-16 PROCEDURE — 77030012891

## 2017-02-16 PROCEDURE — 97535 SELF CARE MNGMENT TRAINING: CPT

## 2017-02-16 RX ADMIN — METFORMIN HYDROCHLORIDE 1000 MG: 500 TABLET, FILM COATED ORAL at 08:48

## 2017-02-16 RX ADMIN — OXYCODONE HYDROCHLORIDE AND ACETAMINOPHEN 2 TABLET: 7.5; 325 TABLET ORAL at 06:30

## 2017-02-16 RX ADMIN — BUSPIRONE HYDROCHLORIDE 20 MG: 5 TABLET ORAL at 21:39

## 2017-02-16 RX ADMIN — CELECOXIB 100 MG: 100 CAPSULE ORAL at 08:52

## 2017-02-16 RX ADMIN — SERTRALINE HYDROCHLORIDE 100 MG: 50 TABLET ORAL at 08:48

## 2017-02-16 RX ADMIN — OXYCODONE HYDROCHLORIDE AND ACETAMINOPHEN 2 TABLET: 7.5; 325 TABLET ORAL at 11:40

## 2017-02-16 RX ADMIN — BUDESONIDE AND FORMOTEROL FUMARATE DIHYDRATE 2 PUFF: 160; 4.5 AEROSOL RESPIRATORY (INHALATION) at 08:48

## 2017-02-16 RX ADMIN — FAMOTIDINE 20 MG: 20 TABLET ORAL at 18:16

## 2017-02-16 RX ADMIN — THEOPHYLLINE 200 MG: 200 TABLET, EXTENDED RELEASE ORAL at 21:39

## 2017-02-16 RX ADMIN — BUSPIRONE HYDROCHLORIDE 20 MG: 5 TABLET ORAL at 18:16

## 2017-02-16 RX ADMIN — ENOXAPARIN SODIUM 40 MG: 100 INJECTION SUBCUTANEOUS at 06:24

## 2017-02-16 RX ADMIN — AMLODIPINE BESYLATE 5 MG: 5 TABLET ORAL at 08:48

## 2017-02-16 RX ADMIN — ALLOPURINOL 300 MG: 100 TABLET ORAL at 09:45

## 2017-02-16 RX ADMIN — THEOPHYLLINE 200 MG: 200 TABLET, EXTENDED RELEASE ORAL at 08:48

## 2017-02-16 RX ADMIN — OXYCODONE HYDROCHLORIDE AND ACETAMINOPHEN 2 TABLET: 7.5; 325 TABLET ORAL at 15:30

## 2017-02-16 RX ADMIN — Medication 250 MG: at 08:48

## 2017-02-16 RX ADMIN — METFORMIN HYDROCHLORIDE 1000 MG: 500 TABLET, FILM COATED ORAL at 18:16

## 2017-02-16 RX ADMIN — SITAGLIPTIN 100 MG: 50 TABLET, FILM COATED ORAL at 08:47

## 2017-02-16 RX ADMIN — BUSPIRONE HYDROCHLORIDE 20 MG: 5 TABLET ORAL at 08:48

## 2017-02-16 RX ADMIN — MELATONIN TAB 3 MG 3 MG: 3 TAB at 21:39

## 2017-02-16 RX ADMIN — CELECOXIB 100 MG: 100 CAPSULE ORAL at 18:16

## 2017-02-16 RX ADMIN — ALBUTEROL SULFATE 2.5 MG: 2.5 SOLUTION RESPIRATORY (INHALATION) at 18:16

## 2017-02-16 RX ADMIN — PERPHENAZINE 2 MG: 2 TABLET, FILM COATED ORAL at 21:39

## 2017-02-16 RX ADMIN — VITAMIN D, TAB 1000IU (100/BT) 2000 UNITS: 25 TAB at 08:48

## 2017-02-16 RX ADMIN — FAMOTIDINE 20 MG: 20 TABLET ORAL at 08:49

## 2017-02-16 RX ADMIN — THERA TABS 1 TABLET: TAB at 08:48

## 2017-02-16 RX ADMIN — LORATADINE 10 MG: 10 TABLET ORAL at 08:48

## 2017-02-16 RX ADMIN — TEMAZEPAM 15 MG: 15 CAPSULE ORAL at 21:39

## 2017-02-16 RX ADMIN — SIMVASTATIN 20 MG: 20 TABLET, FILM COATED ORAL at 21:40

## 2017-02-16 RX ADMIN — Medication 325 MG: at 08:48

## 2017-02-16 NOTE — PROGRESS NOTES
[address]  [address] [place] [address], [address]     INPATIENT REHABILITATION  DAILY PROGRESS NOTE     Date: 2/16/2017    Name: Britany Guzman Age / Sex: 64 y.o. / female   CSN: [id number] MRN: [id number]   [facility] Date: 2/8/2017 Length of Stay: 8 days     Primary Rehab Diagnosis: Impaired Mobility and ADLs secondary to:  1. Primary osteoarthritis of the left hip  2. S/P Left total hip replacement (2/6/2017 - Dr. Maine Nguyen)      Subjective:     Patient seen and examined. Blood pressure controlled. Blood sugars controlled. Patient's Complaint:   No significant medical complaints    Pain Control: stable, mild-to-moderate joint symptoms intermittently, reasonably well controlled by current meds      Objective:     Vital Signs:  Patient Vitals for the past 24 hrs:   BP Temp Pulse Resp SpO2   02/16/17 0739 141/74 96.5 °F (35.8 °C) 84 20 91 %   02/15/17 2000 131/66 97.9 °F (36.6 °C) 80 22 95 %   02/15/17 1547 129/71 97.6 °F (36.4 °C) 76 18 98 %        Physical Exam:  GENERAL SURVEY: Patient is awake, alert, oriented x 3, sitting comfortably on the chair, not in acute respiratory distress. HEENT: pale palpebral conjunctivae, anicteric sclerae, no nasoaural discharge, moist oral mucosa  NECK: supple, no jugular venous distention, no palpable lymph nodes  CHEST/LUNGS: symmetrical chest expansion, good air entry, clear breath sounds  HEART: adynamic precordium, good S1 S2, no S3, regular rhythm, no murmurs  ABDOMEN: obese, bowel sounds appreciated, soft, non-tender  EXTREMITIES: pale nailbeds, no edema, full and equal pulses, no calf tenderness   NEUROLOGICAL EXAM: The patient is awake, alert and oriented x3, able to answer questions fairly appropriately, able to follow 1 and 2 step commands. Able to tell time from the wall clock. Cranial nerves II-XII are grossly intact. No gross sensory deficit.  Motor strength is 4-/5 on BUE, 4-/5 on the RLE, 1/5 on the left hip, 3/5 on the left knee, 3+/5 on the left ankle.     Incision(s): covered, clean, dry, and intact       Current Medications:  Current Facility-Administered Medications   Medication Dose Route Frequency    SITagliptin (JANUVIA) tablet 100 mg  100 mg Oral DAILY    temazepam (RESTORIL) capsule 15 mg  15 mg Oral QHS    metFORMIN (GLUCOPHAGE) tablet 1,000 mg  1,000 mg Oral BID WITH MEALS    albuterol (PROVENTIL HFA, VENTOLIN HFA, PROAIR HFA) inhaler 2 Puff  2 Puff Inhalation Q4H PRN    ferrous sulfate tablet 325 mg  1 Tab Oral DAILY WITH BREAKFAST    ascorbic acid (vitamin C) (VITAMIN C) tablet 250 mg  250 mg Oral DAILY WITH BREAKFAST    theophylline ER(12 hour) (THEOCHRON) tablet 200 mg  200 mg Oral Q12H    cholecalciferol (VITAMIN D3) tablet 2,000 Units  2,000 Units Oral DAILY    busPIRone (BUSPAR) tablet 20 mg  20 mg Oral TID    melatonin tablet 3 mg  3 mg Oral QHS    budesonide-formoterol (SYMBICORT) 160-4.5 mcg/actuation HFA inhaler 2 Puff  2 Puff Inhalation BID RT    perphenazine (TRILAFON) tablet tab 2 mg  2 mg Oral QHS    acetaminophen (TYLENOL) tablet 650 mg  650 mg Oral Q4H PRN    bisacodyl (DULCOLAX) tablet 10 mg  10 mg Oral Q48H PRN    enoxaparin (LOVENOX) injection 40 mg  40 mg SubCUTAneous Q24H    insulin lispro (HUMALOG) injection   SubCUTAneous TIDAC    albuterol (PROVENTIL VENTOLIN) nebulizer solution 2.5 mg  2.5 mg Nebulization Q4H PRN    allopurinol (ZYLOPRIM) tablet 300 mg  300 mg Oral DAILY    amLODIPine (NORVASC) tablet 5 mg  5 mg Oral DAILY    celecoxib (CELEBREX) capsule 100 mg  100 mg Oral BID WITH MEALS    loratadine (CLARITIN) tablet 10 mg  10 mg Oral DAILY    famotidine (PEPCID) tablet 20 mg  20 mg Oral BID    sertraline (ZOLOFT) tablet 100 mg  100 mg Oral DAILY    simvastatin (ZOCOR) tablet 20 mg  20 mg Oral QHS    oxyCODONE-acetaminophen (PERCOCET 7.5) 7.5-325 mg per tablet 1-2 Tab  1-2 Tab Oral Q4H PRN    glucose chewable tablet 16 g  4 Tab Oral PRN    glucagon (GLUCAGEN) injection 1 mg  1 mg IntraMUSCular PRN    dextrose (D50W) injection syrg 12.5-25 g  25-50 mL IntraVENous PRN    fentaNYL (DURAGESIC) 50 mcg/hr patch 1 Patch  1 Patch TransDERmal Q72H    therapeutic multivitamin (THERAGRAN) tablet 1 Tab  1 Tab Oral DAILY    nitroglycerin (NITROSTAT) tablet 0.4 mg  0.4 mg SubLINGual PRN    diphenhydrAMINE (BENADRYL) capsule 25 mg  25 mg Oral Q6H PRN       Allergies:  No Known Allergies      Functional Progress:    OCCUPATIONAL THERAPY    ON ADMISSION MOST RECENT   Eating  Functional Level: 5   Eating  Functional Level: 5     Grooming  Functional Level: 5   Grooming  Functional Level: 5     Bathing  Functional Level: 1 (Pt stood in shower at home.)   Bathing  Functional Level: 1 (Pt stood in shower at home.)     Upper Body Dressing  Functional Level: 5   Upper Body Dressing  Functional Level: 5     Lower Body Dressing  Functional Level: 2   Lower Body Dressing  Functional Level: 2     Toileting  Functional Level: 2   Toileting  Functional Level: 4     Toilet Transfers  Toilet Transfer Score: 0   Toilet Transfers  Toilet Transfer Score: 3     Tub /Shower Transfers  Tub/Shower Transfer Score: 0   Tub/Shower Transfers  Tub/Shower Transfer Score: 0       Legend:   7 - Independent   6 - Modified Independent   5 - Standby Assistance / Supervision / Set-up   4 - Minimum Assistance / Contact Guard Assistance   3 - Moderate Assistance   2 - Maximum Assistance   1 - Total Assistance / Dependent       Lab/Data Review:  Recent Results (from the past 24 hour(s))   GLUCOSE, POC    Collection Time: 02/15/17  4:36 PM   Result Value Ref Range    Glucose (POC) 127 (H) 70 - 110 mg/dL   GLUCOSE, POC    Collection Time: 02/15/17  7:45 PM   Result Value Ref Range    Glucose (POC) 107 70 - 110 mg/dL   HGB & HCT    Collection Time: 02/16/17  6:24 AM   Result Value Ref Range    HGB 9.5 (L) 12.0 - 16.0 g/dL    HCT 29.9 (L) 35.0 - 45.0 %   PLATELET    Collection Time: 02/16/17  6:24 AM   Result Value Ref Range    PLATELET 589 691 - 242 K/uL   GLUCOSE, POC    Collection Time: 02/16/17  8:26 AM   Result Value Ref Range    Glucose (POC) 101 70 - 110 mg/dL   GLUCOSE, POC    Collection Time: 02/16/17 11:45 AM   Result Value Ref Range    Glucose (POC) 146 (H) 70 - 110 mg/dL       Creatinine Clearance (Lord Christensen): On admission, estimated creatinine clearance was 81.8 ml/min (based on Cr of 0.84). Most recent estimated creatinine clearance is 92.9 mL/min (based on Cr of 0.74). Assessment:     Primary Rehab Diagnosis  1. Impaired Mobility and ADLs  2. Primary osteoarthritis of the left hip  3. S/P Left total hip replacement (2/6/2017 - Dr. Nadege Triana)     Comorbidities   Bronchial asthma    Type 2 diabetes mellitus without complication    Essential hypertension    Depression    Dyslipidemia    Decreased calculated glomerular filtration rate (GFR)    Aftercare following left hip joint replacement surgery    Acute blood loss as cause of postoperative anemia    Gout    Allergic rhinitis    Cataract of left eye    Incisional hernia    Lichen simplex chronicus    Paronychia    Chronic alcoholism    Tobacco abuse    Chronic pain syndrome    Obesity, Class III, BMI 40-49.9 (morbid obesity)     Anxiety disorder    Encounter for monitoring of theophylline therapy    Prerenal azotemia       Plan:     1. Justification for continued stay: Good progression towards established rehabilitation goals. 2. Medical Issues being followed closely:    [x]  Fall and safety precautions     [x]  Wound Care     [x]  Bowel and Bladder Function     [x]  Fluid Electrolyte and Nutrition Balance     [x]  Pain Control      3.  Issues that 24 hour rehabilitation nursing is following:    [x]  Fall and safety precautions     [x]  Wound Care     [x]  Bowel and Bladder Function     [x]  Fluid Electrolyte and Nutrition Balance     [x]  Pain Control      [x]  Assistance with and education on in-room safety with transfers to and from the bed, wheelchair, toilet and shower. 4. Acute rehabilitation plan of care:    [x]  Continue current care and rehab. [x]  Physical Therapy           [x]  Occupational Therapy           []  Speech Therapy     []  Hold Rehab until further notice     5. Medications:    [x]  MAR Reviewed     [x]  Continue Present Medications     6. DVT Prophylaxis:      [x]  Lovenox     []  Unfractionated Heparin     []  Coumadin     []  Xarelto     [x]  RUIZ Stockings     []  Sequential Compression Device     []  None     7.  Orders:   > Primary osteoarthritis of the left hip; S/P Left total hip replacement (2/6/2017 - Dr. Izabela Oreilly)   > Weightbearing as tolerated on the left lower extremity   > Total hip precautions on the left hip    > Staples to be removed on 2/20/2017     > Acute Postoperative Blood Loss Anemia   > Hgb/Hct (2/09/2017, on admission) = 9.9/29.6   > Anemia work-up showed serum iron 16, TIBC 210, iron % saturation 8, ferritin 215, reticulocyte count 1.3   > Patient was started on FeSO4 and Ascorbic acid   > Continue:    > FeSO4 325 mg PO once daily with breakfast     > Ascorbic Acid 250 mg PO once daily with breakfast (to enhance the absorption of the FeSO4)     > Anxiety / Depression   > Patient was discharged from [facility] on:    > Diazepam 10 mg PO q 6 hr PRN for anxiety    > Buspirone 30 mg PO daily    > Perphenazine 4 mg PO BID    > Sertraline 100 mg PO once daily   > Confirmed psychiatric medications from [address] [place] [address] as follows:    > Diazepam 10 mg PO TID PRN    > Buspirone 30 mg PO 4 times daily    > Perphenazine 2 mg PO q HS    > Sertraline 100 mg PO BID   > On 2/10/2017:    > Held Diazepam 10 mg PO TID PRN    > Changed Buspirone from 30 mg PO daily to 20 mg PO TID    > Decreased Perphenazine from 4 mg PO BID to 2 mg PO q HS    > Added:     > Modafinil 100 mg PO q 8AM     > Melatonin 3 mg PO q HS   > On 2/15/2017, discontinued Modafinil 100 mg PO q 8AM   > Continue:    > Buspirone 20 mg PO TID    > Perphenazine 2 mg PO q HS    > Sertraline 100 mg PO once daily    > Melatonin 3 mg PO q HS    > Bronchial asthma   > On 2/09/2017, discontinued:    > Arformoterol nebulization BID    > Budesonide nebulization BID    > Theophylline level (2/09/2017) = 21.0 (N.V. = 5-15)    > Hold Theophylline  mg PO q 12 hr   > On 2/10/2017, patient was started on Symbicort 160-4.5 2 puffs inhaled BID   > On 2/13/2017, resumed Theophylline ER at a decreased dose of 200 mg PO q 12 hr   > Continue:    > Symbicort 160-4.5 2 puffs inhaled BID    > Theophylline ER at a decreased dose of 200 mg PO q 12 hr    > Albuterol nebulization q 4 hr PRN for shortness of breath     > Essential hypertension   > Continue Amlodipine 5 mg PO once daily (9AM)     > Type 2 diabetes mellitus   > On 2/10/2017:    > Added Lantus 10 units SC q HS    > Increased Metformin from 500 mg to 1,000 mg PO BID with meals   > On 2/15/2017:    > Discontinued Lantus 10 units SC q HS    > Added Sitagliptin 100 mg PO once daily    > Continue:    > Metformin 1,000 mg PO BID with meals    > Sitagliptin 100 mg PO once daily    > Humalog insulin sliding scale SC TID AC only      > Prerenal azotemia    > BUN/Creatinine ratio (2/09/2017) = 30 (BUN 25, Creatinine 0.84)    > BUN/Creatinine ratio (2/13/2017) = 27 (BUN 20, Creatinine 0.74)    > Increase oral fluid intake     > Chronic pain syndrome   > Continue:    > Acetaminophen 650 mg PO q 4 hr PRN for pain level less than 5/10    > Celecoxib 100 mg PO BID PC    > Fentanyl 50 mcg/hr patch, 1 patch on skin q 72 hr     > Percocet 7.5/325 1-2 tabs PO q 4 hr PRN for breakthrough pain level greater than 4/10 (from 8PM to 4AM only)     > Difficulty sleeping    > Continue:    > Melatonin 3 mg PO q HS    > Temazepam 15 mg PO q HS    > Vitamin D 25-Hydroxy (2/9/2017) = 30.2   > Patient was started on Cholecalciferol 2,000 units PO once daily       8.  Patient's progress in rehabilitation and medical issues discussed with the patient. All questions answered to the best of my ability. Care plan discussed with patient and nurse.       Signed:    Wally Naranjo MD    February 16, 2017

## 2017-02-16 NOTE — PROGRESS NOTES
[x] Psychology  [] Social Work [] Recreational Therapy    INTERVENTION  UNITS/TIME OF SERVICE   Assessment    Supportive Counseling February 16, 2017   Orientation    Discharge Planning    Resource Linkage              Progress/Current Status    Patient attended orthopedic support group on ARU this date and all were educated about post hospital recovery issues, including probable continued scheduled therapy, and what to expect about recovery post hospital, in general.  Patient was originally focused on somatic issues, especially in relation to what she says is a \"swelled left ankle. \"  She was angry that this is occurring and questioned \"why aren't they doing something about it? Why doesn't it have a brace? \"  Other patients redirected her to discuss further with Dr. Eliecer Lawson and she was then able to direct her full attention to group conversation about being injured or, for some, recovering from surgery. Patient actively engaged in conversation with all.     Susy Benavides, [facility] 2/16/2017 4:50 PM

## 2017-02-16 NOTE — ROUTINE PROCESS
SHIFT CHANGE NOTE FOR [facility]    Bedside and Verbal shift change report given to Kelechi Wilson RN  (oncoming nurse) by Nanda Bautista LPN   (offgoing nurse). Report included the following information SBAR, Kardex, MAR and Recent Results.     Situation:   Code Status: Full Code   Reason for Admission: left hip replacement  Hospital Day: 8   Problem List:   Hospital Problems  Date Reviewed: 2/16/2017          Codes Class Noted POA    Encounter for monitoring of theophylline therapy ICD-10-CM: Z51.81, Z79.899  ICD-9-CM: V58.83, V58.69  2/9/2017 Yes    Overview Signed 2/9/2017 12:35 PM by Cleo Jacobsen MD     Theophylline level (2/09/2017) = 21.0             Prerenal azotemia ICD-10-CM: R79.89  ICD-9-CM: 790.6  2/9/2017 Yes    Overview Signed 2/9/2017 12:35 PM by Cleo Jacobsen MD     BUN/Creatinine ratio (2/09/2017) = 30 (BUN 25, Creatinine 0.84)              Chronic pain syndrome (Chronic) ICD-10-CM: G89.4  ICD-9-CM: 338.4  Unknown Yes        Acute blood loss as cause of postoperative anemia ICD-10-CM: D62  ICD-9-CM: 285.1  2/7/2017 Yes        Primary osteoarthritis of left hip (Chronic) ICD-10-CM: M16.12  ICD-9-CM: 715.15  Unknown Yes        Type 2 diabetes mellitus without complication (HCC) (Chronic) ICD-10-CM: E11.9  ICD-9-CM: 250.00  Unknown Yes        Essential hypertension (Chronic) ICD-10-CM: I10  ICD-9-CM: 401.9  Unknown Yes        Decreased calculated glomerular filtration rate (GFR) ICD-10-CM: R94.4  ICD-9-CM: 794.4  2/6/2017 Yes    Overview Signed 2/8/2017  5:12 PM by Cleo Jacobsen MD     Calculated GFR is equivalent to that of CKD stage 2 = 60-89 ml/min             * (Principal)Status post total replacement of left hip ICD-10-CM: F78.355  ICD-9-CM: V43.64  2/6/2017 Yes    Overview Signed 2/8/2017  5:13 PM by MD Dr. Johnie Mascorro             Aftercare following left hip joint replacement surgery ICD-10-CM: Z47.1, T11.755  ICD-9-CM: V54.81, V43.64  2/6/2017 Yes    Overview Signed 2/8/2017 5:13 PM by Neo Shultz MD     S/P Left total hip replacement (2/6/2017 - Dr. Dong Moses)                   Background:   Past Medical History:   Past Medical History   Diagnosis Date    Acute blood loss as cause of postoperative anemia 2/7/2017    Allergic rhinitis     Anxiety disorder     Bronchial asthma     Cataract of left eye     [facility] [address] [address]     Chronic pain syndrome     Decreased calculated glomerular filtration rate (GFR) 2/6/2017     Calculated GFR is equivalent to that of CKD stage 2 = 60-89 ml/min    Depression     Dyslipidemia     Essential hypertension     Gout     History of tobacco abuse     Incisional hernia     Lichen simplex chronicus     Obesity, Class III, BMI 40-49.9 (morbid obesity) ([facility] [address]     Paronychia     Primary osteoarthritis of left hip     Type 2 diabetes mellitus without complication ([facility] [address]       Patient taking anticoagulants yes    Patient has a defibrillator: no     Assessment:   Changes in Assessment throughout shift: No     Patient has central line: no Reasons if yes: Insertion date: Last dressing date:   Patient has Villatoro Cath: no Reasons if yes:     Insertion date:     Last Vitals:     Vitals:    02/15/17 0723 02/15/17 1547 02/15/17 2000 02/16/17 0739   BP: 130/74 129/71 131/66 141/74   Pulse: 79 76 80 84   Resp: 20 18 22 20   Temp: 97.9 °F (36.6 °C) 97.6 °F (36.4 °C) 97.9 °F (36.6 °C) 96.5 °F (35.8 °C)   SpO2: 96% 98% 95% 91%   Weight:       Height:            PAIN    Pain Assessment    Pain Intensity 1: 4 (02/16/17 1400) Pain Intensity 1: 2 (12/29/14 1105)    Pain Location 1: Back, Hip Pain Location 1: Abdomen    Pain Intervention(s) 1: Medication (see MAR) Pain Intervention(s) 1: Medication (see MAR)  Patient Stated Pain Goal: 0 Patient Stated Pain Goal: 0  o Intervention effective: no    o Other actions taken for pain: pain medicine     Skin Assessment  Skin color Skin Color: Appropriate for ethnicity  Condition/Temperature Skin Condition/Temp: Dry, Warm  Integrity Skin Integrity: Incision (comment)  Turgor Turgor: Non-tenting  Weekly Pressure Ulcer Documentation Weekly Pressure Ulcer Documentation: No Pressure Ulcer Noted-Pressure Ulcer Prevention Initiated  Wound Prevention & Protection Methods  Orientation of wound Orientation of Wound Prevention: Posterior  Location of Prevention Location of Wound Prevention: Buttocks, Sacrum/Coccyx  Dressing Present Dressing Present : No  Dressing Status    Wound Offloading Wound Offloading (Prevention Methods): Bed, pressure reduction mattress     INTAKE/OUPUT    Date 02/15/17 0700 - 02/16/17 0659 02/16/17 0700 - 02/17/17 0659   Shift 4020-9962 8558-8311 24 Hour Total 2515-4316 1419-7283 24 Hour Total   I  N  T  A  K  E   P. O. 780  780 840  840      P. O. 780  780 840  840    Shift Total  (mL/kg) 780  (7.4)  780  (7.4) 840  (7.9)  840  (7.9)   O  U  T  P  U  T   Urine  (mL/kg/hr)            Urine Occurrence(s) 3 x 2 x 5 x 1 x  1 x    Stool            Stool Occurrence(s) 0 x 0 x 0 x 0 x  0 x    Shift Total  (mL/kg)           780 840  840   Weight (kg) 105.7 105.7 105.7 105.7 105.7 105.7       Recommendations:  1. Patient needs and requests: pain medicine    2. Diet: diabetic    3. Pending tests/procedures: No     4. Functional Level/Equipment: 1 x person assist    5. Estimated Discharge Date: TDB Posted on Whiteboard in Patients Room: [facility] Safety Check    A safety check occurred in the patient's room between off going nurse and oncoming nurse listed above.     The safety check included the below items  Area Items   H  High Alert Medications - Verify all high alert medication drips (heparin, PCA, etc.)   E  Equipment - Suction is set up for ALL patients (with yanker)  - Red plugs utilized for all equipment (IV pumps, etc.)  - WOWs wiped down at end of shift.  - Room stocked with oxygen, suction, and other unit-specific supplies   A  Alarms - Bed alarm is set for fall risk patients  - Ensure chair alarm is in place and activated if patient is up in a chair   L  Lines - Check IV for any infiltration  - Villatoro bag is empty if patient has a Villatoro   - Tubing and IV bags are labeled   S  Safety   - Room is clean, patient is clean, and equipment is clean. - Hallways are clear from equipment besides carts. - Fall bracelet on for fall risk patients  - Ensure room is clear and free of clutter  - Suction is set up for ALL patients (with fannie)  - Hallways are clear from equipment besides carts.    - Isolation precautions followed, supplies available outside room, sign posted

## 2017-02-16 NOTE — PROGRESS NOTES
OCCUPATIONAL THERAPY DAILY NOTE  Patient Name:Latasha Rashid  Time 1100  Time out 200     Medical Diagnosis:  Left hip fracture  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     Pain at start of tx:10/10  Pain at stop of tx:7/10***    Patient identified with name and :***  Subjective: ***    Objective:    THERAPEUTIC ACTIVITY Daily Assessment         THERAPEUTIC EXERCISE Daily Assessment         IADL Daily Assessment         NMRE Daily Assessment         FEEDING/EATING Daily Assessment          GROOMING Daily Assessment          UPPER BODY BATHING Daily Assessment          LOWER BODY BATHING Daily Assessment       MOBILITY/TRANSFERS Daily Assessment     Pt sit to stand x3 with rolling walker,SBA.      Assessment: ***    Plan of Care: ***    Lorenzo DELGADO  2017

## 2017-02-16 NOTE — ROUTINE PROCESS
SHIFT CHANGE NOTE FOR [facility]    Bedside and Verbal shift change report given to Nathaniel Cronin LPN  (oncoming nurse) by Robbie Adler RN   (offgoing nurse). Report included the following information SBAR, Kardex, MAR and Recent Results.     Situation:   Code Status: Full Code   Reason for Admission: left hip replacement  Hospital Day: 8   Problem List:   Hospital Problems  Date Reviewed: 2/15/2017          Codes Class Noted POA    Encounter for monitoring of theophylline therapy ICD-10-CM: Z51.81, Z79.899  ICD-9-CM: V58.83, V58.69  2/9/2017 Yes    Overview Signed 2/9/2017 12:35 PM by Taisha Jorgensen MD     Theophylline level (2/09/2017) = 21.0             Prerenal azotemia ICD-10-CM: R79.89  ICD-9-CM: 790.6  2/9/2017 Yes    Overview Signed 2/9/2017 12:35 PM by Taisha Jorgensen MD     BUN/Creatinine ratio (2/09/2017) = 30 (BUN 25, Creatinine 0.84)              Chronic pain syndrome (Chronic) ICD-10-CM: G89.4  ICD-9-CM: 338.4  Unknown Yes        Acute blood loss as cause of postoperative anemia ICD-10-CM: D62  ICD-9-CM: 285.1  2/7/2017 Yes        Primary osteoarthritis of left hip (Chronic) ICD-10-CM: M16.12  ICD-9-CM: 715.15  Unknown Yes        Type 2 diabetes mellitus without complication (HCC) (Chronic) ICD-10-CM: E11.9  ICD-9-CM: 250.00  Unknown Yes        Essential hypertension (Chronic) ICD-10-CM: I10  ICD-9-CM: 401.9  Unknown Yes        Decreased calculated glomerular filtration rate (GFR) ICD-10-CM: R94.4  ICD-9-CM: 794.4  2/6/2017 Yes    Overview Signed 2/8/2017  5:12 PM by Taisha Jorgensen MD     Calculated GFR is equivalent to that of CKD stage 2 = 60-89 ml/min             * (Principal)Status post total replacement of left hip ICD-10-CM: V31.517  ICD-9-CM: V43.64  2/6/2017 Yes    Overview Signed 2/8/2017  5:13 PM by Margaret Rogers, MD Dr. Idelia Castleman             Aftercare following left hip joint replacement surgery ICD-10-CM: Z47.1, U39.895  ICD-9-CM: V54.81, V43.64  2/6/2017 Yes    Overview Signed 2/8/2017  5:13 PM by Melissa Donaldson MD     S/P Left total hip replacement (2/6/2017 - Dr. Rossy Alvarado)                   Background:   Past Medical History:   Past Medical History   Diagnosis Date    Acute blood loss as cause of postoperative anemia 2/7/2017    Allergic rhinitis     Anxiety disorder     Bronchial asthma     Cataract of left eye     Chronic [facility]ism ([facility] [address]     Chronic pain syndrome     Decreased calculated glomerular filtration rate (GFR) 2/6/2017     Calculated GFR is equivalent to that of CKD stage 2 = 60-89 ml/min    Depression     Dyslipidemia     Essential hypertension     Gout     History of tobacco abuse     Incisional hernia     Lichen simplex chronicus     Obesity, Class III, BMI 40-49.9 (morbid obesity) ([facility] [address]     Paronychia     Primary osteoarthritis of left hip     Type 2 diabetes mellitus without complication ([facility] [address]       Patient taking anticoagulants yes    Patient has a defibrillator: no     Assessment:   Changes in Assessment throughout shift: No     Patient has central line: no Reasons if yes: Insertion date: Last dressing date:   Patient has Villatoro Cath: no Reasons if yes:     Insertion date:     Last Vitals:     Vitals:    02/14/17 1930 02/15/17 0723 02/15/17 1547 02/15/17 2000   BP: 138/73 130/74 129/71 131/66   Pulse: 87 79 76 80   Resp: 22 20 18 22   Temp: 97.2 °F (36.2 °C) 97.9 °F (36.6 °C) 97.6 °F (36.4 °C) 97.9 °F (36.6 °C)   SpO2: 95% 96% 98% 95%   Weight:       Height:            PAIN    Pain Assessment    Pain Intensity 1: 0 (02/16/17 0000) Pain Intensity 1: 2 (12/29/14 1105)    Pain Location 1: Hip Pain Location 1: Abdomen    Pain Intervention(s) 1: Medication (see MAR) Pain Intervention(s) 1: Medication (see MAR)  Patient Stated Pain Goal: 0 Patient Stated Pain Goal: 0  o Intervention effective: no    o Other actions taken for pain: pain medicine     Skin Assessment  Skin color Skin Color: Appropriate for ethnicity  Condition/Temperature Skin Condition/Temp: Dry, Warm  Integrity Skin Integrity: Incision (comment)  Turgor Turgor: Non-tenting  Weekly Pressure Ulcer Documentation Weekly Pressure Ulcer Documentation: No Pressure Ulcer Noted-Pressure Ulcer Prevention Initiated  Wound Prevention & Protection Methods  Orientation of wound Orientation of Wound Prevention: Posterior  Location of Prevention Location of Wound Prevention: Sacrum/Coccyx  Dressing Present Dressing Present : No  Dressing Status    Wound Offloading Wound Offloading (Prevention Methods): Bed, pressure redistribution/air     INTAKE/OUPUT    Date 02/15/17 0700 - 02/16/17 0659 02/16/17 0700 - 02/17/17 0659   Shift 9418-2732 8888-9465 24 Hour Total 5046-6223 1767-0430 24 Hour Total   I  N  T  A  K  E   P. O. 780  780         P. O. 780  780       Shift Total  (mL/kg) 780  (7.4)  780  (7.4)      O  U  T  P  U  T   Urine  (mL/kg/hr)            Urine Occurrence(s) 3 x 1 x 4 x       Stool            Stool Occurrence(s) 0 x 0 x 0 x       Shift Total  (mL/kg)           780      Weight (kg) 105.7 105.7 105.7 105.7 105.7 105.7       Recommendations:  1. Patient needs and requests: pain medicine    2. Diet: diabetic    3. Pending tests/procedures: No     4. Functional Level/Equipment: 1 x person assist    5. Estimated Discharge Date: TDB Posted on Whiteboard in Patients Room: [facility] Safety Check    A safety check occurred in the patient's room between off going nurse and oncoming nurse listed above.     The safety check included the below items  Area Items   H  High Alert Medications - Verify all high alert medication drips (heparin, PCA, etc.)   E  Equipment - Suction is set up for ALL patients (with yanker)  - Red plugs utilized for all equipment (IV pumps, etc.)  - WOWs wiped down at end of shift.  - Room stocked with oxygen, suction, and other unit-specific supplies   A  Alarms - Bed alarm is set for fall risk patients  - Ensure chair alarm is in place and activated if patient is up in a chair   L  Lines - Check IV for any infiltration  - Villatoro bag is empty if patient has a Villatoro   - Tubing and IV bags are labeled   S  Safety   - Room is clean, patient is clean, and equipment is clean. - Hallways are clear from equipment besides carts. - Fall bracelet on for fall risk patients  - Ensure room is clear and free of clutter  - Suction is set up for ALL patients (with yanker)  - Hallways are clear from equipment besides carts.    - Isolation precautions followed, supplies available outside room, sign posted

## 2017-02-16 NOTE — PROGRESS NOTES
Patient's chair alarm sounds, went to room, found patient standing in front of wheelchair, legs of w/c extended, patient attempting to step over legs of w/c to get to bed. When asked to sit down, patient became verbally abusive to staff. Patient remains resistant to education on falls, stating that she \"has not fallen since she has been here\".

## 2017-02-16 NOTE — PROGRESS NOTES
Problem: Mobility Impaired (Adult and Pediatric)  Goal: *Acute Goals and Plan of Care (Insert Text)  Physical Therapy Short Term Goals  Initiated 2/9/2017 and to be accomplished within 7 day(s) (02/16/2017)  1. Patient will move from supine to sit and sit to supine , scoot up and down and roll side to side in bed with moderate assistance . 2. Patient will transfer from bed to chair and chair to bed with moderate assistance using the least restrictive device and safe technique. 3. Patient will perform sit to stand with minimal assistance/contact guard assist.  4. Patient will ambulate with maximal assistance for 15 feet with the least restrictive device. Physical Therapy Long Term Goals  Initiated 2/9/2017 and to be accomplished within 21 day(s) (03/02/2017)  1. Patient will move from supine to sit and sit to supine , scoot up and down and roll side to side in bed with minimal assistance/contact guard assist.   2. Patient will transfer from bed to chair and chair to bed with minimal assistance/contact guard assist using the least restrictive device. 3. Patient will perform sit to stand with supervision/set-up. 4. Patient will ambulate with moderate assistance for 50 feet with the least restrictive device. 5. Patient will ascend/descend 4 stairs with 2 handrail(s) with moderate assistance . PT WEEKLY PROGRESS NOTE  Patient Name:Latasha Coxiott   Time In: 8048   Time Out: 3960     Subjective: Patient with significant resistance to participation in therapy today requiring max encouragement and education to participate. Objective:      Patient requires significant education only current limiting factors; actions, and beliefs that are hindering her progress in PT. Patient reports that she is prepared to adjust her activities to more appropriately participate in rehabilitative program. Patient with increased participation and output following discussion.  Patient performs functional mobility assessment as noted below. Outcome Measures: FIM/MMT  Pain at start of tx:9/10  Pain at stop of tx:9/10     Patient identified with name and : YES                    AROM: Generally decreased but improved from eval LLE      FIM SCORES Initial Assessment Weekly Progress Assessment 2017   Bed/Chair/Wheelchair Transfers 1 3   Wheelchair Mobility 2 (SPV w/ B UE technique and LE for steering assist) 6   Walking [place] 0 2   Steps/Stairs 0 0   Please see [facility] Interdisciplinary Eval: Coordination/Balance Section for details regarding FIM score description. BED/CHAIR/WHEELCHAIR TRANSFERS Initial Assessment Weekly Progress Assessment 2017   Rolling Right 2 (Maximal assistance) 2 (Maximal assistance)   Rolling Left 1 (Total assistance) (unable to achieve full) 3 (Moderate assistance )   Supine to Sit 1 (Total assistance) (requires trunk and LE assist with HOB elevate >30 deg) 3 (Moderate assistance)   Sit to Stand Moderate assistance Supervision   Sit to Supine 2 (Maximal assistance) (Trunk and B LE assistance) 3 (Moderate assistance)   Transfer Type Other Other   Comments Patient performs stand step transfer requiring mod a x1 to assist with upright posture, B UE support, weight shift assistance, assisted pivot, controlled lowering, and tactile cueing to improve body position. Patient un-receptive to verbal cueing or assistance to improve form/technique. Patient demosntrates standing squat form with combination pivot and lowering to target surface, consistently reaching for external support to pull-to-stand with STS transition and stepping during pivot/step phase of transfer. Patient nont-reeptive to verbal cues to prevent reaching. patient with continued lack of recptiveness to education t/o final 3 transfers in session.  Patient demosntrates ability to perform stand step transfer with SBA only and verbal cueing to improve sequencing x 1 L and R with increased attention to inhibition of attempts to reach for target surface prior to step/pivot   Car Transfer Not tested Not tested   Car Type N/A N/A       GROSS ASSESSMENT Weekly Progress Assessment 2/16/2017   AROM Generally decreased, functional   Strength Generally decreased, functional   Coordination Within functional limits   Tone Normal   Sensation Intact   PROM Generally decreased, functional (2/2 pain)       POSTURE Weekly Progress Assessment 2/16/2017   Posture (WDL) Exceptions to WDL   Posture Assessment Forward head;Rounded shoulders;Trunk flexion       WHEELCHAIR MOBILITY/MANAGEMENT Initial Assessment Weekly Progress Assessment 2/16/2017   Able to Propel 120 feet 250 feet   Curbs/ramps assistance required 0 (Not tested) 1 (Total assistance)   Wheelchair set up assistance required 1 (Dependent/total assistance) Max a x 1   Wheelchair management Manages left brake, Manages right brake Manages left brake;Manages right brake       WALKING INDEPENDENCE Initial Assessment Weekly Progress Assessment 2/16/2017   Assistive device  (N/A) Gait belt (anterior approach from therapist)   Ambulation assistance - level surface N/A Min a x 1   Distance 0 Feet (ft) 58 Feet (ft) (x 2 sets)   Comments Pt unable 58 ft with anterior approach from therapist for B UE support, weight shift assistance, postural and step cues       GAIT Weekly Progress Assessment 2/16/2017   Gait Description (WDL) Exceptions to WDL   Gait Abnormalities Antalgic;Circumduction;Decreased step clearance;Shuffling gait; Step to gait       STEPS/STAIRS Initial Assessment Weekly Progress Assessment 2/16/2017   Steps/Stairs ambulated 0 0   Rail Use  (N/A)  (Pt unable)   Comments Pt unable Pt unable   Curbs/Ramps N/A Unable              Assessment: All STG/LTG met at this time except LTG 5. Updated to demonstrate patient progression. Plan of Care: Please see Care Plan for updated LTGs. Family Training: TBD Tori Severs.  Zapeduardo, PT DPT  2/16/2017

## 2017-02-16 NOTE — PROGRESS NOTES
Patient continues to get up from wheelchair on own.  Therapy and nursing have instructed patient to call before getting out of bed or wheelchair

## 2017-02-16 NOTE — PROGRESS NOTES
OCCUPATIONAL THERAPY DAILY NOTE  Patient Name:Latasha Rashid  Time Spent With Patient  Time In: 80  Time Out: 200    Medical Diagnosis:  Left hip fracture  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     Pain at start of tx: 10/10  Pain at stop of tx: 7/10    Patient identified with name and : Yes    Subjective: Pt c/o pain in L knee. Pt reports her knees was feeling better after donning the breann stocking. Objective:    THERAPEUTIC ACTIVITY Daily Assessment    Pt participated with chinese  pegboard in dynamic stands with rolling walker for 7:16 minutes, SBA. Pt needed min verbal cuing to strategies movement to have the lease amount of pegs left on board. THERAPEUTIC EXERCISE Daily Assessment    With on supportive sitting in wheel chair pt performs UE strengthening with 3# dowel. Pt preforms 2/10 reps in all plane with rest between sets and challenge to one rep of each set without rest.       LOWER BODY DRESSING Daily Assessment    Lower Body Dressing   Don/Doff Anti-Embolic Stockings: 3 (Moderate assistance)  Comments: Mod assist donning breann 2/2 pt hip precaution. However pt assist with donning stocking from knee to thigh in standing. Pt needed to rest 2/2 fatigue after donning on R LE before donning stocking onto L L.E.        MOBILITY/TRANSFERS Daily Assessment      Pt sit to stand x3 with rolling walker,SBA. with stand to sit pt needed a few minutes before sitting 2/2 her R knee was lock. Assessment: Pt tolerate activity standing tolerance with therapeutic activity.     Plan of Care: Con't plan of care     Argentina DELGADO  2017

## 2017-02-16 NOTE — PROGRESS NOTES
OT WEEKLY PROGRESS NOTE  Patient Name:Latasha Rashid   Time Spent With Patient  Time In: 1100  Time Out: 200    Medical Diagnosis:  Left hip fracture  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     Pain at start of tx:10/10  Pain at stop of tx:7/10    Patient identified with name and : Yes    Subjective: Pt c/o pain in L knee. Pt reports her knees was feeling better after donning the breann stocking.             Objective: Pt participated with chinese  pegboard in dynamic stands with rolling walker for 7:16 minutes, SBA. Pt needed min verbal cuing to strategies movement to have the lease amount of pegs left on board. With unsupportive sitting in wheel chair pt performs UE strengthening with 3# dowel. Pt preforms 2/10 reps in all plane with rest between sets and challenge to one rep of each set without rest.  Pt sit to stand x3 with rolling walker,SBA.  with stand to sit pt needed a few minutes before sitting 2/2 her R knee was lock    Outcome Measures: Pt to d/c home      AROM: B  UE WFL      COGNITION/PERCEPTION Initial Assessment Weekly Progress Assessment 2017   Premorbid Reading Status Literate Literate   Premorbid Writing Status  (NT) NT   Arousal/Alertness Generalized responses Generalized responses   Orientation Level Oriented X4 Oriented X4   Visual Fields  [facility]) WFL   Praxis Intact intact   Body Scheme Appears intact intact   COMPREHENSION MODE Initial Assessment Weekly Progress Assessment 2017   Primary Mode of Comprehension Auditory Auditory   Hearing Aide None None   Corrective Lenses Reading glasses     Score 6 6     EXPRESSION Initial Assessment Weekly Progress Assessment 2017   Primary Mode of Expression Verbal Verbal   Score 6 7   Comments         SOCIAL INTERACTION/ PRAGMATICS Initial Assessment Weekly Progress Assessment 2017   Score 6 7   Comments         PROBLEM SOLVING Initial Assessment Weekly Progress Assessment 2017 Score 5 5   Comments         MEMORY Initial Assessment Weekly Progress Assessment 2/16/2017   Score 5 6   Comments         EATING Initial Assessment Weekly Progress Assessment 2/16/2017   Functional Level 5 7   Comments Pt requires set-up  for opening some containers 2/2 decreased hand strength. Pt has partial dental fixtures. Feeding/Eating  Feeding/Eating Assistance: 7 (Independent)     GROOMING Initial Assessment Weekly Progress Assessment 2/16/2017   Functional Level 5 7   Tasks completed by patient Washed face, Brushed teeth    Comments Pt was able to perform grroming task with set-up of items including assistance to open toothbrush packaging. Grooming  Grooming Assistance : 7 (Independent     BATHING Initial Assessment Weekly Progress Assessment 2/16/2017   Functional Level 1 (Pt stood in shower at home.)    Body parts patient bathed Abdomen, Arm, left, Arm, right, Chest, Buttocks, Carmine area    Comments Pt able to wash UB while seated with set-up of needed items. Pt able to performed half stand with support of counter to bathe carmine/buttocks. Pt required assistance with bathing LEs 2/2 THPs. Upper Body Bathing  Bathing Assistance, Upper: 6 (Modified independent)  Position Performed: Seated in chair washing hair and upper body. Lower Body Bathing  Bathing Assistance, Lower : 5 (Stand-by assistance)  Adaptive Equipment: Grab bar;Long handled sponge  Position Performed: Seated in chair;Standing  Comments: Pt used LH sponge for LE from seated position, req'd min assist to wash between toes 2/2 hip precaution. Then stood with and support self on grab bars while washing carmine area and buttocks independently, SBA      TUB/SHOWER TRANSFER INDEPENDENCE Initial Assessment Weekly Progress Assessment 2/16/2017   Score 0 4   Comments NT 2/2 pt bathed at sink this session.    Tub or Shower Type: Shower  Amount of Assistance Required: 4 (Contact guard assistance with t/f to and from wheel chair to shower bench,min verbal cuing for proper hand placement with sit to stand.)  Adaptive Equipment: Grab bars     UPPER BODY DRESSING/UNDRESSING Initial Assessment Weekly Progress Assessment 2/16/2017   Functional Level 5 6   Items applied/Steps completed Pullover (4 steps)    Comments Set-up to provide pt clothing. Pt able to nany/doff pullover shirt while seated. Upper Body Dressing   Dressing Assistance : 6 (Modified independent)  Comments: From seated position pt nany sport bra and T- shirt independently     LOWER BODY DRESSING/UNDRESSING Initial Assessment Weekly Progress Assessment 2/16/2017   Functional Level 2 3   Items applied/Steps completed Sock, left (1 step), Sock, right (1 step), Elastic waist pants (3 steps)    Comments Pt required assistance to thread pants over LEs and socks over feet. pt stood with RW/min A to nany pants over hips with assistance for back of pants. Lower Body Dressing   Don/Doff Anti-Embolic Stockings: 3 (Moderate assistance)  Comments: Mod assist donning breann 2/2 pt hip precaution. However pt assist with donning stocking from knee to thigh in standing. Pt needed to rest 2/2 fatigue after donning on R LE before donning stocking onto L L.E..     Lower Body Dressing   Dressing Assistance : 4 (Minimal assistance)  Leg Crossed Method Used: No  Position Performed: Seated in chair;Standing  Adaptive Equipment Used: Reacher;Sock aid; Walker  Comments: Re-ed with reacher to assist with threading L LE into pants legs. Then donning sock with sock aid, stood with rolling walker donning panties and pants over hips/ buttocks independent while alternating hands on walker,CGA with standing 2/14     TOILETING Initial Assessment Weekly Progress Assessment 2/16/2017   Functional Level 3 6   Comments Pt is able to manage hygiene while seated and stood for clothing management with min A/RW. OT assisted with managing pants over pt's buttocks.  Toileting  Toileting Assistance (FIM Score): 6 (Modified independent)  Adaptive Equipment: Grab bars     TOILET TRANSFER INDEPENDENCE Initial Assessment Weekly Progress Assessment 2/16/2017   Transfer score 3 4   Comments Pt required min A for stand step transfer from w/c to/from toilet with RW. Pt required mod A for sit to stand from toilet. OT will place [facility] over toilet for increased independence with sit to stand transfers from toilet. Functional Transfers  Toilet Transfer : Grab bars;Stand pivot transfer without device  Amount of Assistance Required: 4 (Contact guard assistance)          Assessment: Pt tolerate activity standing tolerance with therapeutic activity. Pt met 8/9 STG's(1-6,8-9)         Plan of Care: Please see Care Plan for updated LTGs. Family Training:  To be schedulet    Argentina DELGADO  2/16/2017

## 2017-02-17 VITALS
WEIGHT: 233 LBS | OXYGEN SATURATION: 91 % | BODY MASS INDEX: 41.29 KG/M2 | SYSTOLIC BLOOD PRESSURE: 123 MMHG | HEIGHT: 63 IN | HEART RATE: 83 BPM | TEMPERATURE: 97.7 F | RESPIRATION RATE: 20 BRPM | DIASTOLIC BLOOD PRESSURE: 71 MMHG

## 2017-02-17 LAB
GLUCOSE BLD STRIP.AUTO-MCNC: 109 MG/DL (ref 70–110)
THEOPHYLLINE SERPL-MCNC: 7 UG/ML

## 2017-02-17 PROCEDURE — 74011250637 HC RX REV CODE- 250/637: Performed by: INTERNAL MEDICINE

## 2017-02-17 PROCEDURE — 74011250636 HC RX REV CODE- 250/636: Performed by: INTERNAL MEDICINE

## 2017-02-17 PROCEDURE — 82962 GLUCOSE BLOOD TEST: CPT

## 2017-02-17 PROCEDURE — 97535 SELF CARE MNGMENT TRAINING: CPT

## 2017-02-17 RX ADMIN — ENOXAPARIN SODIUM 40 MG: 100 INJECTION SUBCUTANEOUS at 05:49

## 2017-02-17 RX ADMIN — THEOPHYLLINE 200 MG: 200 TABLET, EXTENDED RELEASE ORAL at 08:19

## 2017-02-17 RX ADMIN — Medication 250 MG: at 08:18

## 2017-02-17 RX ADMIN — SERTRALINE HYDROCHLORIDE 100 MG: 50 TABLET ORAL at 08:19

## 2017-02-17 RX ADMIN — AMLODIPINE BESYLATE 5 MG: 5 TABLET ORAL at 08:20

## 2017-02-17 RX ADMIN — METFORMIN HYDROCHLORIDE 1000 MG: 500 TABLET, FILM COATED ORAL at 08:18

## 2017-02-17 RX ADMIN — ALLOPURINOL 300 MG: 100 TABLET ORAL at 08:18

## 2017-02-17 RX ADMIN — BUSPIRONE HYDROCHLORIDE 20 MG: 5 TABLET ORAL at 08:19

## 2017-02-17 RX ADMIN — THERA TABS 1 TABLET: TAB at 08:18

## 2017-02-17 RX ADMIN — CELECOXIB 100 MG: 100 CAPSULE ORAL at 08:19

## 2017-02-17 RX ADMIN — SITAGLIPTIN 100 MG: 50 TABLET, FILM COATED ORAL at 08:19

## 2017-02-17 RX ADMIN — Medication 325 MG: at 08:19

## 2017-02-17 RX ADMIN — OXYCODONE HYDROCHLORIDE AND ACETAMINOPHEN 2 TABLET: 7.5; 325 TABLET ORAL at 08:43

## 2017-02-17 RX ADMIN — LORATADINE 10 MG: 10 TABLET ORAL at 08:20

## 2017-02-17 RX ADMIN — VITAMIN D, TAB 1000IU (100/BT) 2000 UNITS: 25 TAB at 08:19

## 2017-02-17 RX ADMIN — FAMOTIDINE 20 MG: 20 TABLET ORAL at 08:18

## 2017-02-17 NOTE — PROGRESS NOTES
Sw left a message for pt's  to advise that pt elected to sign out AMA today. Sw will remain available if insurance has more questions.

## 2017-02-17 NOTE — DISCHARGE SUMMARY
[address]  [address] [place] [address] [address]     INPATIENT REHABILITATION  DISCHARGE SUMMARY    Name: Mendy Brumfield MRN: [id number]   Age / Sex: 64 y.o. / female CSN: [id number]   YOB: 1955 Length of Stay: 9 days   Admit Date: 2/8/2017 Discharge Date: 2/17/2017       PRIMARY CARE PHYSICIAN: Hortensia Michael NP      DISCHARGE DIAGNOSES:    Primary Rehab Diagnosis  1. Impaired Mobility and ADLs  2. Primary osteoarthritis of the left hip  3. S/P Left total hip replacement (2/6/2017 - Dr. Jamel Herrera)      Comorbidities   Bronchial asthma    Type 2 diabetes mellitus without complication    Essential hypertension    Depression    Dyslipidemia    Decreased calculated glomerular filtration rate (GFR)    Aftercare following left hip joint replacement surgery    Acute blood loss as cause of postoperative anemia    Gout    Allergic rhinitis    Cataract of left eye    Incisional hernia    Lichen simplex chronicus    Paronychia    Chronic alcoholism    Tobacco abuse    Chronic pain syndrome    Obesity, Class III, BMI 40-49.9 (morbid obesity)     Anxiety disorder    Encounter for monitoring of theophylline therapy    Prerenal azotemia       CONSULTS CALLED: None      PROCEDURES DONE: None      BRIEF HISTORY: The patient is a 49-year-old obese, Black female with multiple medical comorbidities who was admitted to [facility] on 2/06/2017 for an elective left total hip replacement. The patient has had chronic left hip pain for months and she had failed conservative measures so surgery was recommended. The patient was admitted under the service of Orthopedics (Dr. Jamel Herrera) and the patient underwent a Left total hip replacement on 2/6/2017 done by Dr. Jamel Herrera. The patient tolerated the procedure well with no intraoperative complications. Enoxaparin was given for DVT prophylaxis.  Oral narcotics were given for analgesia. The patient developed acute postoperative blood loss anemia but no blood transfusion was given. The [place] [address] Hospitalist Team (Dr. Angela Armando) was called for management of the patient's medical issues. Nebulizations were given for the bronchial asthma. The patient had remained hemodynamically stable but due to the above events, the patient was noted to be generally weak and with impaired mobility and ADLs. Patient was felt to be a good candidate for acute inpatient rehabilitation. Upon evaluation by Physical Therapy and Occupational Therapy, the patient was recommended for acute inpatient rehabilitation. The patient was discharged and was subsequently admitted to the [facility] for intensive rehabilitation to help recover strength, function and mobility. COURSE IN THE HOSPITAL: Upon admission to the [facility], the patient underwent physical therapy, occupational therapy and speech therapy. The patient was able to actively participate in the rehabilitation activities and progressed well. On discharge, the patient was able to perform the following activities:    1. Occupational Therapy    ON ADMISSION ON DISCHARGE   Eating  Functional Level: 5   Eating  Functional Level: 5     Grooming  Functional Level: 5   Grooming  Functional Level: 5     Bathing  Functional Level: 1 (Pt stood in shower at home.)   Bathing  Functional Level: 1 (Pt stood in shower at home.)     Upper Body Dressing  Functional Level: 5   Upper Body Dressing  Functional Level: 5     Lower Body Dressing  Functional Level: 2   Lower Body Dressing  Functional Level: 2     Toileting  Functional Level: 2   Toileting  Functional Level: 4     Toilet Transfers  Toilet Transfer Score: 0   Toilet Transfers  Toilet Transfer Score: 3     Tub /Shower Transfers  Tub/Shower Transfer Score: 0   Tub/Shower Transfers  Tub/Shower Transfer Score: 0       2.  Physical Therapy    ON ADMISSION ON DISCHARGE   Wheelchair Mobility/Management  Able to Propel (ft): 120 feet  Functional Level: 2 (SPV w/ B UE technique and LE for steering assist)  Curbs/Ramps Assist Required (FIM Score): 0 (Not tested)  Wheelchair Setup Assist Required : 1 (Dependent/total assistance)  Wheelchair Management: Manages left brake, Manages right brake Wheelchair Mobility/Management  Able to Propel (ft): 250 feet  Functional Level: 6  Curbs/Ramps Assist Required (FIM Score): 1 (Total assistance)  Wheelchair Setup Assist Required : 2 (Maximal assistance) (for leg rest set-up)  Wheelchair Management: Manages left brake, Manages right brake     Gait  Amount of Assistance:  (Pt unable)  Distance (ft): 0 Feet (ft)  Assistive Device:  (N/A) Gait  Amount of Assistance: 4 (Minimal assistance)  Distance (ft): 58 Feet (ft) (x 2 sets)  Assistive Device: Gait belt (anterior approach from therapist)     Balance-Sitting/Standing  Sitting - Static: Fair (occasional)  Sitting - Dynamic: Fair (occasional)  Standing - Static: Fair  Standing - Dynamic : Impaired Balance-Sitting/Standing  Sitting - Static: Good (unsupported)  Sitting - Dynamic: Good (unsupported)  Standing - Static: Fair  Standing - Dynamic : Impaired     Bed/Mat Mobility  Rolling Right : 2 (Maximal assistance)  Rolling Left : 1 (Total assistance) (unable to achieve full)  Supine to Sit : 1 (Total assistance) (requires trunk and LE assist with HOB elevate >30 deg)  Sit to Supine : 2 (Maximal assistance) (Trunk and B LE assistance) Bed/Mat Mobility  Rolling Right : 2 (Maximal assistance)  Rolling Left : 3 (Moderate assistance )  Supine to Sit : 3 (Moderate assistance)  Sit to Supine : 3 (Moderate assistance)     Transfers  Transfer Type: Other  Other: Patient performs stand step transfer requiring mod a x1 to assist with upright posture, B UE support, weight shift assistance, assisted pivot, controlled lowering, and tactile cueing to improve body position.  Patient un-receptive to verbal cueing or assistance to improve form/technique. Patient demosntrates standing squat form with combination pivot and lowering to target surface, consistently reaching for external support to pull-to-stand with STS transition and stepping during pivot/step phase of transfer. Patient nont-reeptive to verbal cues to prevent reaching. patient with continued lack of recptiveness to education t/o final 3 transfers in session. Transfer Assistance : 3 (Moderate assistance )  Sit to Stand Assistance: Moderate assistance  Car Transfers: Not tested  Car Type: N/A Transfers  Transfer Type: Other  Other: Patient demosntrates ability to perform stand step transfer with SBA only and verbal cueing to improve sequencing x 1 L and R with increased attention to inhibition of attempts to reach for target surface prior to step/pivot  Transfer Assistance : 5 (Stand-by assistance)  Sit to Stand Assistance: Supervision  Car Transfers: Not tested  Car Type: N/A     Steps or Stairs  Steps/Stairs Ambulated (#): 0  Level of Assist : 0 (Not tested)  Rail Use:  (N/A) Steps or Stairs  Steps/Stairs Ambulated (#): 0  Level of Assist : 0 (Not tested)  Rail Use:  (Pt unable)       3.  Speech and Language Pathology    ON ADMISSION ON DISCHARGE   Comprehension (Native Language)  Primary Mode of Comprehension: Auditory  Score: 7 Comprehension (Native Language)  Primary Mode of Comprehension: Auditory  Score: 7     Expression (Native Language)  Primary Mode of Expression: Verbal  Score: 7   Expression (Native Language)  Primary Mode of Expression: Verbal  Score: 7     Social Interaction/Pragmatics  Score: 7 Social Interaction/Pragmatics  Score: 7     Problem Solving  Score: 7   Problem Solving  Score: 5     Memory  Score: 7 Memory  Score: 6       Legend:   7 - Independent   6 - Modified Independent   5 - Standby Assistance / Supervision / Set-up   4 - Minimum Assistance / Contact Guard Assistance   3 - Moderate Assistance   2 - Maximum Assistance   1 - Total Assistance / Dependent       ACUTE MEDICAL ISSUES ADDRESSED IN INPATIENT REHABILITATION FACILITY:     > Primary osteoarthritis of the left hip; S/P Left total hip replacement (2/6/2017 - Dr. Yesi Farley)   > Weightbearing as tolerated on the left lower extremity   > Total hip precautions on the left hip    > Staples to be removed on 2/20/2017      > Acute Postoperative Blood Loss Anemia   > Hgb/Hct (2/09/2017, on admission) = 9.9/29.6   > Anemia work-up showed serum iron 16, TIBC 210, iron % saturation 8, ferritin 215, reticulocyte count 1.3   > Patient was started on FeSO4 and Ascorbic acid   > Continue:    > FeSO4 325 mg PO once daily with breakfast     > Ascorbic Acid 250 mg PO once daily with breakfast (to enhance the absorption of the FeSO4)      > Anxiety / Depression   > Patient was discharged from [facility] on:    > Diazepam 10 mg PO q 6 hr PRN for anxiety    > Buspirone 30 mg PO daily    > Perphenazine 4 mg PO BID    > Sertraline 100 mg PO once daily   > Confirmed psychiatric medications from [address] [place] [address] as follows:    > Diazepam 10 mg PO TID PRN    > Buspirone 30 mg PO 4 times daily    > Perphenazine 2 mg PO q HS    > Sertraline 100 mg PO BID   > On 2/10/2017:    > Held Diazepam 10 mg PO TID PRN    > Changed Buspirone from 30 mg PO daily to 20 mg PO TID    > Decreased Perphenazine from 4 mg PO BID to 2 mg PO q HS    > Added:     > Modafinil 100 mg PO q 8AM     > Melatonin 3 mg PO q HS   > On 2/15/2017, discontinued Modafinil 100 mg PO q 8AM   > Continue:    > Buspirone 20 mg PO TID    > Perphenazine 2 mg PO q HS    > Sertraline 100 mg PO once daily    > Melatonin 3 mg PO q HS     > Bronchial asthma   > On 2/09/2017, discontinued:    > Arformoterol nebulization BID    > Budesonide nebulization BID    > Theophylline level (2/09/2017) = 21.0 (N.V. = 5-15)    > Hold Theophylline  mg PO q 12 hr   > On 2/10/2017, patient was started on Symbicort 160-4.5 2 puffs inhaled BID   > On 2/13/2017, resumed Theophylline ER at a decreased dose of 200 mg PO q 12 hr   > Theophylline level (2/16/2017) = 7.0 (N.V. = 5-15)   > Continue:    > Symbicort 160-4.5 2 puffs inhaled BID    > Theophylline ER at a decreased dose of 200 mg PO q 12 hr    > Albuterol nebulization q 4 hr PRN for shortness of breath      > Essential hypertension   > Continue Amlodipine 5 mg PO once daily (9AM)      > Type 2 diabetes mellitus   > On 2/10/2017:    > Added Lantus 10 units SC q HS    > Increased Metformin from 500 mg to 1,000 mg PO BID with meals   > On 2/15/2017:    > Discontinued Lantus 10 units SC q HS    > Added Sitagliptin 100 mg PO once daily    > Continue:    > Metformin 1,000 mg PO BID with meals    > Sitagliptin 100 mg PO once daily    > Humalog insulin sliding scale SC TID AC only      > Prerenal azotemia    > BUN/Creatinine ratio (2/09/2017) = 30 (BUN 25, Creatinine 0.84)    > BUN/Creatinine ratio (2/13/2017) = 27 (BUN 20, Creatinine 0.74)    > Increase oral fluid intake      > Chronic pain syndrome   > Continue:    > Acetaminophen 650 mg PO q 4 hr PRN for pain level less than 5/10    > Celecoxib 100 mg PO BID PC    > Fentanyl 50 mcg/hr patch, 1 patch on skin q 72 hr     > Percocet 7.5/325 1-2 tabs PO q 4 hr PRN for breakthrough pain level greater than 4/10 (from 8PM to 4AM only)      > Difficulty sleeping    > Continue:    > Melatonin 3 mg PO q HS    > Temazepam 15 mg PO q HS     > Vitamin D 25-Hydroxy (2/9/2017) = 30.2   > Patient was started on Cholecalciferol 2,000 units PO once daily      MEDICATIONS ON DISCHARGE:    Current Discharge Medication List      CONTINUE these medications which have NOT CHANGED    Details   perphenazine (TRILAFON) 2 mg tablet Take 2 mg by mouth nightly. oxyCODONE IR (OXY-IR) 15 mg immediate release tablet Take 15 mg by mouth three (3) times daily as needed for Pain.  Indications: Pain      fentaNYL (DURAGESIC) 50 mcg/hr PATCH 1 Patch by TransDERmal route every seventy-two (72) hours. Indications: Chronic Pain with Opioid Tolerance      ibuprofen (MOTRIN) 800 mg tablet Take 800 mg by mouth three (3) times daily as needed for Pain. Indications: OSTEOARTHRITIS      albuterol (VENTOLIN HFA) 90 mcg/actuation inhaler Take 2 Puffs by inhalation every four (4) hours as needed for Wheezing or Shortness of Breath. Indications: Acute Asthma Attack      diazepam (VALIUM) 10 mg tablet Take 10 mg by mouth three (3) times daily as needed for Anxiety. Indications: ANXIETY      amLODIPine (NORVASC) 5 mg tablet Take 5 mg by mouth daily. Indications: hypertension      ranitidine (ZANTAC) 150 mg tablet Take 150 mg by mouth two (2) times a day. Indications: PREVENTION OF STRESS ULCER      cetirizine (ZYRTEC) 10 mg tablet Take 10 mg by mouth daily. Indications: ALLERGIC RHINITIS      metFORMIN (GLUCOPHAGE) 1,000 mg tablet Take 1,000 mg by mouth two (2) times daily (with meals). Indications: type 2 diabetes mellitus      busPIRone (BUSPAR) 30 mg tablet Take 30 mg by mouth four (4) times daily. Indications: GENERALIZED ANXIETY DISORDER      simvastatin (ZOCOR) 20 mg tablet Take 20 mg by mouth nightly. Indications: DYSLIPIDEMIA      theophylline ER,12 hour, (THEOCHRON) 300 mg tablet Take 300 mg by mouth two (2) times a day. Indications: BRONCHIAL ASTHMA      sertraline (ZOLOFT) 100 mg tablet Take 100 mg by mouth two (2) times a day. Indications: ANXIETY WITH DEPRESSION      allopurinol (ZYLOPRIM) 300 mg tablet Take 300 mg by mouth daily. Indications: GOUT      diphenhydrAMINE (BENADRYL) 25 mg capsule Take 25 mg by mouth every six (6) hours as needed for Skin Irritation. Indications: PRURITUS OF SKIN      nitroglycerin (NITROSTAT) 0.4 mg SL tablet 0.4 mg by SubLINGual route every five (5) minutes as needed for Chest Pain.      multivitamin, tx-iron-ca-min (THERA-M W/ IRON) 9 mg iron-400 mcg tab tablet Take 1 Tab by mouth daily.  Indications: VITAMIN DEFICIENCY PREVENTION         Creatinine Clearance ([facility] [place]): On admission, estimated creatinine clearance was 81.8 ml/min (based on Cr of 0.84). Most recent estimated creatinine clearance is 92.9 mL/min (based on Cr of 0.74). DISCHARGE VITAL SIGNS:  Visit Vitals    /71    Pulse 83    Temp 97.7 °F (36.5 °C)    Resp 20    Ht 5' 3\" (1.6 m)    Wt 105.7 kg (233 lb)    SpO2 91%    BMI 41.27 kg/m2       LAST PHYSICAL EXAMINATION PRIOR TO DISCHARGE AGAINST MEDICAL ADVICE (2/16/2017):  GENERAL SURVEY: Patient is awake, alert, oriented x 3, sitting comfortably on the chair, not in acute respiratory distress. HEENT: pale palpebral conjunctivae, anicteric sclerae, no nasoaural discharge, moist oral mucosa  NECK: supple, no jugular venous distention, no palpable lymph nodes  CHEST/LUNGS: symmetrical chest expansion, good air entry, clear breath sounds  HEART: adynamic precordium, good S1 S2, no S3, regular rhythm, no murmurs  ABDOMEN: obese, bowel sounds appreciated, soft, non-tender  EXTREMITIES: pale nailbeds, no edema, full and equal pulses, no calf tenderness   NEUROLOGICAL EXAM: The patient is awake, alert and oriented x3, able to answer questions fairly appropriately, able to follow 1 and 2 step commands. Able to tell time from the wall clock. Cranial nerves II-XII are grossly intact. No gross sensory deficit. Motor strength is 4-/5 on BUE, 4-/5 on the RLE, 1/5 on the left hip, 3/5 on the left knee, 3+/5 on the left ankle.      Incision(s): covered, clean, dry, and intact       CONDITION ON DISCHARGE: NA      DISPOSITION: Patient clinically improved but was fed up with being told what to do and that she wanted to go home. She said she has medications at home and she can get whatever equipment she would need. She signed the [place] form.       FOLLOW-UP RECOMMENDATIONS: NA      OTHER INSTRUCTIONS: Since patient is leaving [address], patient was told that she will not be prescribed any medications or equipment. If she needed anything, she needs to go to the Emergency Room or to contact her PCP Ceci Pavon NP) or her Orthopedic Surgeon (Dr. Johnie Alejandra). TIME SPENT ON DISCHARGE ACTIVITIES: Less than 30 minutes.       Signed:  Eulice Dance, MD    2/17/2017

## 2017-02-17 NOTE — ROUTINE PROCESS
Bedside shift change report given to Jackeline Oconnor RN (oncoming nurse) by Arcenio Cardona (offgoing nurse). Report given with SBAR, Kardex, Intake/Output, MAR and Recent Results.

## 2017-02-17 NOTE — PROGRESS NOTES
Problem: Self Care Deficits Care Plan (Adult)  Goal: *Therapy Goal (Edit Goal, Insert Text)  Long Term Goals (to be met upon discharge date) in order to increase pts functional independence and safety, and decrease burden of care:  1. Pt will perform self-feeding with modified independence. 2. Pt will perform grooming with modified independence. 3. Pt will perform UB bathing with modified independence. 4. Pt will perform LB bathing with modified independence. 5. Pt will perform shower transfer with supervision. 6. Pt will perform UB dressing with independence. 7. Pt will perform LB dressing with modified independence. 8. Pt will perform toileting task with modified independence. 9. Pt will perform toilet transfer with modified independence. 10. Pt will perform an IADL task while standing with supervision. Short Term Weekly Goals for (2/9/17 to 2/16/17) in order to increase pts functional independence and safety, and decrease burden of care:  1. Pt will perform self-feeding with modified independence. 2. Pt will perform grooming with modified independence. 3. Pt will perform UB bathing with modified independence. 4. Pt will perform LB bathing with min A.  5. Pt will perform shower transfer with min A.  6. Pt will perform UB dressing with independence. 7. Pt will perform LB dressing with min A.  8. Pt will perform toileting task with min A.  9. Pt will perform toilet transfer with min A.   OCCUPATIONAL THERAPY DAILY NOTE  Patient Name:Latasha Rashid  Time Spent With Patient  Time In: 0505  Time Out: 200     Medical Diagnosis:  Left hip fracture  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   Subjective: \" I'm leaving now. \"     Objective: Pt elected to discharge AMA during OT tx. Please see previous ADL note for current levels on bathing and dressing.        LOWER BODY DRESSING Daily Assessment     Lower Body Dressing   Dressing Assistance : 4 (Minimal assistance)  Position Performed: Seated edge of bed  Comments: Pt donned RUIZ hose with sock aid and min A       MOBILITY/TRANSFERS Daily Assessment      w/c to bed CGA without AD      Assessment: Pt missed 2 1/2 units of OT tx.       Plan of Care: Continue with EVERETT Cartwright  2/17/2017

## 2017-02-17 NOTE — ROUTINE PROCESS
Patient refused care want to go home. Patient signed AMA form. Dr. Partha Herndon spoke to and notified patient.

## 2017-02-17 NOTE — PROGRESS NOTES
Problem: Mobility Impaired (Adult and Pediatric)  Goal: *Acute Goals and Plan of Care (Insert Text)  Physical Therapy Short Term Goals  Initiated 2/9/2017 and to be accomplished within 7 day(s) (02/16/2017)  1. Patient will move from supine to sit and sit to supine , scoot up and down and roll side to side in bed with moderate assistance . 2. Patient will transfer from bed to chair and chair to bed with moderate assistance using the least restrictive device and safe technique. 3. Patient will perform sit to stand with minimal assistance/contact guard assist.  4. Patient will ambulate with maximal assistance for 15 feet with the least restrictive device. Physical Therapy Long Term Goals  Initiated 2/9/2017 and to be accomplished within 21 day(s) (03/02/2017)  1. Patient will move from supine to sit and sit to supine , scoot up and down and roll side to side in bed with minimal assistance/contact guard assist.   2. Patient will transfer from bed to chair and chair to bed with minimal assistance/contact guard assist using the least restrictive device. 3. Patient will perform sit to stand with supervision/set-up. 4. Patient will ambulate with moderate assistance for 50 feet with the least restrictive device. 5. Patient will ascend/descend 4 stairs with 2 handrail(s) with moderate assistance . Patient discharges from care AMA; for functional mobility scores please reference most recent treatment note.      Michael Harrington PT DPT  02/17/2017 1640

## 2017-07-18 NOTE — PROGRESS NOTES
Pt attended pre-op joint class today. Pt plans for total knee replacement with Dr. Lori Lai on 7/24/17. Pt lives in 1 story home with cousin who is hearing impaired. Pt requesting personal care aide post-op. Stats she had one over a year ago but has been has not been approved to have one since then. Pt has 3 steps to enter home with railing. Pt has [employer]. Pt states she went to [address] [place] [address] , but refuses to go back if SNF is needed. Plans to D/C home with home health. Pt has rolling walker, cane, and bedside commode at home. Pt has history of falls. Pt states she has fallen 2-3 times related to her knee buckling.

## 2017-07-19 RX ORDER — FLUOXETINE HYDROCHLORIDE 40 MG/1
60 CAPSULE ORAL DAILY
COMMUNITY

## 2017-07-19 RX ORDER — FLUTICASONE PROPIONATE 50 MCG
SPRAY, SUSPENSION (ML) NASAL DAILY
COMMUNITY

## 2017-07-19 RX ORDER — QUETIAPINE FUMARATE 50 MG/1
50 TABLET, FILM COATED ORAL
COMMUNITY

## 2017-07-20 ENCOUNTER — HOSPITAL ENCOUNTER (OUTPATIENT)
Dept: LAB | Age: 62
Discharge: HOME | End: 2017-07-20
Payer: MEDICAID

## 2017-07-20 ENCOUNTER — HOSPITAL ENCOUNTER (OUTPATIENT)
Dept: LAB | Age: 62
Discharge: HOME | End: 2017-07-20

## 2017-07-20 DIAGNOSIS — Z01.818 PRE-OP TESTING: ICD-10-CM

## 2017-07-20 LAB — SENTARA SPECIMEN COL,SENBCF: NORMAL

## 2017-07-20 PROCEDURE — 99001 SPECIMEN HANDLING PT-LAB: CPT | Performed by: ORTHOPAEDIC SURGERY

## 2017-07-20 PROCEDURE — 93005 ELECTROCARDIOGRAM TRACING: CPT

## 2017-07-21 ENCOUNTER — ANESTHESIA EVENT (OUTPATIENT)
Dept: SURGERY | Age: 62
DRG: 302 | End: 2017-07-21
Payer: MEDICAID

## 2017-07-21 LAB
ATRIAL RATE: 73 BPM
CALCULATED P AXIS, ECG09: 76 DEGREES
CALCULATED R AXIS, ECG10: 20 DEGREES
CALCULATED T AXIS, ECG11: 53 DEGREES
DIAGNOSIS, 93000: NORMAL
P-R INTERVAL, ECG05: 136 MS
Q-T INTERVAL, ECG07: 390 MS
QRS DURATION, ECG06: 88 MS
QTC CALCULATION (BEZET), ECG08: 429 MS
VENTRICULAR RATE, ECG03: 73 BPM

## 2017-07-24 ENCOUNTER — HOSPITAL ENCOUNTER (INPATIENT)
Age: 62
LOS: 2 days | Discharge: HOME HEALTH | DRG: 302 | End: 2017-07-26
Attending: ORTHOPAEDIC SURGERY | Admitting: ORTHOPAEDIC SURGERY
Payer: MEDICAID

## 2017-07-24 ENCOUNTER — APPOINTMENT (OUTPATIENT)
Dept: GENERAL RADIOLOGY | Age: 62
DRG: 302 | End: 2017-07-24
Attending: ORTHOPAEDIC SURGERY
Payer: MEDICAID

## 2017-07-24 ENCOUNTER — ANESTHESIA (OUTPATIENT)
Dept: SURGERY | Age: 62
DRG: 302 | End: 2017-07-24
Payer: MEDICAID

## 2017-07-24 DIAGNOSIS — M17.11 PRIMARY OSTEOARTHRITIS OF RIGHT KNEE: Primary | ICD-10-CM

## 2017-07-24 LAB
ANION GAP BLD CALC-SCNC: 8 MMOL/L (ref 3–18)
BUN SERPL-MCNC: 17 MG/DL (ref 7–18)
BUN/CREAT SERPL: 15 (ref 12–20)
CALCIUM SERPL-MCNC: 9.4 MG/DL (ref 8.5–10.1)
CHLORIDE SERPL-SCNC: 106 MMOL/L (ref 100–108)
CO2 SERPL-SCNC: 25 MMOL/L (ref 21–32)
CREAT SERPL-MCNC: 1.12 MG/DL (ref 0.6–1.3)
EST. AVERAGE GLUCOSE BLD GHB EST-MCNC: 137 MG/DL
GLUCOSE BLD STRIP.AUTO-MCNC: 117 MG/DL (ref 70–110)
GLUCOSE BLD STRIP.AUTO-MCNC: 133 MG/DL (ref 70–110)
GLUCOSE BLD STRIP.AUTO-MCNC: 142 MG/DL (ref 70–110)
GLUCOSE BLD STRIP.AUTO-MCNC: 157 MG/DL (ref 70–110)
GLUCOSE SERPL-MCNC: 163 MG/DL (ref 74–99)
HBA1C MFR BLD: 6.4 % (ref 4.2–5.6)
HCT VFR BLD AUTO: 36 % (ref 35–45)
HGB BLD-MCNC: 11.6 G/DL (ref 12–16)
POTASSIUM SERPL-SCNC: 4.5 MMOL/L (ref 3.5–5.5)
SODIUM SERPL-SCNC: 139 MMOL/L (ref 136–145)

## 2017-07-24 PROCEDURE — 73560 X-RAY EXAM OF KNEE 1 OR 2: CPT

## 2017-07-24 PROCEDURE — 74011250636 HC RX REV CODE- 250/636

## 2017-07-24 PROCEDURE — 85018 HEMOGLOBIN: CPT | Performed by: ORTHOPAEDIC SURGERY

## 2017-07-24 PROCEDURE — 77010033678 HC OXYGEN DAILY

## 2017-07-24 PROCEDURE — 74011250636 HC RX REV CODE- 250/636: Performed by: ORTHOPAEDIC SURGERY

## 2017-07-24 PROCEDURE — C1776 JOINT DEVICE (IMPLANTABLE): HCPCS | Performed by: ORTHOPAEDIC SURGERY

## 2017-07-24 PROCEDURE — 77030011640 HC PAD GRND REM COVD -A: Performed by: ORTHOPAEDIC SURGERY

## 2017-07-24 PROCEDURE — 77030010785: Performed by: ORTHOPAEDIC SURGERY

## 2017-07-24 PROCEDURE — 77030003029 HC SUT VCRL J&J -B: Performed by: ORTHOPAEDIC SURGERY

## 2017-07-24 PROCEDURE — 77030027138 HC INCENT SPIROMETER -A: Performed by: ORTHOPAEDIC SURGERY

## 2017-07-24 PROCEDURE — 77030012894: Performed by: ORTHOPAEDIC SURGERY

## 2017-07-24 PROCEDURE — 77030000032 HC CUF TRNQT ZIMM -B: Performed by: ORTHOPAEDIC SURGERY

## 2017-07-24 PROCEDURE — 77030027138 HC INCENT SPIROMETER -A

## 2017-07-24 PROCEDURE — 77030034850: Performed by: ORTHOPAEDIC SURGERY

## 2017-07-24 PROCEDURE — 77030008683 HC TU ET CUF COVD -A: Performed by: ANESTHESIOLOGY

## 2017-07-24 PROCEDURE — 74011250637 HC RX REV CODE- 250/637

## 2017-07-24 PROCEDURE — 76942 ECHO GUIDE FOR BIOPSY: CPT | Performed by: ANESTHESIOLOGY

## 2017-07-24 PROCEDURE — 77030006774 HC BLD SAW OSC BRSM -B: Performed by: ORTHOPAEDIC SURGERY

## 2017-07-24 PROCEDURE — 77030013079 HC BLNKT BAIR HGGR 3M -A: Performed by: ANESTHESIOLOGY

## 2017-07-24 PROCEDURE — 74011000250 HC RX REV CODE- 250: Performed by: ORTHOPAEDIC SURGERY

## 2017-07-24 PROCEDURE — 77030008462 HC STPLR SKN PROX J&J -A: Performed by: ORTHOPAEDIC SURGERY

## 2017-07-24 PROCEDURE — 77030020754 HC CUF TRNQT 2BLA STRY -B: Performed by: ORTHOPAEDIC SURGERY

## 2017-07-24 PROCEDURE — 77030006835 HC BLD SAW SAG STRY -B: Performed by: ORTHOPAEDIC SURGERY

## 2017-07-24 PROCEDURE — 97110 THERAPEUTIC EXERCISES: CPT

## 2017-07-24 PROCEDURE — 77030006822 HC BLD SAW SAG BRSM -B: Performed by: ORTHOPAEDIC SURGERY

## 2017-07-24 PROCEDURE — 77030018836 HC SOL IRR NACL ICUM -A: Performed by: ORTHOPAEDIC SURGERY

## 2017-07-24 PROCEDURE — 0SRC0J9 REPLACEMENT OF RIGHT KNEE JOINT WITH SYNTHETIC SUBSTITUTE, CEMENTED, OPEN APPROACH: ICD-10-PCS | Performed by: ORTHOPAEDIC SURGERY

## 2017-07-24 PROCEDURE — 77030003666 HC NDL SPINAL BD -A: Performed by: ORTHOPAEDIC SURGERY

## 2017-07-24 PROCEDURE — 76060000034 HC ANESTHESIA 1.5 TO 2 HR: Performed by: ORTHOPAEDIC SURGERY

## 2017-07-24 PROCEDURE — 74011250636 HC RX REV CODE- 250/636: Performed by: NURSE ANESTHETIST, CERTIFIED REGISTERED

## 2017-07-24 PROCEDURE — 36415 COLL VENOUS BLD VENIPUNCTURE: CPT | Performed by: ORTHOPAEDIC SURGERY

## 2017-07-24 PROCEDURE — C1713 ANCHOR/SCREW BN/BN,TIS/BN: HCPCS | Performed by: ORTHOPAEDIC SURGERY

## 2017-07-24 PROCEDURE — 74011250637 HC RX REV CODE- 250/637: Performed by: ORTHOPAEDIC SURGERY

## 2017-07-24 PROCEDURE — 77030008477 HC STYL SATN SLP COVD -A: Performed by: ANESTHESIOLOGY

## 2017-07-24 PROCEDURE — 74011250636 HC RX REV CODE- 250/636: Performed by: ANESTHESIOLOGY

## 2017-07-24 PROCEDURE — 64447 NJX AA&/STRD FEMORAL NRV IMG: CPT | Performed by: ANESTHESIOLOGY

## 2017-07-24 PROCEDURE — 83036 HEMOGLOBIN GLYCOSYLATED A1C: CPT | Performed by: NURSE PRACTITIONER

## 2017-07-24 PROCEDURE — 77030020255 HC SOL INJ LR 1000ML BG

## 2017-07-24 PROCEDURE — 77030013708 HC HNDPC SUC IRR PULS STRY –B: Performed by: ORTHOPAEDIC SURGERY

## 2017-07-24 PROCEDURE — 76210000017 HC OR PH I REC 1.5 TO 2 HR: Performed by: ORTHOPAEDIC SURGERY

## 2017-07-24 PROCEDURE — 97161 PT EVAL LOW COMPLEX 20 MIN: CPT

## 2017-07-24 PROCEDURE — 65270000029 HC RM PRIVATE

## 2017-07-24 PROCEDURE — 80048 BASIC METABOLIC PNL TOTAL CA: CPT | Performed by: ORTHOPAEDIC SURGERY

## 2017-07-24 PROCEDURE — 76010000162 HC OR TIME 1.5 TO 2 HR INTENSV-TIER 1: Performed by: ORTHOPAEDIC SURGERY

## 2017-07-24 PROCEDURE — 74011250637 HC RX REV CODE- 250/637: Performed by: NURSE PRACTITIONER

## 2017-07-24 PROCEDURE — 77030031139 HC SUT VCRL2 J&J -A: Performed by: ORTHOPAEDIC SURGERY

## 2017-07-24 PROCEDURE — 77030022668 HC TIP SUC IRR PULS STRY –B: Performed by: ORTHOPAEDIC SURGERY

## 2017-07-24 PROCEDURE — 82962 GLUCOSE BLOOD TEST: CPT

## 2017-07-24 PROCEDURE — 77030018846 HC SOL IRR STRL H20 ICUM -A: Performed by: ORTHOPAEDIC SURGERY

## 2017-07-24 PROCEDURE — 74011000272 HC RX REV CODE- 272: Performed by: ORTHOPAEDIC SURGERY

## 2017-07-24 PROCEDURE — 77030020788: Performed by: ORTHOPAEDIC SURGERY

## 2017-07-24 PROCEDURE — 74011000250 HC RX REV CODE- 250

## 2017-07-24 PROCEDURE — 74011000250 HC RX REV CODE- 250: Performed by: NURSE ANESTHETIST, CERTIFIED REGISTERED

## 2017-07-24 PROCEDURE — 97530 THERAPEUTIC ACTIVITIES: CPT

## 2017-07-24 DEVICE — COMPONENT KNEE CEM X3 TRIATHLON: Type: IMPLANTABLE DEVICE | Site: KNEE | Status: FUNCTIONAL

## 2017-07-24 DEVICE — BASEPLT TIB UNIV TRIATHLN 3 --: Type: IMPLANTABLE DEVICE | Site: KNEE | Status: FUNCTIONAL

## 2017-07-24 DEVICE — CEMENT BNE 20ML 41GM FULL DOSE PMMA W/ TOBRA M VISC RADPQ: Type: IMPLANTABLE DEVICE | Site: KNEE | Status: FUNCTIONAL

## 2017-07-24 DEVICE — INSERT TIB SZ 3 THK9MM KNEE X3 TOT STBL + TRIATHLON: Type: IMPLANTABLE DEVICE | Site: KNEE | Status: FUNCTIONAL

## 2017-07-24 DEVICE — COMPONENT PAT DIA33MM THK9MM KNEE X3 SYMMETRIC TRIATHLON: Type: IMPLANTABLE DEVICE | Site: KNEE | Status: FUNCTIONAL

## 2017-07-24 DEVICE — COMPONENT FEM SZ 2 R KNEE POST STBL CEM TRIATHLON: Type: IMPLANTABLE DEVICE | Site: KNEE | Status: FUNCTIONAL

## 2017-07-24 RX ORDER — SIMVASTATIN 20 MG/1
20 TABLET, FILM COATED ORAL
Status: DISCONTINUED | OUTPATIENT
Start: 2017-07-24 | End: 2017-07-26 | Stop reason: HOSPADM

## 2017-07-24 RX ORDER — MORPHINE SULFATE 2 MG/ML
2 INJECTION, SOLUTION INTRAMUSCULAR; INTRAVENOUS
Status: DISCONTINUED | OUTPATIENT
Start: 2017-07-24 | End: 2017-07-26 | Stop reason: HOSPADM

## 2017-07-24 RX ORDER — NITROGLYCERIN 0.4 MG/1
0.4 TABLET SUBLINGUAL
Status: DISCONTINUED | OUTPATIENT
Start: 2017-07-24 | End: 2017-07-26 | Stop reason: HOSPADM

## 2017-07-24 RX ORDER — ALBUTEROL SULFATE 0.83 MG/ML
2.5 SOLUTION RESPIRATORY (INHALATION)
Status: DISCONTINUED | OUTPATIENT
Start: 2017-07-24 | End: 2017-07-26 | Stop reason: HOSPADM

## 2017-07-24 RX ORDER — ONDANSETRON 2 MG/ML
4 INJECTION INTRAMUSCULAR; INTRAVENOUS
Status: DISCONTINUED | OUTPATIENT
Start: 2017-07-24 | End: 2017-07-26 | Stop reason: HOSPADM

## 2017-07-24 RX ORDER — FENTANYL CITRATE 50 UG/ML
50 INJECTION, SOLUTION INTRAMUSCULAR; INTRAVENOUS
Status: DISCONTINUED | OUTPATIENT
Start: 2017-07-24 | End: 2017-07-24 | Stop reason: HOSPADM

## 2017-07-24 RX ORDER — HYDROMORPHONE HYDROCHLORIDE 2 MG/ML
0.5 INJECTION, SOLUTION INTRAMUSCULAR; INTRAVENOUS; SUBCUTANEOUS
Status: DISCONTINUED | OUTPATIENT
Start: 2017-07-24 | End: 2017-07-24 | Stop reason: HOSPADM

## 2017-07-24 RX ORDER — MAGNESIUM SULFATE 100 %
4 CRYSTALS MISCELLANEOUS AS NEEDED
Status: DISCONTINUED | OUTPATIENT
Start: 2017-07-24 | End: 2017-07-26 | Stop reason: HOSPADM

## 2017-07-24 RX ORDER — DEXTROSE 50 % IN WATER (D50W) INTRAVENOUS SYRINGE
25-50 AS NEEDED
Status: DISCONTINUED | OUTPATIENT
Start: 2017-07-24 | End: 2017-07-26 | Stop reason: HOSPADM

## 2017-07-24 RX ORDER — FAMOTIDINE 20 MG/1
20 TABLET, FILM COATED ORAL ONCE
Status: DISCONTINUED | OUTPATIENT
Start: 2017-07-25 | End: 2017-07-24 | Stop reason: HOSPADM

## 2017-07-24 RX ORDER — SODIUM CHLORIDE, SODIUM LACTATE, POTASSIUM CHLORIDE, CALCIUM CHLORIDE 600; 310; 30; 20 MG/100ML; MG/100ML; MG/100ML; MG/100ML
75 INJECTION, SOLUTION INTRAVENOUS CONTINUOUS
Status: DISPENSED | OUTPATIENT
Start: 2017-07-24 | End: 2017-07-25

## 2017-07-24 RX ORDER — CEFAZOLIN SODIUM 2 G/50ML
2 SOLUTION INTRAVENOUS EVERY 8 HOURS
Status: COMPLETED | OUTPATIENT
Start: 2017-07-24 | End: 2017-07-24

## 2017-07-24 RX ORDER — ENOXAPARIN SODIUM 100 MG/ML
40 INJECTION SUBCUTANEOUS EVERY 24 HOURS
Status: DISCONTINUED | OUTPATIENT
Start: 2017-07-24 | End: 2017-07-26 | Stop reason: HOSPADM

## 2017-07-24 RX ORDER — SODIUM CHLORIDE 0.9 % (FLUSH) 0.9 %
5-10 SYRINGE (ML) INJECTION EVERY 8 HOURS
Status: DISCONTINUED | OUTPATIENT
Start: 2017-07-24 | End: 2017-07-26 | Stop reason: HOSPADM

## 2017-07-24 RX ORDER — LIDOCAINE HYDROCHLORIDE 20 MG/ML
INJECTION, SOLUTION EPIDURAL; INFILTRATION; INTRACAUDAL; PERINEURAL AS NEEDED
Status: DISCONTINUED | OUTPATIENT
Start: 2017-07-24 | End: 2017-07-24 | Stop reason: HOSPADM

## 2017-07-24 RX ORDER — ALBUTEROL SULFATE 0.83 MG/ML
2.5 SOLUTION RESPIRATORY (INHALATION)
Status: COMPLETED | OUTPATIENT
Start: 2017-07-24 | End: 2017-07-24

## 2017-07-24 RX ORDER — MIDAZOLAM HYDROCHLORIDE 1 MG/ML
INJECTION, SOLUTION INTRAMUSCULAR; INTRAVENOUS AS NEEDED
Status: DISCONTINUED | OUTPATIENT
Start: 2017-07-24 | End: 2017-07-24 | Stop reason: HOSPADM

## 2017-07-24 RX ORDER — MAGNESIUM SULFATE 100 %
4 CRYSTALS MISCELLANEOUS AS NEEDED
Status: DISCONTINUED | OUTPATIENT
Start: 2017-07-24 | End: 2017-07-24 | Stop reason: HOSPADM

## 2017-07-24 RX ORDER — CYCLOBENZAPRINE HCL 10 MG
10 TABLET ORAL
Status: DISCONTINUED | OUTPATIENT
Start: 2017-07-24 | End: 2017-07-25

## 2017-07-24 RX ORDER — ALBUTEROL SULFATE 90 UG/1
2 AEROSOL, METERED RESPIRATORY (INHALATION)
Status: DISCONTINUED | OUTPATIENT
Start: 2017-07-24 | End: 2017-07-24 | Stop reason: CLARIF

## 2017-07-24 RX ORDER — THEOPHYLLINE 300 MG/1
300 TABLET, EXTENDED RELEASE ORAL 2 TIMES DAILY
Status: DISCONTINUED | OUTPATIENT
Start: 2017-07-24 | End: 2017-07-26 | Stop reason: HOSPADM

## 2017-07-24 RX ORDER — BUPIVACAINE HYDROCHLORIDE AND EPINEPHRINE 2.5; 5 MG/ML; UG/ML
INJECTION, SOLUTION EPIDURAL; INFILTRATION; INTRACAUDAL; PERINEURAL AS NEEDED
Status: DISCONTINUED | OUTPATIENT
Start: 2017-07-24 | End: 2017-07-24 | Stop reason: HOSPADM

## 2017-07-24 RX ORDER — INSULIN LISPRO 100 [IU]/ML
INJECTION, SOLUTION INTRAVENOUS; SUBCUTANEOUS ONCE
Status: DISCONTINUED | OUTPATIENT
Start: 2017-07-24 | End: 2017-07-24 | Stop reason: HOSPADM

## 2017-07-24 RX ORDER — MIDAZOLAM HYDROCHLORIDE 1 MG/ML
2 INJECTION, SOLUTION INTRAMUSCULAR; INTRAVENOUS ONCE
Status: COMPLETED | OUTPATIENT
Start: 2017-07-24 | End: 2017-07-24

## 2017-07-24 RX ORDER — FLUOXETINE HYDROCHLORIDE 20 MG/1
40 CAPSULE ORAL DAILY
Status: DISCONTINUED | OUTPATIENT
Start: 2017-07-24 | End: 2017-07-26 | Stop reason: HOSPADM

## 2017-07-24 RX ORDER — DIPHENHYDRAMINE HCL 25 MG
25 CAPSULE ORAL
Status: DISCONTINUED | OUTPATIENT
Start: 2017-07-24 | End: 2017-07-26 | Stop reason: HOSPADM

## 2017-07-24 RX ORDER — CELECOXIB 100 MG/1
200 CAPSULE ORAL 2 TIMES DAILY
Status: DISCONTINUED | OUTPATIENT
Start: 2017-07-24 | End: 2017-07-25

## 2017-07-24 RX ORDER — DOCUSATE SODIUM 100 MG/1
100 CAPSULE, LIQUID FILLED ORAL 2 TIMES DAILY
Status: DISCONTINUED | OUTPATIENT
Start: 2017-07-24 | End: 2017-07-26 | Stop reason: HOSPADM

## 2017-07-24 RX ORDER — ONDANSETRON 2 MG/ML
INJECTION INTRAMUSCULAR; INTRAVENOUS AS NEEDED
Status: DISCONTINUED | OUTPATIENT
Start: 2017-07-24 | End: 2017-07-24 | Stop reason: HOSPADM

## 2017-07-24 RX ORDER — ARFORMOTEROL TARTRATE 15 UG/2ML
15 SOLUTION RESPIRATORY (INHALATION)
Status: DISCONTINUED | OUTPATIENT
Start: 2017-07-24 | End: 2017-07-26 | Stop reason: HOSPADM

## 2017-07-24 RX ORDER — OXYCODONE AND ACETAMINOPHEN 7.5; 325 MG/1; MG/1
1 TABLET ORAL
Status: DISCONTINUED | OUTPATIENT
Start: 2017-07-24 | End: 2017-07-25

## 2017-07-24 RX ORDER — BUDESONIDE 0.5 MG/2ML
500 INHALANT ORAL
Status: DISCONTINUED | OUTPATIENT
Start: 2017-07-24 | End: 2017-07-26 | Stop reason: HOSPADM

## 2017-07-24 RX ORDER — SODIUM CHLORIDE, SODIUM LACTATE, POTASSIUM CHLORIDE, CALCIUM CHLORIDE 600; 310; 30; 20 MG/100ML; MG/100ML; MG/100ML; MG/100ML
125 INJECTION, SOLUTION INTRAVENOUS CONTINUOUS
Status: DISCONTINUED | OUTPATIENT
Start: 2017-07-24 | End: 2017-07-24 | Stop reason: HOSPADM

## 2017-07-24 RX ORDER — HYDROMORPHONE HYDROCHLORIDE 1 MG/ML
INJECTION, SOLUTION INTRAMUSCULAR; INTRAVENOUS; SUBCUTANEOUS AS NEEDED
Status: DISCONTINUED | OUTPATIENT
Start: 2017-07-24 | End: 2017-07-24 | Stop reason: HOSPADM

## 2017-07-24 RX ORDER — FAMOTIDINE 20 MG/1
20 TABLET, FILM COATED ORAL 2 TIMES DAILY
Status: DISCONTINUED | OUTPATIENT
Start: 2017-07-24 | End: 2017-07-26 | Stop reason: HOSPADM

## 2017-07-24 RX ORDER — INSULIN LISPRO 100 [IU]/ML
INJECTION, SOLUTION INTRAVENOUS; SUBCUTANEOUS
Status: DISCONTINUED | OUTPATIENT
Start: 2017-07-24 | End: 2017-07-26 | Stop reason: HOSPADM

## 2017-07-24 RX ORDER — ALBUTEROL SULFATE 0.83 MG/ML
SOLUTION RESPIRATORY (INHALATION)
Status: DISPENSED
Start: 2017-07-24 | End: 2017-07-24

## 2017-07-24 RX ORDER — SUCCINYLCHOLINE CHLORIDE 20 MG/ML
INJECTION INTRAMUSCULAR; INTRAVENOUS AS NEEDED
Status: DISCONTINUED | OUTPATIENT
Start: 2017-07-24 | End: 2017-07-24 | Stop reason: HOSPADM

## 2017-07-24 RX ORDER — ALLOPURINOL 300 MG/1
300 TABLET ORAL DAILY
Status: DISCONTINUED | OUTPATIENT
Start: 2017-07-24 | End: 2017-07-26 | Stop reason: HOSPADM

## 2017-07-24 RX ORDER — METFORMIN HYDROCHLORIDE 500 MG/1
1000 TABLET ORAL 2 TIMES DAILY WITH MEALS
Status: DISCONTINUED | OUTPATIENT
Start: 2017-07-24 | End: 2017-07-24

## 2017-07-24 RX ORDER — PROPOFOL 10 MG/ML
INJECTION, EMULSION INTRAVENOUS AS NEEDED
Status: DISCONTINUED | OUTPATIENT
Start: 2017-07-24 | End: 2017-07-24 | Stop reason: HOSPADM

## 2017-07-24 RX ORDER — SODIUM CHLORIDE 0.9 % (FLUSH) 0.9 %
5-10 SYRINGE (ML) INJECTION AS NEEDED
Status: DISCONTINUED | OUTPATIENT
Start: 2017-07-24 | End: 2017-07-26 | Stop reason: HOSPADM

## 2017-07-24 RX ORDER — SODIUM CHLORIDE, SODIUM LACTATE, POTASSIUM CHLORIDE, CALCIUM CHLORIDE 600; 310; 30; 20 MG/100ML; MG/100ML; MG/100ML; MG/100ML
50 INJECTION, SOLUTION INTRAVENOUS CONTINUOUS
Status: DISCONTINUED | OUTPATIENT
Start: 2017-07-25 | End: 2017-07-24 | Stop reason: HOSPADM

## 2017-07-24 RX ORDER — QUETIAPINE FUMARATE 25 MG/1
50 TABLET, FILM COATED ORAL
Status: DISCONTINUED | OUTPATIENT
Start: 2017-07-24 | End: 2017-07-26 | Stop reason: HOSPADM

## 2017-07-24 RX ORDER — SODIUM CHLORIDE 0.9 % (FLUSH) 0.9 %
5-10 SYRINGE (ML) INJECTION EVERY 8 HOURS
Status: DISCONTINUED | OUTPATIENT
Start: 2017-07-24 | End: 2017-07-24 | Stop reason: HOSPADM

## 2017-07-24 RX ORDER — FENTANYL CITRATE 50 UG/ML
INJECTION, SOLUTION INTRAMUSCULAR; INTRAVENOUS AS NEEDED
Status: DISCONTINUED | OUTPATIENT
Start: 2017-07-24 | End: 2017-07-24 | Stop reason: HOSPADM

## 2017-07-24 RX ORDER — FAMOTIDINE 20 MG/1
TABLET, FILM COATED ORAL
Status: COMPLETED
Start: 2017-07-24 | End: 2017-07-24

## 2017-07-24 RX ORDER — DIPHENHYDRAMINE HYDROCHLORIDE 50 MG/ML
12.5 INJECTION, SOLUTION INTRAMUSCULAR; INTRAVENOUS
Status: DISCONTINUED | OUTPATIENT
Start: 2017-07-24 | End: 2017-07-26 | Stop reason: HOSPADM

## 2017-07-24 RX ORDER — SODIUM CHLORIDE 0.9 % (FLUSH) 0.9 %
5-10 SYRINGE (ML) INJECTION AS NEEDED
Status: DISCONTINUED | OUTPATIENT
Start: 2017-07-24 | End: 2017-07-24 | Stop reason: HOSPADM

## 2017-07-24 RX ORDER — NALOXONE HYDROCHLORIDE 0.4 MG/ML
0.4 INJECTION, SOLUTION INTRAMUSCULAR; INTRAVENOUS; SUBCUTANEOUS AS NEEDED
Status: DISCONTINUED | OUTPATIENT
Start: 2017-07-24 | End: 2017-07-26 | Stop reason: HOSPADM

## 2017-07-24 RX ORDER — ALBUTEROL SULFATE 90 UG/1
AEROSOL, METERED RESPIRATORY (INHALATION) AS NEEDED
Status: DISCONTINUED | OUTPATIENT
Start: 2017-07-24 | End: 2017-07-24 | Stop reason: HOSPADM

## 2017-07-24 RX ORDER — AMLODIPINE BESYLATE 5 MG/1
5 TABLET ORAL DAILY
Status: DISCONTINUED | OUTPATIENT
Start: 2017-07-24 | End: 2017-07-26 | Stop reason: HOSPADM

## 2017-07-24 RX ORDER — DEXTROSE 50 % IN WATER (D50W) INTRAVENOUS SYRINGE
25-50 AS NEEDED
Status: DISCONTINUED | OUTPATIENT
Start: 2017-07-24 | End: 2017-07-24 | Stop reason: HOSPADM

## 2017-07-24 RX ADMIN — SIMVASTATIN 20 MG: 20 TABLET, FILM COATED ORAL at 22:23

## 2017-07-24 RX ADMIN — MORPHINE SULFATE 2 MG: 2 INJECTION, SOLUTION INTRAMUSCULAR; INTRAVENOUS at 13:09

## 2017-07-24 RX ADMIN — MULTIPLE VITAMINS W/ MINERALS TAB 1 TABLET: TAB at 15:30

## 2017-07-24 RX ADMIN — LIDOCAINE HYDROCHLORIDE 50 MG: 20 INJECTION, SOLUTION EPIDURAL; INFILTRATION; INTRACAUDAL; PERINEURAL at 07:43

## 2017-07-24 RX ADMIN — OXYCODONE HYDROCHLORIDE AND ACETAMINOPHEN 1 TABLET: 7.5; 325 TABLET ORAL at 20:34

## 2017-07-24 RX ADMIN — SODIUM CHLORIDE, SODIUM LACTATE, POTASSIUM CHLORIDE, AND CALCIUM CHLORIDE 75 ML/HR: 600; 310; 30; 20 INJECTION, SOLUTION INTRAVENOUS at 13:09

## 2017-07-24 RX ADMIN — DOCUSATE SODIUM 100 MG: 100 CAPSULE, LIQUID FILLED ORAL at 17:33

## 2017-07-24 RX ADMIN — CELECOXIB 200 MG: 100 CAPSULE ORAL at 18:36

## 2017-07-24 RX ADMIN — CEFAZOLIN 3 G: 1 INJECTION, POWDER, FOR SOLUTION INTRAMUSCULAR; INTRAVENOUS; PARENTERAL at 07:39

## 2017-07-24 RX ADMIN — FENTANYL CITRATE 100 MCG: 50 INJECTION, SOLUTION INTRAMUSCULAR; INTRAVENOUS at 07:43

## 2017-07-24 RX ADMIN — MORPHINE SULFATE 2 MG: 2 INJECTION, SOLUTION INTRAMUSCULAR; INTRAVENOUS at 22:21

## 2017-07-24 RX ADMIN — MORPHINE SULFATE 2 MG: 2 INJECTION, SOLUTION INTRAMUSCULAR; INTRAVENOUS at 18:36

## 2017-07-24 RX ADMIN — ALBUTEROL SULFATE 4 PUFF: 90 AEROSOL, METERED RESPIRATORY (INHALATION) at 07:53

## 2017-07-24 RX ADMIN — FENTANYL CITRATE 50 MCG: 50 INJECTION, SOLUTION INTRAMUSCULAR; INTRAVENOUS at 07:26

## 2017-07-24 RX ADMIN — ENOXAPARIN SODIUM 40 MG: 40 INJECTION SUBCUTANEOUS at 15:30

## 2017-07-24 RX ADMIN — ONDANSETRON 4 MG: 2 INJECTION INTRAMUSCULAR; INTRAVENOUS at 09:16

## 2017-07-24 RX ADMIN — SODIUM CHLORIDE, SODIUM LACTATE, POTASSIUM CHLORIDE, AND CALCIUM CHLORIDE 50 ML/HR: 600; 310; 30; 20 INJECTION, SOLUTION INTRAVENOUS at 07:12

## 2017-07-24 RX ADMIN — MIDAZOLAM HYDROCHLORIDE 2 MG: 1 INJECTION, SOLUTION INTRAMUSCULAR; INTRAVENOUS at 07:26

## 2017-07-24 RX ADMIN — AMLODIPINE BESYLATE 5 MG: 5 TABLET ORAL at 15:30

## 2017-07-24 RX ADMIN — Medication 10 ML: at 22:21

## 2017-07-24 RX ADMIN — DIPHENHYDRAMINE HYDROCHLORIDE 12.5 MG: 50 INJECTION, SOLUTION INTRAMUSCULAR; INTRAVENOUS at 14:56

## 2017-07-24 RX ADMIN — MIDAZOLAM HYDROCHLORIDE 2 MG: 1 INJECTION, SOLUTION INTRAMUSCULAR; INTRAVENOUS at 07:39

## 2017-07-24 RX ADMIN — CYCLOBENZAPRINE HYDROCHLORIDE 10 MG: 10 TABLET, FILM COATED ORAL at 17:29

## 2017-07-24 RX ADMIN — OXYCODONE HYDROCHLORIDE AND ACETAMINOPHEN 1 TABLET: 7.5; 325 TABLET ORAL at 16:24

## 2017-07-24 RX ADMIN — ALBUTEROL SULFATE 2.5 MG: 2.5 SOLUTION RESPIRATORY (INHALATION) at 09:41

## 2017-07-24 RX ADMIN — CEFAZOLIN SODIUM 2 G: 2 SOLUTION INTRAVENOUS at 23:13

## 2017-07-24 RX ADMIN — OXYCODONE HYDROCHLORIDE AND ACETAMINOPHEN 1 TABLET: 7.5; 325 TABLET ORAL at 11:35

## 2017-07-24 RX ADMIN — HYDROMORPHONE HYDROCHLORIDE 1 MG: 1 INJECTION, SOLUTION INTRAMUSCULAR; INTRAVENOUS; SUBCUTANEOUS at 08:17

## 2017-07-24 RX ADMIN — ALLOPURINOL 300 MG: 300 TABLET ORAL at 15:30

## 2017-07-24 RX ADMIN — QUETIAPINE FUMARATE 50 MG: 25 TABLET, FILM COATED ORAL at 22:24

## 2017-07-24 RX ADMIN — SUCCINYLCHOLINE CHLORIDE 100 MG: 20 INJECTION INTRAMUSCULAR; INTRAVENOUS at 07:43

## 2017-07-24 RX ADMIN — CEFAZOLIN SODIUM 2 G: 2 SOLUTION INTRAVENOUS at 14:55

## 2017-07-24 RX ADMIN — PROPOFOL 150 MG: 10 INJECTION, EMULSION INTRAVENOUS at 07:43

## 2017-07-24 RX ADMIN — FAMOTIDINE 20 MG: 20 TABLET ORAL at 17:32

## 2017-07-24 RX ADMIN — FAMOTIDINE 20 MG: 20 TABLET, FILM COATED ORAL at 06:55

## 2017-07-24 RX ADMIN — FLUOXETINE 40 MG: 20 CAPSULE ORAL at 15:30

## 2017-07-24 NOTE — PROGRESS NOTES
conducted a pre-surgery visit with Rashel Parra, who is a 58 y.o.,female. The  provided the following Interventions:  Initiated a relationship of care and support. Offered prayer and assurance of continued prayers on patient's behalf. Plan:  Chaplains will continue to follow and will provide pastoral care on an as needed/requested basis.  recommends bedside caregivers page  on duty if patient shows signs of acute spiritual or emotional distress.

## 2017-07-24 NOTE — OP NOTES
OPERATIVE REPORT      NAME: Niya Marrufo RECORD NUMBER: [id number]    YOB: 1955    AGE: 58 y.o. DATE OF SURGERY: 7/24/2017    Preoperative Diagnosis: osteoarthritis m17.11     Postoperative Diagnosis: osteoarthritis     OPERATIVE PROCEDURE:  Procedure(s):  right total knee replacement    SURGEON: Mk Sanchez MD    ASSISTANT:  Surgeon(s) and Role:     * Mk Sanchez MD - Primary    ANESTHESIA: General, regional block and local posterior capsule injection      IMPLANTS:   Implant Name Type Inv. Item Serial No.  Lot No. LRB No. Used Action   CEMENT BNE SIMPLEX TOBRA 4 --  - [id number]  CEMENT BNE SIMPLEX TOBRA 4 --   [employer] HOW [id number] Right [id number] Implanted   COMPNT FEM PS MARCELLO TRIATHLN 2 R --  - [id number]  COMPNT FEM PS MARCELLO TRIATHLN 2 R --   [employer] HOW [id number] Right 1 Implanted   BASEPLT TIB UNIV TRIATHLN 3 --  - [id number]  BASEPLT TIB UNIV TRIATHLN 3 --   [employer] HOW [id number] Right 1 Implanted   PAT SYM TRIATHLN X3 33X9MM -- TRIATHLON SYMMETRIC X3 - [id number]  PAT SYM TRIATHLN X3 33X9MM -- TRIATHLON SYMMETRIC X3  [employer] HOW [id number] Right 1 Implanted   INSERT TIB X3 PLOY SZ 3 9MM -- TRIATHION TS PLUS - [id number]   INSERT TIB X3 PLOY SZ 3 9MM -- TRIATHION TS PLUS   [employer] HOW [id number] Right 1 Implanted       COMPLICATIONS: None    ESTIMATED BLOOD LOSS: * No values recorded between 7/24/2017  8:14 AM and 7/24/2017  9:27 AM *    TOURNIQUET TIME[de-identified] darlene 90 min    HISTORY:  This patient is 58 y.o. and she carries a history of degenerative joint disease of the right knee. She has been managed conservatively, and has elected to proceed with right total knee replacement in order to decrease pain and restore function. She was found to be stable for the planned procedure. PROCEDURE:  After anesthesia was obtained the patient was placed supine on the operating room table.   A tourniquet was applied to the thigh and the  leg was prepared and draped sterilely. A standard time-out and pause occurred verifyng the patient and surgical site with the operative team.    A midline incision was then performed. A medial parapetellar incision was then carried out and the joint was entered. The quadriceps was retracted and the synovium excised. The knee was then flexed and the patella everted. Remaining meniscus was removed and the ACL divided. Osteophytes were removed. A large drill bit was used to create a hole into the intramedullary canal of the distal femur and the intramedullary canal was opened. The intramedullary jared was placed with a distal femoral cutting block secured at 6 degrees and 8 mm resection. The cut was made and the resection guide and pins removed. Attention was then directed to the tibia. The external alignment device was placed centered on the ankle at 0 degrees and secured measuring 9 mm off the prominent condyle taking the PCL as planned. The tibial size was estimated from the condyle removed and sized on the tibia. The femur was then sized anterior to posterior marked at 3 degrees of ER and verified medial to lateral.  The cutting guide was then secured with pins. Anterior, posterior, and chamfer cuts were then carried out without difficulty. The notch resection guide was then placed and secured and medial and lateral cuts were made followed by the roof cut. The block of bone and remnants of the PCL removed. I then placed the trial femur and verified the tibial size with the sized insert in place. Soft tissues were assessed and balanced from 0-115 degrees of flexion. The knee was stable in flexion and extension. The patella was everted and thickness measured. The patella was measured and sized and the peg holes drilled. The height was restored with the the appropriate trial.  ROM and stability and patella tracking were re-assessed and verified as acceptable.   Trials were removed and the appropriate sized tibial base plate was secured with pins verifying appropriate rotation. The tibia was then prepared with the tower and sequential keel punches. The trial baseplate and pins were removed. The knee was pulse lavaged of all debris and any remaining  osteophytes and loose bodies removed. The implants for implantation were verified and prepared on the field. The posterior capsule was injected with 0.25% marcaine and 2 bags of cement were mixed on the back table. The tibial implant was placed and impacted and excess cement removed. The femoral component was impacted with cement and the excess removed. The knee was reduced with the trial insert and the knee brought to full extension. The patella was cemented and secured with a clamp and excess cement removed. Once the cement hardened ROM and stability were verified. The appropriate PS insert was placed and verified as locked in position. Final ROM, stability and patella tracking were confirmed as satisfactory. The joint was pulse lavaged and closed in a layered fashion. Standard dressing was applied and the tourniquet deflated. The patient was transferred to the recovery room in a stable condition without complications. Sponge, needle, instrument counts were correct prior to closure, and there were no complications.     Johnna Webster MD  July 24, 2017

## 2017-07-24 NOTE — PROGRESS NOTES
Gab[facility]   Discharge Planning/ Assessment    Reasons for Intervention: Interviewed patient, she agrees to share her discharge information with her friend, see below. She was independent prior to admission and see NP Shaina for her primary care needs. Her discharge plan is to return home with home care as indicated. Patient is requesiting personal care screening. Completed UAI # [id number]. She has a wheelchair, walker, cane, BSC.       High Risk Criteria  [] Yes  [x]No   Physician Referral  [] Yes  [x]No        Date    Nursing Referral  [] Yes  [x]No        Date    Patient/Family Request  [] Yes  [x]No        Date       Resources:    Medicare  [] Yes  [x]No   Medicaid  [x] Yes  []No   No Resources  [] Yes  [x]No   Private Insurance  [] Yes  [x]No    Name/Phone Number    Other  [] Yes  [x]No        (i.e. Workman's Comp)         Prior Services:    Prior Services  [] Yes  [x]No   Home Health  [] Yes  [x]No   [address] [place]  [] Yes  [x]No        [facility] of [address] Program  [] Yes  [x]No       Meals on Wheels  [] Yes  [x]No   Office on Aging  [] Yes  [x]No   Transportation Services  [] Yes  [x]No   Nursing Home  [] Yes  [x]No        Nursing Home Name    [address]  [] Yes  [x]No        [address] Name    Other       Information Source:      Information obtained from  [x] Patient  [] Parent   [] [address]  [] Child  [] Spouse   [] Significant Other/Partner   [] Friend      [] EMS    [] Nursing Home Chart          [x] Other:chart   Chart Review  [] Yes  []No     Family/Support System:    Patient lives with  [x] Alone    [] Spouse   [] Significant Other  [] Children  [] Caretaker   [] Parent  [] Sibling     [] Other       Other Support System:    Is the patient responsible for care of others  [] Yes  [x]No   Information of person caring for patient on  discharge self   Managers financial affairs independently  [x] Yes  []No   If no, explain:      Status Prior to Admission:    Mental Status  [x] Awake  [x] Alert  [x] Oriented  [] Quiet/Calm [] Lethargic/Sedated   [] Disoriented  [] Restless/Anxious  [] Combative   Personal Care  [] Dependent  [] Independent Personal Care  [x] Requires Assistance   Meal Preparation Ability  [] Independent   [] Standby Assistance   [] Minimal Assistance   [x] Moderate Assistance  [] Maximum Assistance     [] Total Assistance   Chores  [] Independent with Chores   [] N/A Nursing Home Resident   [x] Requires Assistance   Bowel/Bladder  [x] Continent  [] Catheter  [] Incontinent  [] Ostomy Self-Care    [] Urine Diversion Self-Care  [] Maximum Assistance     [] Total Assistance   Number of Persons needed for assistance    DME at home  [] Serenabill Felix, Dixon  [x] Serena Elvira, Straight   [x] Commode    [] Bathroom/Grab Bars  [] Hospital Bed  [] Nebulizer  [] Oxygen           [] Raised Toilet Seat  [] Shower Chair  [] Side Rails for Bed   [] Tub Transfer Bench   [x] Colonel Jest, Rolling  [] Colonel Jest, Standard      [x] Other: wheelchair   Vendor      Treatment Presently Receiving:    Current Treatments  [] Chemotherapy  [] Dialysis  [] Insulin  [] IVAB [x] IVF   [] O2  [] PCA   [x] PT   [] RT   [] Tube Feedings   [] Wound Care     Psychosocial Evaluation:    Verbalized Knowledge of Disease Process  [x] Patient  [x]Family   Coping with Disease Process  [x] Patient  [x]Family   Requires Further Counseling Coping with Disease Process  [] Patient  []Family     Identified Projected Needs:    Home Health Aid  [] Yes  [x]No   Transportation  [x] Yes  []No   Education  [] Yes  [x]No        Specific Education     Financial Counseling  [] Yes  [x]No   Inability to Care for Self/Will Require 24 hour care  [] Yes  [x]No   Pain Management  [] Yes  [x]No   Home Infusion Therapy  [] Yes  [x]No   Oxygen Therapy  [] Yes  [x]No   DME  [] Yes  [x]No   Long Term Care Placement  [] Yes  [x]No   Rehab  [] Yes  [x]No   Physical Therapy  [] Yes  [x]No   Needs Anticipated At This Time  [x] Yes  []No     Intra-Hospital Referral:    [address]  [x] Yes  []No     [] Yes  [x]No   Patient Representative  [] Yes  [x]No   Staff for Teaching Needs  [] Yes  [x]No   Specialty Teaching Needs     Diabetic Educator  [] Yes  [x]No   Referral for Diabetic Educator Needed  [] Yes  [x]No  If Yes, place order for Nutritionist or Diabetic Consult     Tentative Discharge Plan:    Home with No Services  [] Yes  [x]No   Home with Home Health Follow-up  [x] Yes  []No        If Yes, specify type Nursing/therapy   [address] [place] [address]  [] Yes  [x]No        If Yes, specify type    Meals on Wheels  [] Yes  [x]No   Office of Aging  [] Yes  [x]No   NHP  [] Yes  [x]No   Return to the Nursing Home  [] Yes  [x]No   Rehab Therapy  [] Yes  [x]No   Acute Rehab  [] Yes  [x]No   Subacute Rehab  [] Yes  [x]No   Private Care  [] Yes  [x]No   Substance Abuse Referral  [] Yes  [x]No   Transportation  [x] Yes  []No   Chore Service  [] Yes  [x]No   Inpatient Hospice  [] Yes  [x]No   OP RT  [] Yes  [x] No   OP Hemo  [] Yes  [x] No   OP PT  [] Yes  [x]No   Support Group  [] Yes  [x]No   Reach to Recovery  [] Yes  [x]No   OP Oncology Clinic  [] Yes  [x]No   Clinic Appointment  [] Yes  [x]No   DME  [] Yes  [x]No   Comments    Name of D/C Planner or  Given to Patient or Family Oralia Valadez RN   Phone Number [phone]   Date July 24, 2017   Time 828 am   If you are discharged home, whom do you designate to participate in your discharge plan and receive any information needed?      Enter name of [address] [place] [address]        Phone # of [employer]         Address of vidhi         Updated July 24, 2017        Patient refused to designate any           individual

## 2017-07-24 NOTE — BRIEF OP NOTE
BRIEF OPERATIVE NOTE    Date of Procedure: 7/24/2017   Preoperative Diagnosis: osteoarthritis m17.11  Postoperative Diagnosis: osteoarthritis    Procedure(s):  right total knee replacement  Surgeon(s) and Role:     * Emil Arcos MD - Primary         Assistant Staff:       Surgical Staff:  Circ-1: Emmett Merino RN  Circ-2: Deyanira Holliday  Scrub Tech-1: Cleatis Bearded  Scrub Tech-2: Aviva Coello  Surg Asst-1: Albino Morfin  Event Time In   Incision Start 9300   Incision Close      Anesthesia: General, adductor canal nerve block and post capsule injection  Estimated Blood Loss: 25cc  Specimens: * No specimens in log *   Findings: full thickness cartilage erosions and osteophytes   Complications: none noted  Implants:   Implant Name Type Inv.  Item Serial No.  Lot No. LRB No. Used Action   CEMENT BNE SIMPLEX TOBRA 4 --  - [id number]  CEMENT BNE SIMPLEX TOBRA 4 --   [employer] HOW [id number] Right 2 Implanted   COMPNT FEM PS MARCELLO TRIATHLN 2 R --  - [id number]  COMPNT FEM PS MARCELLO TRIATHLN 2 R --   [employer] HOW [id number] Right 1 Implanted     TTT darlene 90 min

## 2017-07-24 NOTE — CONSULTS
[facility]  Hospitalist Division    Consult Note    Patient: Loreto Amin MRN: [id number]  CSN: [id number]    YOB: 1955  Age: 58 y.o. Sex: female    DOA: 7/24/2017 LOS:  LOS: 0 days        Requesting Physician:  Dr. Jazzy Fontanez  Reason for Consultation:  Medical Management    Chief Complaint:  Right knee pain     Assessment/Plan     Patient Active Problem List   Diagnosis Code    Primary osteoarthritis of left hip M16.12    Bronchial asthma J45.909    Type 2 diabetes mellitus without complication ([facility] [address] M39.3    Essential hypertension I10    Depression F32.9    Dyslipidemia E78.5    Decreased calculated glomerular filtration rate (GFR) R94.4    Status post total replacement of left hip Z96.642    Aftercare following left hip joint replacement surgery Z47.1, M29.843    Acute blood loss as cause of postoperative anemia D62    Gout M10.9    Allergic rhinitis J30.9    Cataract of left eye H26.9    Incisional hernia E89.3    Lichen simplex chronicus L28.0    Paronychia L03.019    Chronic alcoholism ([facility] [address] F10.20    History of tobacco abuse Z87.891    Chronic pain syndrome G89.4    Obesity, Class III, BMI 40-49.9 (morbid obesity) ([facility]) E66.01    Anxiety disorder F41.9    Encounter for monitoring of theophylline therapy Z51.81, Z79.899    Prerenal azotemia R79.89    Osteoarthritis of right knee M17.11       A/P:  S/p Right total knee replacement: diet and mobilization per Ortho  HTN: Norvasc. Monitor BP control   Astham/ ? Underlying COPD: Brovana, Pulmicort. Monitor respiratory status   DM: SSI.  Monitor glucose control  Hypercholesterolemia: Zocor   Gout: allopurinol  GERD: Pepcid  Depression:Prozac, Seroquel   DVT prophylaxis: Lovenox        HPI:     Loreto Amin is a 58 y.o. female with a hx of HTN, MI 6 years ago, current 1 PPD smoker for the past 52 years, asthma, DM-II, hypercholesterolemia, gout, depression who underwent right total knee arthroplasty. Patient denies previous history of CVA, TIA or thyroid disorders. Patient reports she has been having aching right knee pain for the past 12 months. Over the course of the past 2-3 months the pain has increased in frequency and severity increased to sharp, stabbing pains. Patient reports her gait was affected as well and she required the use of a mancini or walker for ambulation. Hospitalist Team is consulted for ongoing medical management. Past Medical History:   Diagnosis Date    Allergic rhinitis     Anxiety disorder     Bronchial asthma     Cataract of left eye     Chronic pain syndrome     Decreased calculated glomerular filtration rate (GFR) 2/6/2017    Calculated GFR is equivalent to that of CKD stage 2 = 60-89 ml/min    Depression     Dyslipidemia     Essential hypertension     Gout     History of tobacco abuse     Lichen simplex chronicus     Obesity, Class III, BMI 40-49.9 (morbid obesity) ([facility] [address]     Paronychia     Primary osteoarthritis of left hip     Type 2 diabetes mellitus without complication [facility])        Past Surgical History:   Procedure Laterality Date    HX HERNIA REPAIR      HX HIP REPLACEMENT Left 02/06/2017    S/P Left total hip replacement (2/6/2017 - Dr. Nadege Triana)    HX HYSTERECTOMY      HX WRIST FRACTURE TX Right     HX WRIST FRACTURE TX Left        History reviewed. No pertinent family history. Social History     Social History    Marital status: SINGLE     Spouse name: N/A    Number of children: N/A    Years of education: N/A     Social History Main Topics    Smoking status: Current Some Day Smoker     Packs/day: 0.50    Smokeless tobacco: Never Used    Alcohol use No    Drug use: No    Sexual activity: Not Currently     Other Topics Concern    None     Social History Narrative       Prior to Admission medications    Medication Sig Start Date End Date Taking?  Authorizing Provider   FLUoxetine (PROZAC) 40 mg capsule Take 40 mg by mouth daily. Yes Historical Provider   fluticasone (FLONASE) 50 mcg/actuation nasal spray daily. Yes Historical Provider   QUEtiapine (SEROQUEL) 50 mg tablet Take 50 mg by mouth nightly. Yes Historical Provider   albuterol (VENTOLIN HFA) 90 mcg/actuation inhaler Take 2 Puffs by inhalation every four (4) hours as needed for Wheezing or Shortness of Breath. Indications: Acute Asthma Attack   Yes Historical Provider   diazepam (VALIUM) 10 mg tablet Take 10 mg by mouth three (3) times daily as needed for Anxiety. Indications: ANXIETY   Yes Rosetta Fatima MD   amLODIPine (NORVASC) 5 mg tablet Take 5 mg by mouth daily. Indications: hypertension   Yes Rosetta Fatima MD   ranitidine (ZANTAC) 150 mg tablet Take 150 mg by mouth two (2) times a day. Indications: PREVENTION OF STRESS ULCER   Yes Rosetta Fatima MD   metFORMIN (GLUCOPHAGE) 1,000 mg tablet Take 1,000 mg by mouth two (2) times daily (with meals). Indications: type 2 diabetes mellitus   Yes Rosetta Fatima MD   simvastatin (ZOCOR) 20 mg tablet Take 20 mg by mouth nightly. Indications: DYSLIPIDEMIA   Yes Rosetta Fatima MD   theophylline ER,12 hour, (THEOCHRON) 300 mg tablet Take 300 mg by mouth two (2) times a day. Indications: BRONCHIAL ASTHMA   Yes Rosetta Fatima MD   allopurinol (ZYLOPRIM) 300 mg tablet Take 300 mg by mouth daily. Indications: GOUT   Yes Rosetta Fatima MD   diphenhydrAMINE (BENADRYL) 25 mg capsule Take 25 mg by mouth every six (6) hours as needed for Skin Irritation. Indications: PRURITUS OF SKIN    Rosetta Fatima MD   nitroglycerin (NITROSTAT) 0.4 mg SL tablet 0.4 mg by SubLINGual route every five (5) minutes as needed for Chest Pain. Rosetta Fatima MD   multivitamin, tx-iron-ca-min (THERA-M W/ IRON) 9 mg iron-400 mcg tab tablet Take 1 Tab by mouth daily.  Indications: VITAMIN DEFICIENCY PREVENTION    Rosetta Fatima MD       No Known Allergies    Review of Systems  - fever, - chills, - fatigue, - weight loss, - night sweats   - sore throat, - sinus congestion, - lymphadenopathy, - vision changes  - CP, -  palpitations  - dyspnea on exertion, - dyspnea at rest, - cough, - hemoptysis  - nausea, - vomiting, - diarrhea, - abdominal pain, - reflux, - dysphagia  - dysuria, - hematuria, - urinary frequency  - rash, - pruritis  - back pain, - neck pain, - myalgia, + arthralgia  - H/A, - numbness, - tingling, + weakness, - slurred speech    Physical Exam:      Visit Vitals    /68 (BP 1 Location: Left arm, BP Patient Position: At rest)    Pulse 62    Temp 98.6 °F (37 °C)    Resp 16    Ht 5' 3\" (1.6 m)    Wt 99.3 kg (219 lb)    SpO2 93%    BMI 38.79 kg/m2       Physical Exam:  Gen: In general, this is a well nourished [facility] American female in no acute distress on 2L NC.  HEENT: Sclerae nonicteric. Oral mucous membranes moist.    Neck: Supple with midline trachea. CV: RRR without murmur or rub appreciated. Resp:Respirations are unlabored without use of accessory muscles. Lung fields bilaterally with expiratory wheezes. Abd: Soft, nontender, nondistended. Normoactive bowel sounds. Extrem: Extremities are warm, without cyanosis or clubbing. No pitting pretibial edema. Palpable distal pulses X 4. BLE compartments soft, non tender. Right knee ace dressing CDI. Skin: Warm, no visible rashes. Neuro: Patient is alert, oriented, and cooperative. No obvious focal defects. Moves all 4 extremities.     Labs Reviewed:    Recent Results (from the past 24 hour(s))   GLUCOSE, POC    Collection Time: 07/24/17  7:14 AM   Result Value Ref Range    Glucose (POC) 117 (H) 70 - 110 mg/dL   GLUCOSE, POC    Collection Time: 07/24/17  9:40 AM   Result Value Ref Range    Glucose (POC) 142 (H) 70 - 419 mg/dL   METABOLIC PANEL, BASIC    Collection Time: 07/24/17 10:19 AM   Result Value Ref Range    Sodium 139 136 - 145 mmol/L    Potassium 4.5 3.5 - 5.5 mmol/L    Chloride 106 100 - 108 mmol/L    CO2 25 21 - 32 mmol/L    Anion gap 8 3.0 - 18 mmol/L    Glucose 163 (H) 74 - 99 mg/dL    BUN 17 7.0 - 18 MG/DL    Creatinine 1.12 0.6 - 1.3 MG/DL    BUN/Creatinine ratio 15 12 - 20      GFR est AA 60 (L) >60 ml/min/1.73m2    GFR est non-AA 49 (L) >60 ml/min/1.73m2    Calcium 9.4 8.5 - 10.1 MG/DL   HGB & HCT    Collection Time: 07/24/17 10:19 AM   Result Value Ref Range    HGB 11.6 (L) 12.0 - 16.0 g/dL    HCT 36.0 35.0 - 45.0 %   GLUCOSE, POC    Collection Time: 07/24/17  4:26 PM   Result Value Ref Range    Glucose (POC) 157 (H) 70 - 110 mg/dL                   JOSLYN [facility]  Pager:  [phone]  Office:  [phone]

## 2017-07-24 NOTE — PROGRESS NOTES
1433: Spoke with Dr. Jazzy Fontanez via telephone regarding need for post-op antibiotic order. New orders received. 1454: Pt resting in bed. Pt educated on use of IS 10 times per hours. States, \"I just put it down\". Pt complaining of pain. Pt educated on need to ask for pain meds as needed. Reviewed Medications and Side effects handout. Pt verbalized understanding. Pt educated on importance of mobility. Pt states she has had PT today. Encouraged to mobilize as much as possible with assistance to reduce risk of post-op complications. Ice reapplied to RLE. RN at bedside. Pt denies needs.

## 2017-07-24 NOTE — PROGRESS NOTES
Problem: Mobility Impaired (Adult and Pediatric)  Goal: *Acute Goals and Plan of Care (Insert Text)  PHYSICAL THERAPY SHORT TERM GOALS : All goals to be met in 7 days with R TKR WBAT precautions  1. Patient will perform/complete bedside mobility supine <-> sit with modified independence . 2. Patient will perform/complete bedside transfers EOB <-> chair with modified independence . 3. Patient will perform/complete bedside transfer sit <-> stand with modified independence. 4. Patient will perform/complete gait training 100 with the least restrictive device with modified independence . 5. Patient will perform /complete stair training 3 steps , up <-> down with the least restrictive device modified independence . 6. Patient will participate in lower extremity therapeutic exercise/activities with modified independence for 15 minutes . Therapist Omi Barrera 7/24/2017  Time Calculation: 26 mins  PHYSICAL THERAPY EVALUATION     Patient: Juliette Aguilar (59 y.o. female)  Date: 7/24/2017  Primary Diagnosis: osteoarthritis m17.11  Osteoarthritis of right knee  Procedure(s) (LRB):  right total knee replacement (Right) Day of Surgery   Precautions:   Fall, WBAT (RWBAT)      PROBLEM LIST:  Patient presents with the following problems:   Bed Mobility, Transfers, Gait, Strength, Range of Motion, Balance, Stairs and Precautions  ASSESSMENT :   Patient requires between moderate assistance  and minimal assistance/contact guard assist for bed mobility, transfers. 58 yr old female s/p R TKR , R WBAT. Patient is min/modA in all bed mobility . Supine upon arrival with 2 LPM of O2. She states that she does not use O2 at home. Attempted sit -> stand but pt refused to put weight on R side. Pt performed BLE TherEx during sitting. Placed back in supine post Eval.  Pre-eval p! : 8/10; post-eval p!: 9/10        Pt left in bed supine. Education: p! Control ,Plan of care, therex.  Pt demonstrated understanding but needs reinforcement. Patient will benefit from skilled intervention to address the above impairments. Patients rehabilitation potential is considered to be Good  Factors which may influence rehabilitation potential include:   [ ]         None noted  [ ]         Mental ability/status  [X]         Medical condition  [X]         Home/family situation and support systems  [X]         Safety awareness  [X]         Pain tolerance/management  [ ]         Other:        PLAN :  Recommendations and Planned Interventions:  [X]           Bed Mobility Training             [X]    Neuromuscular Re-Education  [X]           Transfer Training                   [ ]    Orthotic/Prosthetic Training  [X]           Gait Training                          [ ]    Modalities  [X]           Therapeutic Exercises          [ ]    Edema Management/Control  [X]           Therapeutic Activities            [X]    Patient and Family Training/Education  [ ]           Other (comment):     Frequency/Duration: Patient will be followed by physical therapy 1-2 times per day/4-7 days per week to address goals.   Discharge Recommendations: Inpatient Rehab  Further Equipment Recommendations for Discharge: rolling walker and N/A       SUBJECTIVE:   Patient stated I lived in  [place] for a long time now      OBJECTIVE DATA SUMMARY:       Past Medical History:   Diagnosis Date    Allergic rhinitis      Anxiety disorder      Bronchial asthma      Cataract of left eye      Chronic pain syndrome      Decreased calculated glomerular filtration rate (GFR) 2/6/2017     Calculated GFR is equivalent to that of CKD stage 2 = 60-89 ml/min    Depression      Dyslipidemia      Essential hypertension      Gout      History of tobacco abuse      Lichen simplex chronicus      Obesity, Class III, BMI 40-49.9 (morbid obesity) ([facility] [address]      Paronychia      Primary osteoarthritis of left hip      Type 2 diabetes mellitus without complication (HCC)       Past Surgical History:   Procedure Laterality Date    HX HERNIA REPAIR        HX HIP REPLACEMENT Left 02/06/2017     S/P Left total hip replacement (2/6/2017 - Dr. Swapnil Grande)    HX HYSTERECTOMY        HX WRIST FRACTURE TX Right      HX WRIST FRACTURE TX Left       Barriers to Learning/Limitations: None  Compensate with: visual, verbal, tactile, kinesthetic cues/model     G CODES:Mobility  Current  CK= 40-59%   Goal  CI= 1-19%. The severity rating is based on the Other [address] Sitting Balance Scale 2+/5        Eval Complexity: History: MEDIUM  Complexity : 1-2 comorbidities / personal factors will impact the outcome/ POC Exam:MEDIUM Complexity : 3 Standardized tests and measures addressing body structure, function, activity limitation and / or participation in recreation  Presentation: MEDIUM Complexity : Evolving with changing characteristics  Clinical Decision Making:High Complexity [facility] Sitting Balance Scale 2+/5 Overall Complexity:MEDIUM     [facility] Sitting Balance Scale 2+/5  0: Pt performs 25% or less of sitting activity (Max assist) CN, 100% impaired. 1: Pt supports self with upper extremities but requires therapist assistance. Pt performs 25-50% of effort (Mod assist) CM, 80% to <100% impaired. 1+: Pt supports self with upper extremities but requires therapist assistance. Pt performs >50% effort. (Min assist). CL, 60% to <80% impaired. 2: Pt supports self independently with both upper extremities. CL, 60% to <80% impaired. 2+: Pt support self independently with 1 upper extremity. CK, 40% to <60% impaired. 3: Pt sits without upper extremity support for up to 30 seconds. CK, 40% to <60% impaired. 3+: Pt sits without upper extremity support for 30 seconds or greater. CJ, 20% to <40% impaired. 4: Pt moves and returns trunkal midpoint 1-2 inches in one plane. CJ, 20% to <40% impaired. 4+: Pt moves and returns trunkal midpoint 1-2 inches in multiple planes. CI, 1% to <20% impaired. 5: Pt moves and returns trunkal midpoint in all planes greater than 2 inches. CH, 0% impaired. Prior Level of Function/Home Situation:   Home Situation  Home Environment: Private residence  # Steps to Enter: 3 (porch)  Rails to Enter: Yes  Hand Rails : Bilateral  One/Two Story Residence: One story  Living Alone: No  Support Systems: Family member(s)  Patient Expects to be Discharged to[de-identified] Private residence  Current DME Used/Available at Home: Lesley Mahoney, rolling, Walker, rollator, Wheelchair  Tub or Shower Type: Tub/Shower combination  Critical Behavior:  Neurologic State: Alert  Orientation Level: Oriented X4  Cognition: Appropriate decision making; Appropriate for age attention/concentration; Appropriate safety awareness; Follows commands  Safety/Judgement: Awareness of environment; Fall prevention;Good awareness of safety precautions  Psychosocial  Patient Behaviors: Calm; Cooperative  Skin Condition/Temp: Dry     Skin Integrity: Incision (comment); Wound (add Wound LDA)  Skin Integumentary  Skin Color: Appropriate for ethnicity  Skin Condition/Temp: Dry  Skin Integrity: Incision (comment); Wound (add Wound LDA)  Strength:    Strength: Generally decreased, functional  Tone & Sensation:   Tone: Normal  Sensation: Intact  Range Of Motion:  AROM: Generally decreased, functional (R knee flexion 22-60)     Functional Mobility:  Bed Mobility:  Rolling: Supervision  Supine to Sit: Minimum assistance; Moderate assistance; Additional time;Assist x1  Sit to Supine: Minimum assistance; Moderate assistance;Assist x1;Additional time  Scooting: Contact guard assistance  Transfers:  Balance:   Sitting: Impaired  Sitting - Static: Good (unsupported)  Sitting - Dynamic: Fair (occasional)  Ambulation/Gait Training:  Therapeutic Exercises:  glute sets, ankle pumps, SLRs, quad sets   Sitting : L SAQs  Pain:  Pre treatment pain level:8/10  Post treatment pain level: 9/10  Pain Scale 1: Numeric (0 - 10)  Pain Intensity 1: 2  Pain Location 1: Knee  Pain Orientation 1: Right  Pain Description 1: Aching; Intermittent     Activity Tolerance: Fair. Pt performed TherEx with SPT. Please refer to the flowsheet for vital signs taken during this treatment. After treatment:   [ ]         Patient left in no apparent distress sitting up in chair  [X]         Patient left in no apparent distress in bed  [X]         Call bell left within reach  [X]         Nursing notified  [ ]         Caregiver present  [ ]         Bed alarm activated      COMMUNICATION/EDUCATION:   [X]         Fall prevention education was provided and the patient/caregiver indicated understanding. [X]         Patient/family have participated as able in goal setting and plan of care. [ ]         Patient/family agree to work toward stated goals and plan of care. [ ]         Patient understands intent and goals of therapy, but is neutral about his/her participation. [ ]         Patient is unable to participate in goal setting and plan of care. Patient educated on the role of physical therapy during the acute stay  and the importance of mobility. JS.        Thank you for this referral.  Mauricio Session   Time Calculation: 26 mins

## 2017-07-24 NOTE — IP AVS SNAPSHOT
[address] Patient: Karina Beatty MRN: [id number] [id number] You are allergic to the following No active allergies Recent Documentation Height Weight BMI OB Status Smoking Status 1.6 m 99.3 kg 38.79 kg/m2 Hysterectomy Current Some Day Smoker Emergency Contacts Name Discharge Info Relation Home Work Mobile Cierra Ji DISCHARGE CAREGIVER [3] Friend [5]   [phone] About your hospitalization You were admitted on:  July 24, 2017 You last received care in the:  [address] [place] [address],[address] You were discharged on:  July 26, 2017 Unit phone number:  [phone] Why you were hospitalized Your primary diagnosis was:  Osteoarthritis Of Right Knee Your diagnoses also included:  Essential Hypertension, Bronchial Asthma, Dyslipidemia, Gout, History Of Tobacco Abuse, Type 2 Diabetes Mellitus Without Complication (Hcc) Providers Seen During Your Hospitalizations Provider Role Specialty Primary office phone Franco Barnett MD Attending Provider Orthopedic Surgery [phone] Your Primary Care Physician (PCP) Primary Care Physician Office Phone Office Fax Wanda Funez [phone] Follow-up Information Follow up With Details Comments Contact Info Roxane Giron NP   [address] [place] [address] Dr Martinez [phone] [facility] [id number] [phone] Franco Barnett MD In 1 month for follow up Vandana Rodas [phone] [facility] 2201 [facility] [address] 
[phone] Current Discharge Medication List  
  
START taking these medications Dose & Instructions Dispensing Information Comments Morning Noon Evening Bedtime  
 enoxaparin 40 mg/0.4 mL Commonly known as:  LOVENOX Your last dose was: Your next dose is: Dose:  40 mg  
0.4 mL by SubCUTAneous route every twenty-four (24) hours for 14 days. Indications: DEEP VEIN THROMBOSIS PREVENTION Quantity:  5.6 mL Refills:  0  
     
   
   
   
  
 naloxone 4 mg/actuation Spry Commonly known as:  [employer] Your last dose was: Your next dose is:    
   
   
 Use 1 spray intranasally into 1 nostril. Use a new Narcan nasal spray for subsequent doses and administer into alternating nostrils. May repeat every 2 to 3 minutes as needed until ambulance arrives. Indications: OPIATE-INDUCED RESPIRATORY DEPRESSION, OPIOID TOXICITY Quantity:  1 Package Refills:  0  
     
   
   
   
  
 oxyCODONE-acetaminophen 7.5-325 mg per tablet Commonly known as:  PERCOCET Your last dose was: Your next dose is:    
   
   
 Dose:  1-2 Tab Take 1-2 Tabs by mouth every four (4) hours as needed for Pain. Max Daily Amount: 12 Tabs. Quantity:  60 Tab Refills:  0  
     
   
   
   
  
 polyethylene glycol 17 gram packet Commonly known as:  Kreg Patron Your last dose was: Your next dose is:    
   
   
 Dose:  17 g Take 1 Packet by mouth daily for 12 days. While taking pain medications to help prevent constipation Quantity:  12 Packet Refills:  0 CONTINUE these medications which have NOT CHANGED Dose & Instructions Dispensing Information Comments Morning Noon Evening Bedtime  
 allopurinol 300 mg tablet Commonly known as:  Sathish Needle Your last dose was: Your next dose is:    
   
   
 Dose:  300 mg Take 300 mg by mouth daily. Indications: GOUT Refills:  0  
     
   
   
   
  
 amLODIPine 5 mg tablet Commonly known as:  Quiana Bocanegra Your last dose was: Your next dose is:    
   
   
 Dose:  5 mg Take 5 mg by mouth daily. Indications: hypertension Refills:  0  
     
   
   
   
  
 diazePAM 10 mg tablet Commonly known as:  VALIUM Your last dose was: Your next dose is:    
   
   
 Dose:  10 mg Take 10 mg by mouth three (3) times daily as needed for Anxiety. Indications: ANXIETY Refills:  0 FLUoxetine 40 mg capsule Commonly known as:  PROzac Your last dose was: Your next dose is:    
   
   
 Dose:  40 mg Take 40 mg by mouth daily. Refills:  0  
     
   
   
   
  
 fluticasone 50 mcg/actuation nasal spray Commonly known as:  Ramon [place] Your last dose was: Your next dose is:    
   
   
 daily. Refills:  0  
     
   
   
   
  
 metFORMIN 1,000 mg tablet Commonly known as:  GLUCOPHAGE Your last dose was: Your next dose is:    
   
   
 Dose:  1000 mg Take 1,000 mg by mouth two (2) times daily (with meals). Indications: type 2 diabetes mellitus Refills:  0  
     
   
   
   
  
 multivitamin, tx-iron-ca-min 9 mg iron-400 mcg Tab tablet Commonly known as:  THERA-M w/ IRON Your last dose was: Your next dose is:    
   
   
 Dose:  1 Tab Take 1 Tab by mouth daily. Indications: VITAMIN DEFICIENCY PREVENTION Refills:  0  
     
   
   
   
  
 nitroglycerin 0.4 mg SL tablet Commonly known as:  NITROSTAT Your last dose was: Your next dose is:    
   
   
 Dose:  0.4 mg  
0.4 mg by SubLINGual route every five (5) minutes as needed for Chest Pain. Refills:  0 QUEtiapine 50 mg tablet Commonly known as:  SEROquel Your last dose was: Your next dose is:    
   
   
 Dose:  50 mg Take 50 mg by mouth nightly. Refills:  0  
     
   
   
   
  
 raNITIdine 150 mg tablet Commonly known as:  ZANTAC Your last dose was: Your next dose is:    
   
   
 Dose:  150 mg Take 150 mg by mouth two (2) times a day. Indications: PREVENTION OF STRESS ULCER Refills:  0  
     
   
   
   
  
 simvastatin 20 mg tablet Commonly known as:  ZOCOR Your last dose was: Your next dose is: Dose:  20 mg Take 20 mg by mouth nightly. Indications: DYSLIPIDEMIA Refills:  0  
     
   
   
   
  
 theophylline ER(12 hour) 300 mg tablet Commonly known as:  Larri Proper Your last dose was: Your next dose is:    
   
   
 Dose:  300 mg Take 300 mg by mouth two (2) times a day. Indications: BRONCHIAL ASTHMA Refills:  0 VENTOLIN HFA 90 mcg/actuation inhaler Generic drug:  albuterol Your last dose was: Your next dose is:    
   
   
 Dose:  2 Puff Take 2 Puffs by inhalation every four (4) hours as needed for Wheezing or Shortness of Breath. Indications: Acute Asthma Attack Refills:  0 ASK your doctor about these medications Dose & Instructions Dispensing Information Comments Morning Noon Evening Bedtime diphenhydrAMINE 25 mg capsule Commonly known as:  BENADRYL Your last dose was: Your next dose is:    
   
   
 Dose:  25 mg Take 25 mg by mouth every six (6) hours as needed for Skin Irritation. Indications: PRURITUS OF SKIN Refills:  0 Where to Get Your Medications Information on where to get these meds will be given to you by the nurse or doctor. ! Ask your nurse or doctor about these medications  
  enoxaparin 40 mg/0.4 mL  
 naloxone 4 mg/actuation Spry  
 oxyCODONE-acetaminophen 7.5-325 mg per tablet  
 polyethylene glycol 17 gram packet Discharge Instructions DISCHARGE SUMMARY from Nurse The following personal items are in your possession at time of discharge: 
 
Dental Appliances: None Visual Aid: Glasses, With patient Home Medications: None Jewelry: None Clothing: None Other Valuables: None PATIENT INSTRUCTIONS: 
 
 
F-face looks uneven A-arms unable to move or move unevenly S-speech slurred or non-existent T-time-call 911 as soon as signs and symptoms begin-DO NOT go Back to bed or wait to see if you get better-TIME IS BRAIN. Warning Signs of HEART ATTACK Call 911 if you have these symptoms: 
? Chest discomfort. Most heart attacks involve discomfort in the center of the chest that lasts more than a few minutes, or that goes away and comes back. It can feel like uncomfortable pressure, squeezing, fullness, or pain. ? Discomfort in other areas of the upper body. Symptoms can include pain or discomfort in one or both arms, the back, neck, jaw, or stomach. ? Shortness of breath with or without chest discomfort. ? Other signs may include breaking out in a cold sweat, nausea, or lightheadedness. Don't wait more than five minutes to call [address]! Fast action can save your life. Calling 911 is almost always the fastest way to get lifesaving treatment. Emergency Medical Services staff can begin treatment when they arrive  up to an hour sooner than if someone gets to the hospital by car. The discharge information has been reviewed with the patient. The patient verbalized understanding. Discharge medications reviewed with the patient and appropriate educational materials and side effects teaching were provided. Patient armband removed and shredded. Discharge Instructions Attachments/References TKR (TOTAL KNEE REPLACEMENT): POST-OP (ENGLISH) DIABETES: NUTRITION TIPS (ENGLISH) SMOKING CESSATION: HEALTH BENEFITS: GENERAL INFO (ENGLISH) ENOXAPARIN (LOVENOX) (ENGLISH) Discharge Orders Procedure Order Date Status Priority Quantity Spec Type Associated Dx  DISCHARGE INSTRUCTIONS 07/26/17 0956 Normal Routine 1  Primary osteoarthritis of right knee [[id number]] Comments:  Staples out POD #10 Hypercontext Announcement We are excited to announce that we are making your provider's discharge notes available to you in Hypercontext. You will see these notes when they are completed and signed by the physician that discharged you from your recent hospital stay. If you have any questions or concerns about any information you see in Hypercontext, please call the Health Information Department where you were seen or reach out to your Primary Care Provider for more information about your plan of care. Introducing [facility]! [facility] introduces Hypercontext patient portal. Now you can access parts of your medical record, email your doctor's office, and request medication refills online. 1. In your internet browser, go to [email or web address] 2. Click on the First Time User? Click Here link in the Sign In box. You will see the New Member Sign Up page. 3. Enter your Hypercontext Access Code exactly as it appears below. You will not need to use this code after youve completed the sign-up process. If you do not sign up before the expiration date, you must request a new code. · Hypercontext Access Code: [facility] Expires: 10/18/2017 11:16 AM 
 
4. Enter the last four digits of your Social Security Number (xxxx) and Date of Birth (mm/dd/yyyy) as indicated and click Submit. You will be taken to the next sign-up page. 5. Create a Hypercontext ID. This will be your Hypercontext login ID and cannot be changed, so think of one that is secure and easy to remember. 6. Create a Hypercontext password. You can change your password at any time. 7. Enter your Password Reset Question and Answer. This can be used at a later time if you forget your password. 8. Enter your e-mail address. You will receive e-mail notification when new information is available in [address] 9. Click Sign Up.  You can now view and download portions of your medical record. 10. Click the Download Summary menu link to download a portable copy of your medical information. If you have questions, please visit the Frequently Asked Questions section of the Qgiv website. Remember, Qgiv is NOT to be used for urgent needs. For medical emergencies, dial 911. Now available from your iPhone and Android! General Information Please provide this summary of care documentation to your next provider. Patient Signature:  ____________________________________________________________ Date:  ____________________________________________________________  
  
Hermilo Pablo Provider Signature:  ____________________________________________________________ Date:  ____________________________________________________________ More Information Total Knee Replacement: What to Expect at Home Your Recovery When you leave the hospital, you should be able to move around with a walker or crutches. But you will need someone to help you at home for the next few weeks or until you have more energy and can move around better. If there is no one to help you at home, you may go to a rehabilitation center. You will go home with a bandage and stitches or staples. Change the bandage as your doctor tells you to. Your doctor will remove your stitches or staples 10 to 21 days after your surgery. You may still have some mild pain, and the area may be swollen for 3 to 6 months after surgery. Your knee will continue to improve for 6 to 12 months. You will probably use a walker for 1 to 3 weeks and then use crutches. When you are ready, you can use a cane. You will probably be able to walk on your own in 4 to 8 weeks. You will need to do months of physical rehabilitation (rehab) after a knee replacement. Rehab will help you strengthen the muscles of the knee and help you regain movement.  After you recover, your artificial knee will allow you to do normal daily activities with less pain or no pain at all. You may be able to hike, dance, ride a bike, and play golf. Talk to your doctor about whether you can do more strenuous activities. Always tell your caregivers that you have an artificial knee. How long it will take to walk on your own, return to normal activities, and go back to work depends on your health and how well your rehabilitation (rehab) program goes. The better you do with your rehab exercises, the quicker you will get your strength and movement back. This care sheet gives you a general idea about how long it will take for you to recover. But each person recovers at a different pace. Follow the steps below to get better as quickly as possible. How can you care for yourself at home? Activity · Rest when you feel tired. You may take a nap, but do not stay in bed all day. When you sit, use a chair with arms. You can use the arms to help you stand up. · Work with your physical therapist to find the best way to exercise. You may be able to take frequent, short walks using crutches or a walker. What you can do as your knee heals will depend on whether your new knee is cemented or uncemented. You may not be able to do certain things for a while if your new knee is uncemented. · After your knee has healed enough, you can do more strenuous activities with caution. ¨ You can golf, but use a golf cart, and do not wear shoes with spikes. ¨ You can bike on a flat road or on a stationary bike. Avoid biking up hills. ¨ Your doctor may suggest that you stay away from activities that put stress on your knee. These include tennis or badminton, squash or racquetball, contact sports like football, jumping (such as in basketball), jogging, or running. ¨ Avoid activities where you might fall. These include horseback riding, skiing, and mountain biking. · Do not sit for more than 1 hour at a time.  Get up and walk around for a while before you sit again. If you must sit for a long time, prop up your leg with a chair or footstool. This will help you avoid swelling. · Ask your doctor when you can shower. You may need to take sponge baths until your stitches or staples have been removed. · Ask your doctor when you can drive again. It may take up to 8 weeks after knee replacement surgery before it is safe for you to drive. · When you get into a car, sit on the edge of the seat. Then pull in your legs, and turn to face the front. · You should be able to do many everyday activities 3 to 6 weeks after your surgery. You will probably need to take 4 to 16 weeks off from work. When you can go back to work depends on the type of work you do and how you feel. · Ask your doctor when it is okay for you to have sex. · Do not lift anything heavier than 10 pounds and do not lift weights for 12 weeks. Diet · By the time you leave the hospital, you should be eating your normal diet. If your stomach is upset, try bland, low-fat foods like plain rice, broiled chicken, toast, and yogurt. Your doctor may suggest that you take iron and vitamin supplements. · Drink plenty of fluids (unless your doctor tells you not to). · Eat healthy foods, and watch your portion sizes. Try to stay at your ideal weight. Too much weight puts more stress on your new knee. · You may notice that your bowel movements are not regular right after your surgery. This is common. Try to avoid constipation and straining with bowel movements. You may want to take a fiber supplement every day. If you have not had a bowel movement after a couple of days, ask your doctor about taking a mild laxative. Medicines · Your doctor will tell you if and when you can restart your medicines. He or she will also give you instructions about taking any new medicines.  
· If you take blood thinners, such as warfarin (Coumadin), clopidogrel (Plavix), or aspirin, be sure to talk to your doctor. He or she will tell you if and when to start taking those medicines again. Make sure that you understand exactly what your doctor wants you to do. · Your doctor may give you a blood-thinning medicine to prevent blood clots. If you take a blood thinner, be sure you get instructions about how to take your medicine safely. Blood thinners can cause serious bleeding problems. This medicine could be in pill form or as a shot (injection). If a shot is necessary, your doctor will tell you how to do this. · Be safe with medicines. Take pain medicines exactly as directed. ¨ If the doctor gave you a prescription medicine for pain, take it as prescribed. ¨ If you are not taking a prescription pain medicine, ask your doctor if you can take an over-the-counter medicine. ¨ Plan to take your pain medicine 30 minutes before exercises. It is easier to prevent pain before it starts than to stop it once it has started. · If you think your pain medicine is making you sick to your stomach: 
¨ Take your medicine after meals (unless your doctor has told you not to). ¨ Ask your doctor for a different pain medicine. · If your doctor prescribed antibiotics, take them as directed. Do not stop taking them just because you feel better. You need to take the full course of antibiotics. Incision care · You will have a bandage over the cut (incision) and staples or stitches. Take the bandage off when your doctor says it is okay. · Your doctor will remove the staples or stitches 10 days to 3 weeks after the surgery and replace them with strips of tape. Leave the tape on for a week or until it falls off. Exercise · Your rehab program will give you a number of exercises to do to help you get back your knee's range of motion and strength. Always do them as your therapist tells you. Ice and elevation · For pain and swelling, put ice or a cold pack on the area for 10 to 20 minutes at a time. Put a thin cloth between the ice and your skin. Other instructions · Continue to wear your support stockings as your doctor says. These help to prevent blood clots. The length of time that you will have to wear them depends on your activity level and the amount of swelling. · Wear medical alert jewelry that says you may need antibiotics before any procedure, including dental work. You can buy this at most drugstores. · You have metal pieces in your knee. These may set off some airport metal detectors. Carry a medical alert card that says you have an artificial joint, just in case. Follow-up care is a key part of your treatment and safety. Be sure to make and go to all appointments, and call your doctor if you are having problems. It's also a good idea to know your test results and keep a list of the medicines you take. When should you call for help? Call 911 anytime you think you may need emergency care. For example, call if: 
· You passed out (lost consciousness). · You have severe trouble breathing. · You have sudden chest pain and shortness of breath, or you cough up blood. Call your doctor now or seek immediate medical care if: 
· You have signs of infection, such as: 
¨ Increased pain, swelling, warmth, or redness. ¨ Red streaks leading from the incision. ¨ Pus draining from the incision. ¨ A fever. · You have signs of a blood clot, such as: 
¨ Pain in your calf, back of the knee, thigh, or groin. ¨ Redness and swelling in your leg or groin. · Your incision comes open and begins to bleed, or the bleeding increases. · You have pain that does not get better after you take pain medicine. Watch closely for changes in your health, and be sure to contact your doctor if: 
· You do not have a bowel movement after taking a laxative. Where can you learn more? Go to http://robinson-gilbert.info/. Enter B236 in the search box to learn more about \"Total Knee Replacement: What to Expect at Home. \" Current as of: March 21, 2017 Content Version: 11.3 © 3983-6059 [employer]. Care instructions adapted under license by [facility] (which disclaims liability or warranty for this information). If you have questions about a medical condition or this instruction, always ask your healthcare professional. [place] [address] any warranty or liability for your use of this information. Nutrition Tips for Diabetes: After Your Visit Your Care Instructions A healthy diet is important to manage diabetes. It helps you lose weight (if you need to) and keep it off. It gives you the nutrition and energy your body needs and helps prevent heart disease. But a diet for diabetes does not mean that you have to eat special foods. You can eat what your family eats, including occasional sweets and other favorites. But you do have to pay attention to how often you eat and how much you eat of certain foods. The right plan for you will give you meals that help you keep your blood sugar at healthy levels. Try to eat a variety of foods and to spread carbohydrate throughout the day. Carbohydrate raises blood sugar higher and more quickly than any other nutrient does. Carbohydrate is found in sugar, breads and cereals, fruit, starchy vegetables such as potatoes and corn, and milk and yogurt. You may want to work with a dietitian or diabetes educator to help you plan meals and snacks. A dietitian or diabetes educator also can help you lose weight if that is one of your goals. The following tips can help you enjoy your meals and stay healthy. Follow-up care is a key part of your treatment and safety. Be sure to make and go to all appointments, and call your doctor if you are having problems. Its also a good idea to know your test results and keep a list of the medicines you take. How can you care for yourself at home? · Learn which foods have carbohydrate and how much carbohydrate to eat. A dietitian or diabetes educator can help you learn to keep track of how much carbohydrate you eat. · Spread carbohydrate throughout the day. Eat some carbohydrate at all meals, but do not eat too much at any one time. · Plan meals to include food from all the food groups. These are the food groups and some example portion sizes: ¨ Grains: 1 slice of bread (1 ounce), ½ cup of cooked cereal, and 1/3 cup of cooked pasta or rice. These have about 15 grams of carbohydrate in a serving. Choose whole grains such as whole wheat bread or crackers, oatmeal, and brown rice more often than refined grains. ¨ Fruit: 1 small fresh fruit, such as an apple or orange; ½ of a banana; ½ cup of chopped, cooked, or canned fruit; ½ cup of fruit juice; 1 cup of melon or raspberries; and 2 tablespoons of dried fruit. These have about 15 grams of carbohydrate in a serving. ¨ Dairy: 1 cup of nonfat or low-fat milk and 2/3 cup of plain yogurt. These have about 15 grams of carbohydrate in a serving. ¨ Protein foods: Beef, chicken, turkey, fish, eggs, tofu, cheese, cottage cheese, and peanut butter. A serving size of meat is 3 ounces, which is about the size of a deck of cards. Examples of meat substitute serving sizes (equal to 1 ounce of meat) are 1/4 cup of cottage cheese, 1 egg, 1 tablespoon of peanut butter, and ½ cup of tofu. These have very little or no carbohydrate per serving. ¨ Vegetables: Starchy vegetables such as ½ cup of cooked dried beans, peas, potatoes, or corn have about 15 grams of carbohydrate. Nonstarchy vegetables have very little carbohydrate, such as 1 cup of raw leafy vegetables (such as spinach), ½ cup of other vegetables (cooked or chopped), and 3/4 cup of vegetable juice. · Use the plate format to plan meals.  It is a good, quick way to make sure that you have a balanced meal. It also helps you spread carbohydrate throughout the day. You divide your plate by types of foods. Put vegetables on half the plate, meat or meat substitutes on one-quarter of the plate, and a grain or starchy vegetable (such as brown rice or a potato) in the final quarter of the plate. To this you can add a small piece of fruit and 1 cup of milk or yogurt, depending on how much carbohydrate you are supposed to eat at a meal. 
· Talk to your dietitian or diabetes educator about ways to add limited amounts of sweets into your meal plan. You can eat these foods now and then, as long as you include the amount of carbohydrate they have in your daily carbohydrate allowance. · If you drink alcohol, limit it to no more than 1 drink a day for women and 2 drinks a day for men. If you are pregnant, no amount of alcohol is known to be safe. · Protein, fat, and fiber do not raise blood sugar as much as carbohydrate does. If you eat a lot of these nutrients in a meal, your blood sugar will rise more slowly than it would otherwise. · Limit saturated fats, such as those from meat and dairy products. Try to replace it with monounsaturated fat, such as olive oil. This is a healthier choice because people who have diabetes are at higher-than-average risk of heart disease. But use a modest amount of olive oil. A tablespoon of olive oil has 14 grams of fat and 120 calories. · Exercise lowers blood sugar. If you take insulin by shots or pump, you can use less than you would if you were not exercising. Keep in mind that timing matters. If you exercise within 1 hour after a meal, your body may need less insulin for that meal than it would if you exercised 3 hours after the meal. Test your blood sugar to find out how exercise affects your need for insulin. · Exercise on most days of the week. Aim for at least 30 minutes. Exercise helps you stay at a healthy weight and helps your body use insulin. Walking is an easy way to get exercise. Gradually increase the amount you walk every day. You also may want to swim, bike, or do other activities. When you eat out · Learn to estimate the serving sizes of foods that have carbohydrate. If you measure food at home, it will be easier to estimate the amount in a serving of restaurant food. · If the meal you order has too much carbohydrate (such as potatoes, corn, or baked beans), ask to have a low-carbohydrate food instead. Ask for a salad or green vegetables. · If you use insulin, check your blood sugar before and after eating out to help you plan how much to eat in the future. · If you eat more carbohydrate at a meal than you had planned, take a walk or do other exercise. This will help lower your blood sugar. Where can you learn more? Go to UTOPY.be Enter T226 in the search box to learn more about \"Nutrition Tips for Diabetes: After Your Visit. \"  
© 6373-6627 [employer]wise, [employer]orporated. Care instructions adapted under license by [facility] [employer] (which disclaims liability or warranty for this information). This care instruction is for use with your licensed healthcare professional. If you have questions about a medical condition or this instruction, always ask your healthcare professional. [address] any warranty or liability for your use of this information. Content Version: 46.8.232440; Current as of: June 4, 2014 Learning About Benefits From Quitting Smoking How does quitting smoking make you healthier? If you're thinking about quitting smoking, you may have a few reasons to be smoke-free. Your health may be one of them. · When you quit smoking, you lower your risks for cancer, lung disease, heart attack, stroke, blood vessel disease, and blindness from macular degeneration. · When you're smoke-free, you get sick less often, and you heal faster. You are less likely to get colds, flu, bronchitis, and pneumonia. · As a nonsmoker, you may find that your mood is better and you are less stressed. When and how will you feel healthier? Quitting has real health benefits that start from day 1 of being smoke-free. And the longer you stay smoke-free, the healthier you get and the better you feel. The first hours · After just 20 minutes, your blood pressure and heart rate go down. That means there's less stress on your heart and blood vessels. · Within 12 hours, the level of carbon monoxide in your blood drops back to normal. That makes room for more oxygen. With more oxygen in your body, you may notice that you have more energy than when you smoked. After 2 weeks · Your lungs start to work better. · Your risk of heart attack starts to drop. After 1 month · When your lungs are clear, you cough less and breathe deeper, so it's easier to be active. · Your sense of taste and smell return. That means you can enjoy food more than you have since you started smoking. Over the years · After 1 year, your risk of heart disease is half what it would be if you kept smoking. · After 5 years, your risk of stroke starts to shrink. Within a few years after that, it's about the same as if you'd never smoked. · After 10 years, your risk of dying from lung cancer is cut by about half. And your risk for many other types of cancer is lower too. How would quitting help others in your life? When you quit smoking, you improve the health of everyone who now breathes in your smoke. · Their heart, lung, and cancer risks drop, much like yours. · They are sick less. For babies and small children, living smoke-free means they're less likely to have ear infections, pneumonia, and bronchitis. · If you're a woman who is or will be pregnant someday, quitting smoking means a healthier . · Children who are close to you are less likely to become adult smokers. Where can you learn more? Go to http://robinson-gilbert.info/. Enter 052 [id number] [id number] in the search box to learn more about \"Learning About Benefits From Quitting Smoking. \" Current as of: March 20, 2017 Content Version: 11.3 © 1721-4485 [employer]. Care instructions adapted under license by [employer] (which disclaims liability or warranty for this information). If you have questions about a medical condition or this instruction, always ask your healthcare professional. [place] [address] any warranty or liability for your use of this information. Enoxaparin (Lovenox): Care Instructions Your Care Instructions Enoxaparin (Lovenox) is an anticoagulant medicine. It is one of a class of anticoagulants called low molecular weight heparin. Many people call these medicines blood thinners. They don't actually thin the blood, but they increase the time it takes a blood clot to form. This reduces the chance of a blood clot in the leg veins (deep vein thrombosis) or in the lungs (pulmonary embolism). Enoxaparin is a shot (injection). You or someone caring for you will inject it once or twice a day. Most people need shots for 5 to 10 days, but in some cases it can be longer. Your doctor will tell you how long you need to have the shots. Enoxaparin is used to: · Treat deep vein thrombosis (DVT), which is a blood clot in the legs, pelvis, or arms. · Reduce the chance of getting blood clots after certain surgeries. For example, you may take enoxaparin after knee or hip replacement surgery. · Reduce the chance of getting blood clots in people who are likely to get them and who are not active for a long period of time. For example, you may need enoxaparin if you need to stay in bed for a long time because of a health problem.  
· Reduce the chance of blood clots when another blood thinner is stopped for a short time. For example, if you take warfarin and need surgery, your doctor may ask you to stop taking warfarin for a short time before the surgery. You will take enoxaparin to help prevent blood clots before the surgery. After the surgery, your doctor will tell you when it is safe to start taking warfarin again. This is called bridge therapy. Follow-up care is a key part of your treatment and safety. Be sure to make and go to all appointments, and call your doctor if you are having problems. It's also a good idea to know your test results and keep a list of the medicines you take. How can you care for yourself at home? How to inject enoxaparin You will get a prescription for prefilled syringes. Inject the medicine at the same time every day unless your doctor gives you other instructions. 1. Wash and dry your hands. 2. Sit or lie in a position that lets you see your belly. 3. Clean the injection site with an alcohol pad or swab, and let it dry. Choose a site on the right or left side of your belly, at least 2 inches from your belly button. Change the site each time you inject the medicine. 4. Remove the needle cap by pulling it straight off. Don't twist it. 5. Hold the syringe like a pencil in one hand. With the other hand, pinch an area of the injection site skin. You should have a \"fold\" in the skin. 6. Insert the entire needle straight down into the fold of skin. Don't insert the needle at an angle. 7. Press the plunger with your thumb until the syringe is empty. 8. Pull the needle straight out and let go of the skin. 9. Point the needle away from you and press down on the plunger. The needle will be covered. Take precautions · Don't rub the injection site. This could cause bruising. · Don't push air bubbles out of the syringe unless your doctor tells you to. Each syringe comes with air bubbles. · Don't stop taking enoxaparin without talking to your doctor. · If you are taking a blood thinner, be sure you get instructions about how to take your medicine safely. Blood thinners can cause serious bleeding problems. · Talk to your doctor before you take any prescription medicines, over-the-counter medicines, antibiotics, vitamins, or herbal products. · Don't take the following medicines unless your doctor says it's okay: ¨ Aspirin, products like aspirin (salicylates), or products that contain aspirin ¨ Nonsteroidal anti-inflammatory drugs (NSAIDs), such as ibuprofen (Advil, Motrin) and naproxen (Aleve) · Store enoxaparin at room temperature. Don't put it in the refrigerator or freezer. When should you call for help? Call 911 anytime you think you may need emergency care. For example, call if: 
· You cough up blood. · You vomit blood or what looks like coffee grounds. · You pass maroon or very bloody stools. Call your doctor now or seek immediate medical care if: 
· You have new bruises that are away from the injection site or blood spots under your skin. · You have a nosebleed. · You have blood in your urine. · Your stools are black and tarlike or have streaks of blood. · You have vaginal bleeding when you are not having your period, or heavy period bleeding. Watch closely for changes in your health, and be sure to contact your doctor if: 
· You have questions about enoxaparin or your treatment. Where can you learn more? Go to http://robinson-gilbert.info/. Enter P266 in the search box to learn more about \"Enoxaparin (Lovenox): Care Instructions. \" Current as of: October 13, 2016 Content Version: 11.3 © 8965-3364 [employer]. Care instructions adapted under license by [employer] (which disclaims liability or warranty for this information).  If you have questions about a medical condition or this instruction, always ask your healthcare professional. [employer] disclaims any warranty or liability for your use of this information.

## 2017-07-24 NOTE — PROGRESS NOTES
1115  Received pt from PACU, alert and oriented, right knee wiggle toes and warm to touch, was crooked, refused to let it straightened mice pack to  Knees, slowly straigthened right leg, pillw  To both sides, pain management written on the board. 1330  Medicated alterating with oral and IV, PT came to work on her, due to void. 1456  Not time yet for pain med, given benadryl to relax her. 1800  Medicated for pain, moving  Extremities, wiggle toes, dressing is  Dry.

## 2017-07-24 NOTE — ANESTHESIA PREPROCEDURE EVALUATION
Anesthetic History   No history of anesthetic complications            Review of Systems / Medical History  Patient summary reviewed and pertinent labs reviewed    Pulmonary          Smoker  Asthma : well controlled       Neuro/Psych   Within defined limits           Cardiovascular    Hypertension              Exercise tolerance: >4 METS     GI/Hepatic/Renal  Within defined limits              Endo/Other    Diabetes: type 2    Morbid obesity     Other Findings   Comments:   Risk Factors for Postoperative nausea/vomiting:       History of postoperative nausea/vomiting? NO       Female? YES       Motion sickness? NO       Intended opioid administration for postoperative analgesia? YES      Smoking Abstinence  Current Smoker? YES  Elective Surgery? YES  Seen preoperatively by anesthesiologist or proxy prior to day of surgery? YES  Pt abstained from smoking 24 hours prior to anesthesia?  NO           Physical Exam    Airway  Mallampati: II  TM Distance: 4 - 6 cm  Neck ROM: normal range of motion   Mouth opening: Normal     Cardiovascular  Regular rate and rhythm,  S1 and S2 normal,  no murmur, click, rub, or gallop  Rhythm: regular  Rate: normal         Dental    Dentition: Poor dentition     Pulmonary  Breath sounds clear to auscultation               Abdominal  GI exam deferred       Other Findings            Anesthetic Plan    ASA: 3  Anesthesia type: general and regional          Induction: Intravenous  Anesthetic plan and risks discussed with: Patient

## 2017-07-24 NOTE — PROGRESS NOTES
OT order received and chart reviewed. Pt had surgery today; will follow up tomorrow for skilled OT evaluation.  Will follow up tomorrow AM.    I appreciate the referral.    Phyliss Castleman, MS OTR/L  Office Ext: [phone]  Pager: [phone]

## 2017-07-24 NOTE — PERIOP NOTES
5663 Patient arrived PACU via bed, VSS, bedside report completed, will continue to monitor  4280 surgeon at bedside  0955 Friend updated  1015 Phlebotomy at bedside  1025 Xray at bedside  1103 report given to [id number] [facility] REPORT:    [facility]  being transferred to [place] [id number] for routine post - op       Report consisted of patients Situation, Background, Assessment and   Recommendations(SBAR). Information from the following report(s) SBAR, Procedure Summary and MAR was reviewed with the receiving nurse. Lines:   Peripheral IV 07/24/17 Right Hand (Active)   Site Assessment Clean, dry, & intact 7/24/2017 10:50 AM   Phlebitis Assessment 0 7/24/2017 10:50 AM   Infiltration Assessment 0 7/24/2017 10:50 AM   Dressing Status Clean, dry, & intact 7/24/2017 10:50 AM   Dressing Type Tape;Transparent 7/24/2017 10:50 AM   Hub Color/Line Status Pink; Infusing 7/24/2017 10:50 AM   Alcohol Cap Used Yes 7/24/2017  7:10 AM        Opportunity for questions and clarification was provided.       Patient transported with:   O2 @ 2 liters  Tech

## 2017-07-24 NOTE — ANESTHESIA POSTPROCEDURE EVALUATION
Post-Anesthesia Evaluation and Assessment    Patient: Bree Bautista MRN: [id number]  SSN: [id number]    YOB: 1955  Age: 58 y.o. Sex: female       Cardiovascular Function/Vital Signs  Visit Vitals    /71    Pulse 81    Temp 36.6 °C (97.8 °F)    Resp 17    Ht 5' 3\" (1.6 m)    Wt 99.3 kg (219 lb)    SpO2 94%    BMI 38.79 kg/m2       Patient is status post general, regional anesthesia for Procedure(s):  right total knee replacement. Nausea/Vomiting: None    Postoperative hydration reviewed and adequate. Pain:  Pain Scale 1: Numeric (0 - 10) (07/24/17 1050)  Pain Intensity 1: 0 (07/24/17 1050)   Managed    Neurological Status:   Neuro (WDL): Within Defined Limits (07/24/17 1050)  Neuro  LUE Motor Response: Purposeful (07/24/17 1050)  LLE Motor Response: Purposeful (07/24/17 1050)  RUE Motor Response: Purposeful (07/24/17 1050)  RLE Motor Response: Purposeful (07/24/17 1050)   At baseline    Mental Status and Level of Consciousness: Alert and oriented     Pulmonary Status:   O2 Device: Nasal cannula (07/24/17 1050)   Adequate oxygenation and airway patent    Complications related to anesthesia: None    Post-anesthesia assessment completed.  No concerns    Signed By: Gertrude Monroe CRNA     July 24, 2017

## 2017-07-24 NOTE — H&P
Date of Surgery Update:  Usha Pennington was seen and examined. There have been no significant clinical changes since the completion of the originally dated History and Physical.    Original H&P to be scanned into system. Risks, benefits and alternatives reviewed with ms anders to her satisfaction and she confirms her desire to proceed with right total knee re[placemetn. She confirms she is well informed and desires to proceed.       Signed By: Lavonne Beckwith MD     July 24, 2017 7:35 AM

## 2017-07-24 NOTE — ANESTHESIA PROCEDURE NOTES
Peripheral Block    Start time: 7/24/2017 7:23 AM  End time: 7/24/2017 7:33 AM  Performed by: Myranda Bailey  Authorized by: Myranda Bailey       Pre-procedure: Indications: at surgeon's request and post-op pain management    Preanesthetic Checklist: patient identified, risks and benefits discussed, site marked, timeout performed, anesthesia consent given and patient being monitored      Block Type:   Block Type:  Femoral single shot  Laterality:  Right  Monitoring:  Standard ASA monitoring, continuous pulse ox, oxygen, responsive to questions, heart rate and frequent vital sign checks  Injection Technique:  Single shot  Procedures: ultrasound guided    Patient Position: supine  Prep: chlorhexidine    Location:  Upper thigh  Needle Type:  Ultraplex  Needle Gauge:  22 G  Needle Localization:  Ultrasound guidance and nerve stimulator  Motor Response: minimal motor response >0.4 mA    Medication Injected:  0.5%  ropivacaine  Volume (mL):  30    Assessment:  Number of attempts:  1  Injection Assessment:  Incremental injection every 5 mL, no paresthesia, ultrasound image on chart, local visualized surrounding nerve on ultrasound, negative aspiration for blood and no intravascular symptoms  Patient tolerance:  Patient tolerated the procedure well with no immediate complications  For Vitals see nursing notes.      Iftikhar Cortez MD  7:33 AM

## 2017-07-24 NOTE — PROGRESS NOTES
TRANSFER - IN REPORT:    Verbal report received from aKran Troy on [employer]  being received from PACU for routine post - op      Report consisted of patients Situation, Background, Assessment and   Recommendations(SBAR). Information from the following report(s) SBAR was reviewed with the receiving nurse. Opportunity for questions and clarification was provided. Assessment completed upon patients arrival to unit and care assumed. Received pt via bed awake and alert. Oriented to call bell, phone and IS with pt giving return demonstration. Ace wrap to right leg which is bent. + pedal pulse and able to move toes. C/o pain to back 5/10.

## 2017-07-25 LAB
GLUCOSE BLD STRIP.AUTO-MCNC: 139 MG/DL (ref 70–110)
GLUCOSE BLD STRIP.AUTO-MCNC: 156 MG/DL (ref 70–110)
GLUCOSE BLD STRIP.AUTO-MCNC: 157 MG/DL (ref 70–110)
GLUCOSE BLD STRIP.AUTO-MCNC: 185 MG/DL (ref 70–110)
HCT VFR BLD AUTO: 30.8 % (ref 35–45)
HGB BLD-MCNC: 10.1 G/DL (ref 12–16)

## 2017-07-25 PROCEDURE — 94640 AIRWAY INHALATION TREATMENT: CPT

## 2017-07-25 PROCEDURE — 97535 SELF CARE MNGMENT TRAINING: CPT

## 2017-07-25 PROCEDURE — 97530 THERAPEUTIC ACTIVITIES: CPT

## 2017-07-25 PROCEDURE — 74011000250 HC RX REV CODE- 250: Performed by: NURSE PRACTITIONER

## 2017-07-25 PROCEDURE — 74011250636 HC RX REV CODE- 250/636: Performed by: ORTHOPAEDIC SURGERY

## 2017-07-25 PROCEDURE — 74011250637 HC RX REV CODE- 250/637: Performed by: NURSE PRACTITIONER

## 2017-07-25 PROCEDURE — 82962 GLUCOSE BLOOD TEST: CPT

## 2017-07-25 PROCEDURE — 74011250637 HC RX REV CODE- 250/637: Performed by: ORTHOPAEDIC SURGERY

## 2017-07-25 PROCEDURE — 65270000029 HC RM PRIVATE

## 2017-07-25 PROCEDURE — 97110 THERAPEUTIC EXERCISES: CPT

## 2017-07-25 PROCEDURE — 74011250637 HC RX REV CODE- 250/637: Performed by: PHYSICIAN ASSISTANT

## 2017-07-25 PROCEDURE — 77030020255 HC SOL INJ LR 1000ML BG

## 2017-07-25 PROCEDURE — 97166 OT EVAL MOD COMPLEX 45 MIN: CPT

## 2017-07-25 PROCEDURE — 85018 HEMOGLOBIN: CPT | Performed by: ORTHOPAEDIC SURGERY

## 2017-07-25 PROCEDURE — 74011636637 HC RX REV CODE- 636/637: Performed by: NURSE PRACTITIONER

## 2017-07-25 PROCEDURE — 36415 COLL VENOUS BLD VENIPUNCTURE: CPT | Performed by: ORTHOPAEDIC SURGERY

## 2017-07-25 RX ORDER — CELECOXIB 100 MG/1
100 CAPSULE ORAL 2 TIMES DAILY
Status: DISCONTINUED | OUTPATIENT
Start: 2017-07-25 | End: 2017-07-26 | Stop reason: HOSPADM

## 2017-07-25 RX ORDER — TRAMADOL HYDROCHLORIDE 50 MG/1
50 TABLET ORAL
Status: DISCONTINUED | OUTPATIENT
Start: 2017-07-25 | End: 2017-07-25

## 2017-07-25 RX ORDER — OXYCODONE HYDROCHLORIDE 5 MG/1
10-15 TABLET ORAL
Status: DISCONTINUED | OUTPATIENT
Start: 2017-07-25 | End: 2017-07-26 | Stop reason: HOSPADM

## 2017-07-25 RX ORDER — POLYETHYLENE GLYCOL 3350 17 G/17G
17 POWDER, FOR SOLUTION ORAL DAILY
Status: DISCONTINUED | OUTPATIENT
Start: 2017-07-25 | End: 2017-07-26 | Stop reason: HOSPADM

## 2017-07-25 RX ORDER — ACETAMINOPHEN 325 MG/1
650 TABLET ORAL EVERY 6 HOURS
Status: DISCONTINUED | OUTPATIENT
Start: 2017-07-25 | End: 2017-07-26 | Stop reason: HOSPADM

## 2017-07-25 RX ADMIN — FAMOTIDINE 20 MG: 20 TABLET ORAL at 08:51

## 2017-07-25 RX ADMIN — MORPHINE SULFATE 2 MG: 2 INJECTION, SOLUTION INTRAMUSCULAR; INTRAVENOUS at 10:10

## 2017-07-25 RX ADMIN — DOCUSATE SODIUM 100 MG: 100 CAPSULE, LIQUID FILLED ORAL at 08:51

## 2017-07-25 RX ADMIN — Medication 10 ML: at 07:01

## 2017-07-25 RX ADMIN — BUDESONIDE 500 MCG: 0.5 INHALANT RESPIRATORY (INHALATION) at 07:25

## 2017-07-25 RX ADMIN — CYCLOBENZAPRINE HYDROCHLORIDE 10 MG: 10 TABLET, FILM COATED ORAL at 01:15

## 2017-07-25 RX ADMIN — MORPHINE SULFATE 2 MG: 2 INJECTION, SOLUTION INTRAMUSCULAR; INTRAVENOUS at 15:01

## 2017-07-25 RX ADMIN — ACETAMINOPHEN 650 MG: 325 TABLET, FILM COATED ORAL at 12:09

## 2017-07-25 RX ADMIN — SIMVASTATIN 20 MG: 20 TABLET, FILM COATED ORAL at 21:50

## 2017-07-25 RX ADMIN — SODIUM CHLORIDE, SODIUM LACTATE, POTASSIUM CHLORIDE, AND CALCIUM CHLORIDE 75 ML/HR: 600; 310; 30; 20 INJECTION, SOLUTION INTRAVENOUS at 02:35

## 2017-07-25 RX ADMIN — Medication 10 ML: at 14:00

## 2017-07-25 RX ADMIN — INSULIN LISPRO 2 UNITS: 100 INJECTION, SOLUTION INTRAVENOUS; SUBCUTANEOUS at 21:51

## 2017-07-25 RX ADMIN — OXYCODONE HYDROCHLORIDE AND ACETAMINOPHEN 1 TABLET: 7.5; 325 TABLET ORAL at 03:55

## 2017-07-25 RX ADMIN — MULTIPLE VITAMINS W/ MINERALS TAB 1 TABLET: TAB at 08:51

## 2017-07-25 RX ADMIN — MORPHINE SULFATE 2 MG: 2 INJECTION, SOLUTION INTRAMUSCULAR; INTRAVENOUS at 02:36

## 2017-07-25 RX ADMIN — CELECOXIB 100 MG: 100 CAPSULE ORAL at 17:01

## 2017-07-25 RX ADMIN — FAMOTIDINE 20 MG: 20 TABLET ORAL at 17:00

## 2017-07-25 RX ADMIN — CELECOXIB 200 MG: 100 CAPSULE ORAL at 08:51

## 2017-07-25 RX ADMIN — POLYETHYLENE GLYCOL 3350 17 G: 17 POWDER, FOR SOLUTION ORAL at 12:09

## 2017-07-25 RX ADMIN — INSULIN LISPRO 2 UNITS: 100 INJECTION, SOLUTION INTRAVENOUS; SUBCUTANEOUS at 08:52

## 2017-07-25 RX ADMIN — ALLOPURINOL 300 MG: 300 TABLET ORAL at 08:51

## 2017-07-25 RX ADMIN — ACETAMINOPHEN 650 MG: 325 TABLET, FILM COATED ORAL at 17:00

## 2017-07-25 RX ADMIN — ARFORMOTEROL TARTRATE 15 MCG: 15 SOLUTION RESPIRATORY (INHALATION) at 07:25

## 2017-07-25 RX ADMIN — MORPHINE SULFATE 2 MG: 2 INJECTION, SOLUTION INTRAMUSCULAR; INTRAVENOUS at 04:22

## 2017-07-25 RX ADMIN — QUETIAPINE FUMARATE 50 MG: 25 TABLET, FILM COATED ORAL at 21:51

## 2017-07-25 RX ADMIN — ENOXAPARIN SODIUM 40 MG: 40 INJECTION SUBCUTANEOUS at 16:57

## 2017-07-25 RX ADMIN — Medication 10 ML: at 21:56

## 2017-07-25 RX ADMIN — OXYCODONE HYDROCHLORIDE 15 MG: 5 TABLET ORAL at 13:14

## 2017-07-25 RX ADMIN — AMLODIPINE BESYLATE 5 MG: 5 TABLET ORAL at 08:51

## 2017-07-25 RX ADMIN — OXYCODONE HYDROCHLORIDE 15 MG: 5 TABLET ORAL at 20:46

## 2017-07-25 RX ADMIN — FLUOXETINE 40 MG: 20 CAPSULE ORAL at 08:51

## 2017-07-25 RX ADMIN — INSULIN LISPRO 2 UNITS: 100 INJECTION, SOLUTION INTRAVENOUS; SUBCUTANEOUS at 12:09

## 2017-07-25 RX ADMIN — MORPHINE SULFATE 2 MG: 2 INJECTION, SOLUTION INTRAMUSCULAR; INTRAVENOUS at 06:59

## 2017-07-25 RX ADMIN — DOCUSATE SODIUM 100 MG: 100 CAPSULE, LIQUID FILLED ORAL at 17:00

## 2017-07-25 RX ADMIN — OXYCODONE HYDROCHLORIDE AND ACETAMINOPHEN 1 TABLET: 7.5; 325 TABLET ORAL at 08:51

## 2017-07-25 NOTE — PROGRESS NOTES
Nutrition initial assessment/Plan of care      RECOMMENDATIONS:   1. Consistent Carbohydrate  2. CIB  3. Monitor weight and PO intake  4. RD to follow     GOALS:   1. PO intake meets >75% of protein/calorie needs by 8/1  2. Weight Maintenance (+/- 1-2#) by 8/1          ASSESSMENT:   Per BMI of 38.8, wt is classified as obese. Labs noted. Seen per MST score. Nutrition recommendations listed. RD to follow. Nutrition Diagnoses:   Obesity  related to excessive energy intake as evidenced by BMI of 38.8    Nutrition Risk:  [] High  [] Moderate [x]  Low    SUBJECTIVE/OBJECTIVE:    Pt states appetite is usually good, now it is fair. Not a big breakfast eater, usually just cereal, 2 meals/day. Taking Metformin x 6-7 years. BS usually run 116-130 at home. Has had a recent unplanned wt loss, unable to say why. Information Obtained from:    [x] Chart Review   [x] Patient   [] Family/Caregiver   [] Nurse/Physician   [] Interdisciplinary Meeting/Rounds    Dx: Osteoarthritis  Diet: Consistent Carbohydrate  Medications: [x] Reviewed    Allergies: [x] Reviewed   No diagnosis found.   Past Medical History:   Diagnosis Date    Allergic rhinitis     Anxiety disorder     Bronchial asthma     Cataract of left eye     Chronic pain syndrome     Decreased calculated glomerular filtration rate (GFR) 2/6/2017    Calculated GFR is equivalent to that of CKD stage 2 = 60-89 ml/min    Depression     Dyslipidemia     Essential hypertension     Gout     History of tobacco abuse     Lichen simplex chronicus     Obesity, Class III, BMI 40-49.9 (morbid obesity) ([facility] [address]     Paronychia     Primary osteoarthritis of left hip     Type 2 diabetes mellitus without complication (HCC)       Labs:  Lab Results   Component Value Date/Time    Sodium 139 07/24/2017 10:19 AM    Potassium 4.5 07/24/2017 10:19 AM    Chloride 106 07/24/2017 10:19 AM    CO2 25 07/24/2017 10:19 AM    Anion gap 8 07/24/2017 10:19 AM    Glucose 163 07/24/2017 10:19 AM    BUN 17 07/24/2017 10:19 AM    Creatinine 1.12 07/24/2017 10:19 AM    Calcium 9.4 07/24/2017 10:19 AM    Magnesium 2.0 02/09/2017 06:15 AM    Albumin 2.9 02/08/2017 04:25 AM     Anthropometrics: BMI (calculated): 38.8  Last 3 Recorded Weights in this Encounter    07/19/17 1146 07/24/17 0639   Weight: 90.7 kg (200 lb) 99.3 kg (219 lb)    Ht Readings from Last 1 Encounters:   07/19/17 5' 3\" (1.6 m)     Patient Vitals for the past 100 hrs:   % Diet Eaten   07/24/17 1834 50 %       IBW: 115 lb %IBW: 190% UBW: 230 lb   [x] Weight Loss [] Weight Gain [] Weight Stable    Estimated Nutrition Needs: [x] MSJ  [] Other:  Calories: 2200 kcal Based on:   [x] Actual BW    Protein:   100 g Based on:   [x] Actual BW    Fluid:       2200 ml Based on:   [x] Actual BW      [x] No Cultural, Hoahaoism or ethnic dietary need identified.     [] Cultural, Hoahaoism and ethnic food preferences identified and addressed     Wt Status:  [] Normal (18.6 - 24.9) [] Underweight (<18.5) [] Overweight (25 - 29.9) [] Mild Obesity (30 - 34.9)  [x] Moderate Obesity (35 - 39.9) [] Morbid Obesity (40+)   [] Moderate Malnutrition [] Severe Malnutrition in the context of :     Nutrition Problems Identified:   [] Suboptimal PO intake   [] Food Allergies  [] Difficulty chewing/swallowing/poor dentition  [] Constipation/Diarrhea   [] Nausea/Vomiting   [] None  [x] Other: DM  Plan:   [x] Therapeutic Diet  []  Obtained/adjusted food preferences/tolerances and/or snacks options   [x]  Supplements added   [] Occupational therapy following for feeding techniques  []  HS snack added   []  Modify diet texture   []  Modify diet for food allergies   []  Educate patient   []  Assist with menu selection   [x]  Monitor PO intake on meal rounds   [x]  Continue inpatient monitoring and intervention   []  Participated in discharge planning/Interdisciplinary rounds/Team meetings   []  Other:     Education Needs:   [] Not appropriate for teaching at this time due to:   [x] Identified and addressed    Nutrition Monitoring and Evaluation:  [x] Continue ongoing monitoring and intervention  [] Kushal Dorsey Cancel  Pager: [phone]

## 2017-07-25 NOTE — ROUTINE PROCESS
Assumed care of pt report received from Sarabjit OSORIO RN. Pt awake, alert and oriented X 3. R knee dressing intact. No verbal distress noted. Bed in lowest position & wheels locked. Call bell within reach. 2045 - complaints pf Right leg pain, administered Rixicodone 1 tab prn.   2135 - Pt verbalized some relief of pain, no distress noted. 2330 - Pt resting quietly, call bell within reach.  7/26/17  0300 - Patient sleeping, no signs of distress. 0715 - Bedside and Verbal shift change report given to Sarabjit STACY (oncoming nurse) by Carlton Miller RN BSN (offgoing nurse). Report given with SBAR, Kardex, Intake/Output, MAR and Recent Results.

## 2017-07-25 NOTE — PROGRESS NOTES
Pt resting in bed. Pt states she has been having worsening pain today. Pain peds were changed this morning by PA. Encouraged pt to alternate pain meds to help with pain control. Per PA notes, plans for dc home tomorrow. Pt states she is not ready for dc. States she does not have help at home and needs a few more days in the hospital. Informed pt that she will not be able to stay in hospital without a medical reason. Spoke to Shirley Osorio regarding [facility] [employer]. States she has submitted paperwork for personal care. Should have information about approval or disapproval tomorrow. If pt is not eligible for personal care aide, plan remains dc home with Home Health. Shirley Osorio to inform friend, Amirah Leyvaelin of dc plan. Pt informed of this at this time.

## 2017-07-25 NOTE — PROGRESS NOTES
Internal Medicine Progress Note    Patient's Name: Cordell Tan Date: 7/24/2017  Length of Stay: 1      Assessment/Plan     Active Hospital Problems    Diagnosis Date Noted    Osteoarthritis of right knee 07/24/2017    Gout     History of tobacco abuse     Essential hypertension     Bronchial asthma     Dyslipidemia     Type 2 diabetes mellitus without complication (HCC)      - PT/OT  - Pain control PRN  - Bowel regimen  - BP in good control  - Cont acceptable home medications for chronic conditions   - DVT protocol    I have personally reviewed all pertinent labs and films that have officially resulted over the last 24 hours. I have personally checked for all pending labs that are awaiting final results. Subjective     Pt s/e @ bedside  No major events overnight  C/o lots of pain today in knee  Denies CP or SOB    Objective     Visit Vitals    /80 (BP 1 Location: Left arm, BP Patient Position: At rest)    Pulse 84    Temp 98.2 °F (36.8 °C)    Resp 18    Ht 5' 3\" (1.6 m)    Wt 99.3 kg (219 lb)    SpO2 92%    BMI 38.79 kg/m2       Physical Exam:  General Appearance: NAD, conversant  Lungs: CTA with normal respiratory effort  CV: RRR, no m/r/g  Abdomen: soft, non-tender, normal bowel sounds  Extremities: no cyanosis, no peripheral edema, dressing in place R knee  Neuro: No focal deficits, motor/sensory intact    Lab/Data Reviewed:  BMP: No results found for: NA, K, CL, CO2, AGAP, GLU, BUN, CREA, GFRAA, GFRNA  CBC:   Lab Results   Component Value Date/Time    HGB 10.1 (L) 07/25/2017 05:49 AM    HCT 30.8 (L) 07/25/2017 05:49 AM       Imaging Reviewed:  No results found.     Medications Reviewed:  Current Facility-Administered Medications   Medication Dose Route Frequency    oxyCODONE IR (ROXICODONE) tablet 10-15 mg  10-15 mg Oral Q4H PRN    acetaminophen (TYLENOL) tablet 650 mg  650 mg Oral Q6H    polyethylene glycol (MIRALAX) packet 17 g  17 g Oral DAILY    celecoxib (CELEBREX) capsule 100 mg  100 mg Oral BID    allopurinol (ZYLOPRIM) tablet 300 mg  300 mg Oral DAILY    amLODIPine (NORVASC) tablet 5 mg  5 mg Oral DAILY    FLUoxetine (PROzac) capsule 40 mg  40 mg Oral DAILY    multivitamin, tx-iron-ca-min (THERA-M w/ IRON) tablet 1 Tab  1 Tab Oral DAILY    nitroglycerin (NITROSTAT) tablet 0.4 mg  0.4 mg SubLINGual Q5MIN PRN    QUEtiapine (SEROquel) tablet 50 mg  50 mg Oral QHS    famotidine (PEPCID) tablet 20 mg  20 mg Oral BID    simvastatin (ZOCOR) tablet 20 mg  20 mg Oral QHS    theophylline ER(12 hour) (THEOCHRON) tablet 300 mg (Patient's Own Medication)  300 mg Oral BID    sodium chloride (NS) flush 5-10 mL  5-10 mL IntraVENous Q8H    sodium chloride (NS) flush 5-10 mL  5-10 mL IntraVENous PRN    naloxone (NARCAN) injection 0.4 mg  0.4 mg IntraVENous PRN    ondansetron (ZOFRAN) injection 4 mg  4 mg IntraVENous Q4H PRN    diphenhydrAMINE (BENADRYL) injection 12.5 mg  12.5 mg IntraVENous Q4H PRN    diphenhydrAMINE (BENADRYL) capsule 25 mg  25 mg Oral Q4H PRN    docusate sodium (COLACE) capsule 100 mg  100 mg Oral BID    enoxaparin (LOVENOX) injection 40 mg  40 mg SubCUTAneous Q24H    morphine injection 2 mg  2 mg IntraVENous Q2H PRN    albuterol (PROVENTIL VENTOLIN) nebulizer solution 2.5 mg  2.5 mg Nebulization Q4H PRN    insulin lispro (HUMALOG) injection   SubCUTAneous AC&HS    glucose chewable tablet 16 g  4 Tab Oral PRN    glucagon (GLUCAGEN) injection 1 mg  1 mg IntraMUSCular PRN    dextrose (D50W) injection syrg 12.5-25 g  25-50 mL IntraVENous PRN    arformoterol (BROVANA) neb solution 15 mcg  15 mcg Nebulization BID RT    budesonide (PULMICORT) 500 mcg/2 ml nebulizer suspension  500 mcg Nebulization BID RT           Zulema Hawley DO  Internal Medicine, Hospitalist  Pager: [phone]  [facility]

## 2017-07-25 NOTE — PROGRESS NOTES
Problem: Self Care Deficits Care Plan (Adult)  Goal: *Acute Goals and Plan of Care (Insert Text)  Occupational Therapy Goals  Initiated 7/25/2017 within 7 day(s). 1. Patient will perform grooming tasks at EOB with modified independence. 2. Patient will perform lower body dressing with modified independence utilizing AE, prn.  3. Patient will perform functional task in standing for 3 minutes with minimal assistance to increase activity tolerance for ADLs. 4. Patient will perform toilet transfers with minimal assistance/contact guard assist.  5. Patient will perform all aspects of toileting with minimal assistance/contact guard assist.  6. Patient will participate in upper extremity therapeutic exercise/activities with supervision/set-up for 8 minutes to increase BUE strength for ADLs. 7. Patient will utilize energy conservation techniques during functional activities with minimal verbal cues. Outcome: Progressing Towards Goal  OCCUPATIONAL THERAPY EVALUATION     Patient: Tressa Stiles (18 y.o. female)  Date: 7/25/2017  Primary Diagnosis: osteoarthritis m17.11  Osteoarthritis of right knee  Procedure(s) (LRB):  right total knee replacement (Right) 1 Day Post-Op   Precautions:  Fall, WBAT      ASSESSMENT :  Based on the objective data described below, the patient presents with impairments with regard to bed mobility in prep for ADLs, activity tolerance, and independence in ADLs secondary R TKR. Pt is WBAT. Pt supine on arrival with breakfast tray present; c/o 7/10 pain in R knee. Pt on bedpan on arrival; min A for pericare. Min A x2 to maneuver to EOB from supine. Supervision/set-up for grooming tasks at EOB (secondary to decreased standing tolerance). Mod/max A x2 to stand from EOB with vc's for BUE positioning; needs reinforcement. Pt engaged in static standing activity for approx 30 seconds in prep for [facility] transfer for toileting.  Pt unable to tolerate side steps towards [facility]; not safe to transfer to recliner chair at this time. Utilizing BUEs/BLEs, pt scooted up to [facility] with min A x2. Pt returned supine; towel roll under R ankle; pillow placed on lateral aspect of R knee to position RLE in more functional position. Pt lives with cousin; assists cousin with higher level ADLs; 3 steps in; has tub/shower combination. Recommending rehab upon d/c. Pt left supine with needs within reach; c/o 9/10 pain. Ivana Pineda RN aware. Patient will benefit from skilled intervention to address the above impairments. Patients rehabilitation potential is considered to be Good  Factors which may influence rehabilitation potential include:   [ ]             None noted  [ ]             Mental ability/status  [X]             Medical condition  [ ]             Home/family situation and support systems  [ ]             Safety awareness  [ ]             Pain tolerance/management  [ ]             Other:      Recommendations for nursing: up with assist x2 to [facility]  Written on communication board: yes  Verbally communicated to: Ivana Pineda RN          PLAN :  Recommendations and Planned Interventions:  [X]               Self Care Training                  [X]        Therapeutic Activities  [X]               Functional Mobility Training    [ ]        Cognitive Retraining  [X]               Therapeutic Exercises           [X]        Endurance Activities  [X]               Balance Training                   [ ]        Neuromuscular Re-Education  [ ]               Visual/Perceptual Training     [X]   Home Safety Training  [X]               Patient Education                 [X]        Family Training/Education  [ ]               Other (comment):     Frequency/Duration: Patient will be followed by occupational therapy 4-7 times a week to address goals. Discharge Recommendations: Zoltan Aguilar  Further Equipment Recommendations for Discharge: bedside commode       SUBJECTIVE:   Patient stated I'm not going to rehab. \"      OBJECTIVE DATA SUMMARY:       Past Medical History:   Diagnosis Date    Allergic rhinitis      Anxiety disorder      Bronchial asthma      Cataract of left eye      Chronic pain syndrome      Decreased calculated glomerular filtration rate (GFR) 2/6/2017     Calculated GFR is equivalent to that of CKD stage 2 = 60-89 ml/min    Depression      Dyslipidemia      Essential hypertension      Gout      History of tobacco abuse      Lichen simplex chronicus      Obesity, Class III, BMI 40-49.9 (morbid obesity) ([facility] [address]      Paronychia      Primary osteoarthritis of left hip      Type 2 diabetes mellitus without complication ([facility] [address]       Past Surgical History:   Procedure Laterality Date    HX HERNIA REPAIR        HX HIP REPLACEMENT Left 02/06/2017     S/P Left total hip replacement (2/6/2017 - Dr. Lucille Freeman)    HX HYSTERECTOMY        HX WRIST FRACTURE TX Right      HX WRIST FRACTURE TX Left       Barriers to Learning/Limitations: None  Compensate with: visual, verbal, tactile, kinesthetic cues/model     GCODES:  Self Care  Current  CL= 60-79%   Goal  CJ= 20-39%. The severity rating is based on the Other Functional Assessment, MMT, ROM     Eval Complexity: History: MEDIUM Complexity : Expanded review of history including physical, cognitive and psychosocial  history ; Examination: MEDIUM Complexity : 3-5 performance deficits relating to physical, cognitive , or psychosocial skils that result in activity limitations and / or participation restrictions; Decision Making:MEDIUM Complexity : Patient may present with comorbidities that affect occupational performnce.  Miniml to moderate modification of tasks or assistance (eg, physical or verbal ) with assesment(s) is necessary to enable patient to complete evaluation      Prior Level of Function/Home Situation:   Home Situation  Home Environment: Private residence  # Steps to Enter: 3  Rails to Enter: Yes  Hand Rails : Bilateral  One/Two Story Residence: [facility] story  Living Alone: No  Support Systems: Family member(s)  Patient Expects to be Discharged to[de-identified] Private residence  Current DME Used/Available at Home: Shower chair, Wheelchair, Walker, rolling, Cane, straight  Tub or Shower Type: Tub/Shower combination (has shower chair; no grab bars)  [X]  Right hand dominant          [ ]  Left hand dominant     Cognitive/Behavioral Status:  Neurologic State: Alert  Orientation Level: Oriented X4  Cognition: Appropriate decision making; Appropriate for age attention/concentration; Appropriate safety awareness; Follows commands  Safety/Judgement: Fall prevention; Awareness of environment      Skin: Intact (BUEs)  Edema: None noted (BUEs)     Vision/Perceptual:    Acuity: Able to read clock/calendar on wall without difficulty       Coordination:  Coordination: Within functional limits (BUEs)  Fine Motor Skills-Upper: Right Intact; Left Intact    Gross Motor Skills-Upper: Right Intact; Left Intact      Balance:  Sitting: Impaired  Sitting - Static: Good (unsupported)  Sitting - Dynamic: Fair (occasional)  Standing: Impaired; With support  Standing - Static: Fair (Fair-)  Standing - Dynamic : Poor     Strength:  Strength: Generally decreased, functional (BUEs: 4/5)     Tone & Sensation:  Tone: Normal (BUEs)  Sensation: Intact (BUEs)     Range of Motion:  AROM: Within functional limits (BUEs: full shoulder/elbow flexion)     Functional Mobility and Transfers for ADLs:  Bed Mobility:  Rolling: Stand-by asssistance  Supine to Sit: Minimum assistance;Assist x2  Sit to Supine: Minimum assistance;Assist x2  Scooting: Minimum assistance; Moderate assistance;Assist x2      Transfers:  Sit to Stand: Moderate assistance;Maximum assistance; Additional time;Assist x2              Toilet Transfer :  (not assessed)                ADL Assessment:  Feeding: Setup;Supervision  Oral Facial Hygiene/Grooming: Setup;Supervision  Bathing: Maximum assistance  Upper Body Dressing: Minimum assistance  Lower Body Dressing: Maximum assistance  Toileting: Maximum assistance     ADL Intervention:  Grooming  Washing Face: Supervision/set-up  Brushing Teeth: Supervision/set-up (sitting at EOB)  Cues: Verbal cues provided     Upper Body Dressing Assistance  Dressing Assistance: Minimum assistance  Hospital Gown: Minimum  assistance  Cues: Verbal cues provided;Physical assistance      Toileting  Bladder Hygiene: Minimum assistance (after utilizing bedpan)  Clothing Management: Minimum assistance  Cues: Verbal cues provided      Supervision/set-up for grooming tasks at EOB (secondary to decreased standing tolerance). Pt utilized bed pan while supine; min A for pericare while side lying; min A for clothing management. At EOB, min A for UB dressing with supervision for balance. Cognitive Retraining  Safety/Judgement: Fall prevention; Awareness of environment     Therapeutic Activity:  Pt engaged in static standing activity for approx 30 seconds in prep for [facility] transfer for toileting. Pt performed bed mobility in prep for EOB activity. Pt engaged in dynamic sitting task at EOB in prep for LB dressing with supervision for balance and vc's for proper positioning to ensure safety. Pain:  Pre treatment pain level: 7/10  Post treatment pain level: 9/10 (Mikey Contreras, RN notified)  Pain Scale 1: Numeric (0 - 10)  Pain Location 1: Knee  Pain Orientation 1: Posterior  Pain Description 1: Aching  Pain Intervention(s) 1: Medication (see MAR)      Activity Tolerance:  Fair  Please refer to the flowsheet for vital signs taken during this treatment. After treatment:   [ ] Patient left in no apparent distress sitting up in chair  [X] Patient left in no apparent distress in bed  [X] Call bell left within reach  [X] Nursing notified  [ ] Caregiver present  [ ] Bed alarm activated      COMMUNICATION/EDUCATION: Pt educated on role of OT, POC, and home safety.  She verbalized understanding.   [X] Home safety education was provided and the patient/caregiver indicated understanding. [X] Patient/family have participated as able in goal setting and plan of care. [X] Patient/family agree to work toward stated goals and plan of care. [ ] Patient understands intent and goals of therapy, but is neutral about his/her participation. [ ] Patient is unable to participate in goal setting and plan of care.      Thank you for this referral.     Dunia Pierce MS OTR/L  Time Calculation: 34 mins

## 2017-07-25 NOTE — PROGRESS NOTES
0725 Received bedside report from [facility], RN, updated white board, call bell is within reach. 0020 Diet order changed to diabetic diet. 9221 Patient is working with OT at this time. She complains of pain in her knee at this time. Will give pain medication to help with the discomfort. 0945 Dressing changed at this time due to the patient complaining of the bandage being too tight. Dressing changed to 4 X 4 and ace wrap. Applied the breann hose back at this time. Patient is in a great deal of pain will give IV medication at this time. Call bell is within reach. 1130 Patient states her pain has decreased now that she has been able to reposition it and the dressing was change. Call bell is within reach. 1400 Patient is resting in the bed at this time. Call bell is within reach. 65 Family members are at the bedside at this time. Call bell is within reach. Bedside and Verbal shift change report given to Werner Beach RN (oncoming nurse) by Rocio Wei RN (offgoing nurse). Report included the following information SBAR, Kardex and MAR.

## 2017-07-25 NOTE — PROGRESS NOTES
Problem: Mobility Impaired (Adult and Pediatric)  Goal: *Acute Goals and Plan of Care (Insert Text)  PHYSICAL THERAPY SHORT TERM GOALS : All goals to be met in 7 days with R TKR WBAT precautions  1. Patient will perform/complete bedside mobility supine <-> sit with modified independence . 2. Patient will perform/complete bedside transfers EOB <-> chair with modified independence . 3. Patient will perform/complete bedside transfer sit <-> stand with modified independence. 4. Patient will perform/complete gait training 100 with the least restrictive device with modified independence . 5. Patient will perform /complete stair training 3 steps , up <-> down with the least restrictive device modified independence . 6. Patient will participate in lower extremity therapeutic exercise/activities with modified independence for 15 minutes . Therapist Malaika Parra 7/24/2017  Time Calculation: 26 mins   Outcome: Progressing Towards Goal  PHYSICAL THERAPY TREATMENT     Patient: Rashel Parra (89 y.o. female)  Date: 7/25/2017  Diagnosis: osteoarthritis m17.11  Osteoarthritis of right knee Osteoarthritis of right knee  Procedure(s) (LRB):  right total knee replacement (Right) 1 Day Post-Op  Precautions: Fall, WBAT   Chart, physical therapy assessment, plan of care and goals were reviewed. ASSESSMENT:  3 attempts to treat pt this a.m. Pt c/o a lot of pain and asking for pain meds, nurse has provided all she can to the pt for now. Pt refusing OOB activity at this time. Bed mobility and therapeutic exercises performed. Pt able to pull self up to [facility] using single leg bridge and use of bed rails.      Education: keep RLE in neutral positions and knee extended, ice for pain and swelling, ROM HEP  Progression toward goals:  [ ]      Improving appropriately and progressing toward goals  [X]      Improving slowly and progressing toward goals  [ ]      Not making progress toward goals and plan of care will be adjusted       PLAN:  Patient continues to benefit from skilled intervention to address the above impairments. Continue treatment per established plan of care. Discharge Recommendations:  [address], TBD  Further Equipment Recommendations for Discharge:  rolling walker       SUBJECTIVE:   Patient stated I am hurting.       OBJECTIVE DATA SUMMARY:   Critical Behavior:  Neurologic State: Alert  Orientation Level: Oriented X4  Cognition: Appropriate decision making, Appropriate for age attention/concentration, Appropriate safety awareness, Follows commands  Safety/Judgement: Fall prevention, Awareness of environment  Functional Mobility Training:  Bed Mobility:  Rolling: Stand-by asssistance  Supine to Sit: Minimum assistance;Assist x2  Sit to Supine: Minimum assistance;Assist x2  Scooting: Modified independent  Transfers:  Sit to Stand: Moderate assistance;Maximum assistance; Additional time;Assist x2  Stand to Sit: Moderate assistance;Assist x2  Balance:  Sitting: Impaired  Sitting - Static: Good (unsupported)  Sitting - Dynamic: Fair (occasional)  Standing: Impaired; With support  Standing - Static: Fair (Fair-)  Standing - Dynamic : Poor  Therapeutic Exercises:   AP, QS, neutral alignment of RLE  Pain:  Pre:9  Post:9  Pain Scale 1: Numeric (0 - 10)     Pain Location 1: Knee  Pain Orientation 1: Posterior  Pain Description 1: Aching;Constant  Pain Intervention(s) 1: Medication (see MAR)  Activity Tolerance:   Poor due to pain  Please refer to the flowsheet for vital signs taken during this treatment.   After treatment:   [ ] Patient left in no apparent distress sitting up in chair  [X] Patient left in no apparent distress in bed  [X] Call bell left within reach  [X] Nursing notified  [ ] Caregiver present  [ ] Bed alarm activated      José Miguel Quevedo PTA   Time Calculation: 27 mins

## 2017-07-25 NOTE — PROGRESS NOTES
Problem: Mobility Impaired (Adult and Pediatric)  Goal: *Acute Goals and Plan of Care (Insert Text)  PHYSICAL THERAPY SHORT TERM GOALS : All goals to be met in 7 days with R TKR WBAT precautions  1. Patient will perform/complete bedside mobility supine <-> sit with modified independence . 2. Patient will perform/complete bedside transfers EOB <-> chair with modified independence . 3. Patient will perform/complete bedside transfer sit <-> stand with modified independence. 4. Patient will perform/complete gait training 100 with the least restrictive device with modified independence . 5. Patient will perform /complete stair training 3 steps , up <-> down with the least restrictive device modified independence . 6. Patient will participate in lower extremity therapeutic exercise/activities with modified independence for 15 minutes . Therapist Zaida Elizabeth 7/24/2017  Time Calculation: 26 mins   Outcome: Progressing Towards Goal  PHYSICAL THERAPY TREATMENT     Patient: Teresita Tejeda (54 y.o. female)  Date: 7/25/2017  Diagnosis: osteoarthritis m17.11  Osteoarthritis of right knee Osteoarthritis of right knee  Procedure(s) (LRB):  right total knee replacement (Right) 1 Day Post-Op  Precautions: Fall, WBAT   Chart, physical therapy assessment, plan of care and goals were reviewed. ASSESSMENT:  Pt sitting on BSC upon entering room. Pt stood and ambulated 2 ft with multiple short steps back to bed. Pt refusing to participate any further, 2 visitors arrived during session. Mary Grace with LEs, pt able to scoot up to [facility] without assistance. Education:Reviewed PT goals with pt and stressed the need for more participation.   Progression toward goals:  [ ]      Improving appropriately and progressing toward goals  [X]      Improving slowly and progressing toward goals  [ ]      Not making progress toward goals and plan of care will be adjusted       PLAN:  Patient continues to benefit from skilled intervention to address the above impairments. Continue treatment per established plan of care. Discharge Recommendations:  Zoltan Aguilar vs Yaya Parker  Further Equipment Recommendations for Discharge:  rolling walker       SUBJECTIVE:   Patient stated I can't do anymore, it feels like someone cut my leg off.       OBJECTIVE DATA SUMMARY:   Critical Behavior:  Neurologic State: Alert  Orientation Level: Oriented X4  Cognition: Appropriate decision making, Appropriate for age attention/concentration, Appropriate safety awareness, Follows commands  Safety/Judgement: Fall prevention, Awareness of environment  Functional Mobility Training:  Bed Mobility:  Rolling: Stand-by asssistance  Supine to Sit: Minimum assistance;Assist x2  Sit to Supine: Minimum assistance  Scooting: Modified independent  Transfers:  Sit to Stand: Contact guard assistance  Stand to Sit: Minimum assistance  Bed to Chair: Minimum assistance  Balance:  Sitting: Intact; With support  Sitting - Static: Good (unsupported)  Sitting - Dynamic: Fair (occasional)  Standing: Impaired; With support  Standing - Static: Fair  Standing - Dynamic : Fair (-)  Ambulation/Gait Training:  Distance (ft): 2 Feet (ft)  Assistive Device: Gait belt;Walker, rolling  Ambulation - Level of Assistance: Minimal assistance  Gait Abnormalities: Antalgic;Decreased step clearance  Therapeutic Exercises:   HEP provided  Pain:  Pre:8  Post:8  Pain Scale 1: Numeric (0 - 10)     Activity Tolerance:   Poor  Please refer to the flowsheet for vital signs taken during this treatment.   After treatment:   [ ] Patient left in no apparent distress sitting up in chair  [X] Patient left in no apparent distress in bed  [X] Call bell left within reach  [ ] Nursing notified  [ ] Caregiver present  [ ] Bed alarm activated      Isra Hdez PTA   Time Calculation: 13 mins

## 2017-07-25 NOTE — PROGRESS NOTES
Progress Note      Post Operative Day: 1    Assessment:    1. Status post  TOTAL KNEE ARTHROPLASTY [98935] (KNEE ARTHROPLASTY TOTAL) for osteoarthritis m17.11  Osteoarthritis of right knee 7/24/2017,  Progressing. PLAN:    1. Mobilize. Continue P.T.  2. WBAT  3. Lovenox for DVT prophylaxis  4. Discharge Planning; ? Tomorrow. D/C held today due to pain control           HPI: Torrey Hernandez is a 58 y.o. female patient without new complaints status post right TKA  7/24/2017. No new orthopaedic changes. Patient c/o of poor pain control but pain medications \"are working\"; was able to stand today with OT. Blood pressure 140/80, pulse 84, temperature 98.2 °F (36.8 °C), resp. rate 18, height 5' 3\" (1.6 m), weight 99.3 kg (219 lb), SpO2 92 %. CBC w/Diff   Lab Results   Component Value Date/Time    WBC 16.1 (H) 02/09/2017 06:15 AM    RBC 3.21 (L) 02/09/2017 06:15 AM    HGB 10.1 (L) 07/25/2017 05:49 AM    HCT 30.8 (L) 07/25/2017 05:49 AM    MCV 92.2 02/09/2017 06:15 AM    MCH 30.8 02/09/2017 06:15 AM    MCHC 33.4 02/09/2017 06:15 AM    RDW 14.4 02/09/2017 06:15 AM    Lab Results   Component Value Date/Time    MONOS 7 02/09/2017 06:15 AM    EOS 4 02/09/2017 06:15 AM    BASOS 0 02/09/2017 06:15 AM    RDW 14.4 02/09/2017 06:15 AM             Physical Assessment:  General: in no apparent distress, well developed and well nourished, non-toxic, in no respiratory distress and acyanotic, alert and oriented times 3   Wound: clean, dry, no drainage   Extremities:  Neurovascular intact RLE. Compartments soft and NT distal to surgical  site. Able to contract quadriceps. Plantar and dorsiflexion intact. Palpable DP/PT pulses noted. Denies paresthesias    Dressing:  CDI   DVT Exam:   No exam evidence to suggest DVT. Compartments soft and NT. Radiology:   RIGHT KNEE  Portable right Knee      History:  Post-op evaluation     Technique: AP and lateral views     Findings:   There is a total knee prosthesis with the prosthetic components in proper  position and alignment of the two views obtained. Postsurgical soft tissue  edematous changes are present with expected operative subcutaneous air.     IMPRESSION  Impression:     1. Status post total right knee arthroplasty, satisfactory alignment and  expected postoperative changes.          Naren Diaz PA-C  7/25/2017    Office [phone]

## 2017-07-25 NOTE — PROGRESS NOTES
Patient has designated her friend to participate in his/her discharge plan and to receive any needed information.      Name:   Glorianne Jeans   ODTLKUNAL   [phone]        Address:  Phone number:

## 2017-07-25 NOTE — PROGRESS NOTES
Pt refused therapy. States she is doing fine and doesn't want the medication that is ordered. Will continue to follow.

## 2017-07-26 ENCOUNTER — HOME HEALTH ADMISSION (OUTPATIENT)
Dept: HOME HEALTH SERVICES | Facility: HOME HEALTH | Age: 62
End: 2017-07-26
Payer: MEDICAID

## 2017-07-26 VITALS
HEART RATE: 96 BPM | HEIGHT: 63 IN | DIASTOLIC BLOOD PRESSURE: 71 MMHG | OXYGEN SATURATION: 94 % | WEIGHT: 219 LBS | SYSTOLIC BLOOD PRESSURE: 130 MMHG | TEMPERATURE: 98 F | RESPIRATION RATE: 18 BRPM | BODY MASS INDEX: 38.8 KG/M2

## 2017-07-26 LAB
ANION GAP BLD CALC-SCNC: 8 MMOL/L (ref 3–18)
BUN SERPL-MCNC: 14 MG/DL (ref 7–18)
BUN/CREAT SERPL: 18 (ref 12–20)
CALCIUM SERPL-MCNC: 8.7 MG/DL (ref 8.5–10.1)
CHLORIDE SERPL-SCNC: 107 MMOL/L (ref 100–108)
CO2 SERPL-SCNC: 24 MMOL/L (ref 21–32)
CREAT SERPL-MCNC: 0.77 MG/DL (ref 0.6–1.3)
GLUCOSE BLD STRIP.AUTO-MCNC: 128 MG/DL (ref 70–110)
GLUCOSE BLD STRIP.AUTO-MCNC: 196 MG/DL (ref 70–110)
GLUCOSE SERPL-MCNC: 122 MG/DL (ref 74–99)
HCT VFR BLD AUTO: 28.1 % (ref 35–45)
HGB BLD-MCNC: 9.1 G/DL (ref 12–16)
POTASSIUM SERPL-SCNC: 4 MMOL/L (ref 3.5–5.5)
SODIUM SERPL-SCNC: 139 MMOL/L (ref 136–145)

## 2017-07-26 PROCEDURE — 97530 THERAPEUTIC ACTIVITIES: CPT

## 2017-07-26 PROCEDURE — 36415 COLL VENOUS BLD VENIPUNCTURE: CPT | Performed by: ORTHOPAEDIC SURGERY

## 2017-07-26 PROCEDURE — 97110 THERAPEUTIC EXERCISES: CPT

## 2017-07-26 PROCEDURE — 94640 AIRWAY INHALATION TREATMENT: CPT

## 2017-07-26 PROCEDURE — 74011000250 HC RX REV CODE- 250: Performed by: NURSE PRACTITIONER

## 2017-07-26 PROCEDURE — 74011250637 HC RX REV CODE- 250/637: Performed by: PHYSICIAN ASSISTANT

## 2017-07-26 PROCEDURE — 74011250637 HC RX REV CODE- 250/637: Performed by: ORTHOPAEDIC SURGERY

## 2017-07-26 PROCEDURE — 85018 HEMOGLOBIN: CPT | Performed by: ORTHOPAEDIC SURGERY

## 2017-07-26 PROCEDURE — 80048 BASIC METABOLIC PNL TOTAL CA: CPT | Performed by: ORTHOPAEDIC SURGERY

## 2017-07-26 PROCEDURE — 74011636637 HC RX REV CODE- 636/637: Performed by: NURSE PRACTITIONER

## 2017-07-26 PROCEDURE — 97116 GAIT TRAINING THERAPY: CPT

## 2017-07-26 PROCEDURE — 82962 GLUCOSE BLOOD TEST: CPT

## 2017-07-26 RX ORDER — OXYCODONE AND ACETAMINOPHEN 7.5; 325 MG/1; MG/1
1-2 TABLET ORAL
Qty: 60 TAB | Refills: 0 | Status: SHIPPED | OUTPATIENT
Start: 2017-07-26

## 2017-07-26 RX ORDER — ENOXAPARIN SODIUM 100 MG/ML
40 INJECTION SUBCUTANEOUS EVERY 24 HOURS
Qty: 5.6 ML | Refills: 0 | Status: SHIPPED
Start: 2017-07-26 | End: 2017-08-09

## 2017-07-26 RX ORDER — NALOXONE HYDROCHLORIDE 4 MG/.1ML
SPRAY NASAL
Qty: 1 PACKAGE | Refills: 0 | Status: SHIPPED | OUTPATIENT
Start: 2017-07-26

## 2017-07-26 RX ORDER — POLYETHYLENE GLYCOL 3350 17 G/17G
17 POWDER, FOR SOLUTION ORAL DAILY
Qty: 12 PACKET | Refills: 0 | Status: SHIPPED | OUTPATIENT
Start: 2017-07-26 | End: 2017-08-07

## 2017-07-26 RX ADMIN — FAMOTIDINE 20 MG: 20 TABLET ORAL at 08:40

## 2017-07-26 RX ADMIN — ALLOPURINOL 300 MG: 300 TABLET ORAL at 08:39

## 2017-07-26 RX ADMIN — ACETAMINOPHEN 650 MG: 325 TABLET, FILM COATED ORAL at 13:02

## 2017-07-26 RX ADMIN — CELECOXIB 100 MG: 100 CAPSULE ORAL at 08:40

## 2017-07-26 RX ADMIN — POLYETHYLENE GLYCOL 3350 17 G: 17 POWDER, FOR SOLUTION ORAL at 08:39

## 2017-07-26 RX ADMIN — ARFORMOTEROL TARTRATE 15 MCG: 15 SOLUTION RESPIRATORY (INHALATION) at 07:13

## 2017-07-26 RX ADMIN — FLUOXETINE 40 MG: 20 CAPSULE ORAL at 08:39

## 2017-07-26 RX ADMIN — OXYCODONE HYDROCHLORIDE 15 MG: 5 TABLET ORAL at 11:20

## 2017-07-26 RX ADMIN — BUDESONIDE 500 MCG: 0.5 INHALANT RESPIRATORY (INHALATION) at 07:13

## 2017-07-26 RX ADMIN — OXYCODONE HYDROCHLORIDE 15 MG: 5 TABLET ORAL at 07:56

## 2017-07-26 RX ADMIN — ACETAMINOPHEN 650 MG: 325 TABLET, FILM COATED ORAL at 06:15

## 2017-07-26 RX ADMIN — THEOPHYLLINE 300 MG: 300 TABLET, EXTENDED RELEASE ORAL at 08:40

## 2017-07-26 RX ADMIN — INSULIN LISPRO 2 UNITS: 100 INJECTION, SOLUTION INTRAVENOUS; SUBCUTANEOUS at 12:59

## 2017-07-26 RX ADMIN — AMLODIPINE BESYLATE 5 MG: 5 TABLET ORAL at 08:41

## 2017-07-26 RX ADMIN — MULTIPLE VITAMINS W/ MINERALS TAB 1 TABLET: TAB at 08:40

## 2017-07-26 RX ADMIN — DOCUSATE SODIUM 100 MG: 100 CAPSULE, LIQUID FILLED ORAL at 08:39

## 2017-07-26 NOTE — DISCHARGE SUMMARY
Discharge Summary    Admit Date: 2017  Discharge Date:  2017     Patient ID:   Name:  Mendy Brumfield      Age:  58 y.o.    :  1955    Admitting Diagnosis: osteoarthritis m17.11  Osteoarthritis of right knee     Post Operative Day: 2    Operative Procedures: Right total knee arthroplasty      Isolation Precautions:   Not required. Patient is not currently contagious. Physical Exam on Discharge:  Visit Vitals    /76 (BP 1 Location: Right arm, BP Patient Position: At rest)    Pulse 86    Temp 97.9 °F (36.6 °C)    Resp 20    Ht 5' 3\" (1.6 m)    Wt 99.3 kg (219 lb)    SpO2 93%    BMI 38.79 kg/m2         General: in no apparent distress, well developed and well nourished, non-toxic, in no respiratory distress and acyanotic, alert and oriented times 3   Wound: clean, dry   Extremities:  Neurovascular intact RLE. Compartments soft and NT distal to surgical  site. Able to contract quadriceps. Plantar and dorsiflexion intact. Palpable DP/PT pulses noted. Denies paresthesias    Dressing:  Dry and intact, scant drainage   DVT Exam:   No evidence of DVT seen on physical exam; compartments soft and NT.          Relevant labs within last 72 hours:    CBC w/Diff    Lab Results   Component Value Date/Time    WBC 16.1 (H) 2017 06:15 AM    RBC 3.21 (L) 2017 06:15 AM    HGB 9.1 (L) 2017 05:48 AM    HCT 28.1 (L) 2017 05:48 AM    MCV 92.2 2017 06:15 AM    MCH 30.8 2017 06:15 AM    MCHC 33.4 2017 06:15 AM    RDW 14.4 2017 06:15 AM    Lab Results   Component Value Date/Time    MONOS 7 2017 06:15 AM    EOS 4 2017 06:15 AM    BASOS 0 2017 06:15 AM    RDW 14.4 2017 06:15 AM              Condition at discharge: Afebrile  Ambulating  Eating, Drinking, Voiding  Stable    Current Discharge Medication List      START taking these medications    Details   enoxaparin (LOVENOX) 40 mg/0.4 mL 0.4 mL by SubCUTAneous route every twenty-four (24) hours for 14 days. Indications: DEEP VEIN THROMBOSIS PREVENTION  Qty: 5.6 mL, Refills: 0      polyethylene glycol (MIRALAX) 17 gram packet Take 1 Packet by mouth daily for 12 days. While taking pain medications to help prevent constipation  Qty: 12 Packet, Refills: 0      oxyCODONE-acetaminophen (PERCOCET) 7.5-325 mg per tablet Take 1-2 Tabs by mouth every four (4) hours as needed for Pain. Max Daily Amount: 12 Tabs. Qty: 60 Tab, Refills: 0      naloxone (NARCAN) 4 mg/actuation spry Use 1 spray intranasally into 1 nostril. Use a new Narcan nasal spray for subsequent doses and administer into alternating nostrils. May repeat every 2 to 3 minutes as needed until ambulance arrives. Indications: OPIATE-INDUCED RESPIRATORY DEPRESSION, OPIOID TOXICITY  Qty: 1 Package, Refills: 0         CONTINUE these medications which have NOT CHANGED    Details   FLUoxetine (PROZAC) 40 mg capsule Take 40 mg by mouth daily. fluticasone (FLONASE) 50 mcg/actuation nasal spray daily. QUEtiapine (SEROQUEL) 50 mg tablet Take 50 mg by mouth nightly. albuterol (VENTOLIN HFA) 90 mcg/actuation inhaler Take 2 Puffs by inhalation every four (4) hours as needed for Wheezing or Shortness of Breath. Indications: Acute Asthma Attack      diazepam (VALIUM) 10 mg tablet Take 10 mg by mouth three (3) times daily as needed for Anxiety. Indications: ANXIETY      amLODIPine (NORVASC) 5 mg tablet Take 5 mg by mouth daily. Indications: hypertension      ranitidine (ZANTAC) 150 mg tablet Take 150 mg by mouth two (2) times a day. Indications: PREVENTION OF STRESS ULCER      metFORMIN (GLUCOPHAGE) 1,000 mg tablet Take 1,000 mg by mouth two (2) times daily (with meals). Indications: type 2 diabetes mellitus      simvastatin (ZOCOR) 20 mg tablet Take 20 mg by mouth nightly. Indications: DYSLIPIDEMIA      theophylline ER,12 hour, (THEOCHRON) 300 mg tablet Take 300 mg by mouth two (2) times a day.  Indications: BRONCHIAL ASTHMA allopurinol (ZYLOPRIM) 300 mg tablet Take 300 mg by mouth daily. Indications: GOUT      diphenhydrAMINE (BENADRYL) 25 mg capsule Take 25 mg by mouth every six (6) hours as needed for Skin Irritation. Indications: PRURITUS OF SKIN      nitroglycerin (NITROSTAT) 0.4 mg SL tablet 0.4 mg by SubLINGual route every five (5) minutes as needed for Chest Pain.      multivitamin, tx-iron-ca-min (THERA-M W/ IRON) 9 mg iron-400 mcg tab tablet Take 1 Tab by mouth daily. Indications: VITAMIN DEFICIENCY PREVENTION                   PCP:  Ren Robert NP        Disposition:  Clear for discharge to home, if clear by medicine service and after PT. Follow-up in the office in 1 month with Dr. Esau Gitelman; call [phone] to schedule appointment. Wound Care: Staples out POD #10    DVT prophylaxis:  Lovenox for 14 days.     Weightbearing Status: WBAT with magy Mcfadden PA-C  7/26/2017  Office: [phone]

## 2017-07-26 NOTE — PROGRESS NOTES
Patient will need home care orders for Skilled nursing and Physical therapy, will leave an FYI for the physician. I am currently locked out of EPAS and have contacted state Medicaid in an effort to reset and allow entrance to determine if the patients personal care screening was accepted.  Sabra Mayo RN

## 2017-07-26 NOTE — PROGRESS NOTES
Problem: Self Care Deficits Care Plan (Adult)  Goal: *Acute Goals and Plan of Care (Insert Text)  Occupational Therapy Goals  Initiated 7/25/2017 within 7 day(s). 1. Patient will perform grooming tasks at EOB with modified independence. 2. Patient will perform lower body dressing with modified independence utilizing AE, prn.  3. Patient will perform functional task in standing for 3 minutes with minimal assistance to increase activity tolerance for ADLs. 4. Patient will perform toilet transfers with minimal assistance/contact guard assist.  5. Patient will perform all aspects of toileting with minimal assistance/contact guard assist.  6. Patient will participate in upper extremity therapeutic exercise/activities with supervision/set-up for 8 minutes to increase BUE strength for ADLs. 7. Patient will utilize energy conservation techniques during functional activities with minimal verbal cues. Outcome: Progressing Towards Goal  OCCUPATIONAL THERAPY TREATMENT     Patient: Chirag Hernández (28 y.o. female)  Date: 7/26/2017  Diagnosis: osteoarthritis m17.11  Osteoarthritis of right knee Osteoarthritis of right knee  Procedure(s) (LRB):  right total knee replacement (Right) 2 Days Post-Op  Precautions: Fall, WBAT  Chart, occupational therapy assessment, plan of care, and goals were reviewed. ASSESSMENT:  Pt is pleasant and cooperative, however, pt c/o pain cont to limit pt participation w/therapy. Pt requires increase time w/bed mobility and Min Assist to EOB using \"hooking\" technique w/RLE. Pt requires vc's for breathing 2/2 holding breath and vc's for hand placement w/functional transfer to chair. Pt left in chair for breakfast. Reports she has adaptive equipment from [facility] and familiarity w/LB dressing technique.   EDUCATION Pt educated on importance OOB and encouraged OOB for all meals  Progression toward goals:  [X]          Improving appropriately and progressing toward goals  [ ]          Improving slowly and progressing toward goals  [ ]          Not making progress toward goals and plan of care will be adjusted       PLAN:  Patient continues to benefit from skilled intervention to address the above impairments. Continue treatment per established plan of care. Discharge Recommendations:  Home Health w/assist vs [address] Recommendations for Discharge:  N/A, pt reports she has DME       SUBJECTIVE:   Patient stated This knee is so much worse than my hip.       OBJECTIVE DATA SUMMARY:         Cognitive/Behavioral Status:  Neurologic State: Alert  Orientation Level: Oriented X4  Cognition: Appropriate for age attention/concentration, Follows commands  Safety/Judgement: Fall prevention, Awareness of environment  Functional Mobility and Transfers for ADLs:              Bed Mobility:  Supine to Sit: Minimum assistance (w/HOB raised and SRs)              Transfers:  Sit to Stand: Contact guard assistance  Bed to Chair: Contact guard assistance; Additional time w/RW  Balance:  Sitting: Intact  Sitting - Static: Good (unsupported)  Sitting - Dynamic: Fair (occasional)  Standing: Impaired; With support  Standing - Static: Fair  Standing - Dynamic : Fair  ADL Intervention:  Grooming  Washing Face: Supervision/set-up  Washing Hands: Supervision/set-up     Pain:  Pre Treatment:7  Post Treatment:6  Pain Scale 1: Numeric (0 - 10)  Pain Intensity 1: 6  Pain Location 1: Knee  Pain Orientation 1: Right  Pain Description 1: Aching  Pain Intervention(s) 1: Repositioned     Activity Tolerance:    Fair 2/2/o R knee pain     Please refer to the flowsheet for vital signs taken during this treatment.   After treatment:   [X]  Patient left in no apparent distress sitting up in chair  [ ]  Patient left in no apparent distress in bed  [X]  Call bell left within reach  [X]  Nursing notified  [ ]  Caregiver present  [ ]  Bed alarm activated     EVERETT Alves  Time Calculation: 17 mins

## 2017-07-26 NOTE — PROGRESS NOTES
Pt OOB in chair. Pt plans for dc home today with Home Health. Pt has walker, bedside commode, and wheelchair at home. Pt states pain is controlled. Pt states if discharged she will need to be discharged by 2 pm for her ride to be able to pick her up. ASIA Ribera at bedside discussing plan of care. Pt to have PT prior to dc. Spoke with  regarding personal care aide. States she has not been locked out of system and unable to determine if personal care screen has been accepted yet.

## 2017-07-26 NOTE — PROGRESS NOTES
Offered the pt FOC for home care, she chose [facility]. Pt verified the contact information on the face sheet is correct. Referral sent to intake via [facility] and called to the [facility] for f/u. Pt reported she has a r/w and a bedside commode for home use.

## 2017-07-26 NOTE — PROGRESS NOTES
Corrections to [facility] competed and resubmitted # [id number] assessment ref.   Dexter Khoury RN

## 2017-07-26 NOTE — PROGRESS NOTES
Care Management Interventions  PCP Verified by CM: Yes  Mode of Transport at Discharge:  Other (see comment)  Transition of Care Consult (CM Consult): [address]: Yes  MyChart Signup: No  Discharge Durable Medical Equipment: No  Physical Therapy Consult: Yes  Occupational Therapy Consult: Yes  Speech Therapy Consult: No  Current Support Network: Lives Alone  Confirm Follow Up Transport: Friends  Plan discussed with Pt/Family/Caregiver: Yes  Freedom of Choice Offered: Yes  Discharge Location  Discharge Placement: Home with home health

## 2017-07-26 NOTE — PROGRESS NOTES
Discharge instructions communicated to patient and family, verbalized understanding. Copy of instructions with prescriptions received, all personal belongings at side. Discharged home with family, left in no acute distress. All questions answered.

## 2017-07-26 NOTE — DISCHARGE INSTRUCTIONS
DISCHARGE SUMMARY from Nurse    The following personal items are in your possession at time of discharge:    Dental Appliances: None  Visual Aid: Glasses, With patient     Home Medications: None  Jewelry: None  Clothing: None  Other Valuables: None             PATIENT INSTRUCTIONS:    After general anesthesia or intravenous sedation, for 24 hours or while taking prescription Narcotics:  · Limit your activities  · Do not drive and operate hazardous machinery  · Do not make important personal or business decisions  · Do  not drink alcoholic beverages  · If you have not urinated within 8 hours after discharge, please contact your surgeon on call. Report the following to your surgeon:  · Excessive pain, swelling, redness or odor of or around the surgical area  · Temperature over 100.5  · Nausea and vomiting lasting longer than 4 hours or if unable to take medications  · Any signs of decreased circulation or nerve impairment to extremity: change in color, persistent  numbness, tingling, coldness or increase pain  · Any questions        What to do at Home:  Recommended activity: Activity as tolerated and no driving for today. If you experience any of the following symptoms severe pain, fever above 100.5, bleeding or drainage from incision, new numbness or tinging, please follow up with Dr. David Calle. *  Please give a list of your current medications to your Primary Care Provider. *  Please update this list whenever your medications are discontinued, doses are      changed, or new medications (including over-the-counter products) are added. *  Please carry medication information at all times in case of emergency situations. These are general instructions for a healthy lifestyle:    No smoking/ No tobacco products/ Avoid exposure to second hand smoke    Surgeon General's Warning:  Quitting smoking now greatly reduces serious risk to your health.     Obesity, smoking, and sedentary lifestyle greatly increases your risk for illness    A healthy diet, regular physical exercise & weight monitoring are important for maintaining a healthy lifestyle    You may be retaining fluid if you have a history of heart failure or if you experience any of the following symptoms:  Weight gain of 3 pounds or more overnight or 5 pounds in a week, increased swelling in our hands or feet or shortness of breath while lying flat in bed. Please call your doctor as soon as you notice any of these symptoms; do not wait until your next office visit. Recognize signs and symptoms of STROKE:    F-face looks uneven    A-arms unable to move or move unevenly    S-speech slurred or non-existent    T-time-call 911 as soon as signs and symptoms begin-DO NOT go       Back to bed or wait to see if you get better-TIME IS BRAIN. Warning Signs of HEART ATTACK     Call 911 if you have these symptoms:   Chest discomfort. Most heart attacks involve discomfort in the center of the chest that lasts more than a few minutes, or that goes away and comes back. It can feel like uncomfortable pressure, squeezing, fullness, or pain.  Discomfort in other areas of the upper body. Symptoms can include pain or discomfort in one or both arms, the back, neck, jaw, or stomach.  Shortness of breath with or without chest discomfort.  Other signs may include breaking out in a cold sweat, nausea, or lightheadedness. Don't wait more than five minutes to call 911 - MINUTES MATTER! Fast action can save your life. Calling 911 is almost always the fastest way to get lifesaving treatment. Emergency Medical Services staff can begin treatment when they arrive -- up to an hour sooner than if someone gets to the hospital by car. The discharge information has been reviewed with the patient. The patient verbalized understanding. Discharge medications reviewed with the patient and appropriate educational materials and side effects teaching were provided.   Patient armband removed and shredded.

## 2017-07-26 NOTE — PROGRESS NOTES
6792 Received bedside report from Lucy Carvalho RN, updated white board, call bell is within reach. 3452 Patient given pain medication at this time. Call bell is within reach. 0830 Patient is in the chair at this time for breakfast at this time. She is doing well at this time. Call bell is within reach. 1030 Patient is back in the bed at this time. Call bell is within reach. Family members are at the bedside. 1230 Patient walked in the hallway with PT and up the stairs at this time. She was able to tolerated the walking well at this time. Informed the patient she can call for her ride home. 1330 Patient discharge home at this time with all her personal items.

## 2017-07-26 NOTE — PROGRESS NOTES
Patients screening for personal care has been denied. She has been informed of the denial. She states she has been denied in the past for personal care services. If accepted she would have chosen [facility]. [phone]. She has home care orders for nursing and physical therapy. She states her friend will provide transportation home. He discharge plan is to return home with home care today.   Rosalva Tejeda RN

## 2017-07-26 NOTE — PROGRESS NOTES
Problem: Mobility Impaired (Adult and Pediatric)  Goal: *Acute Goals and Plan of Care (Insert Text)  PHYSICAL THERAPY SHORT TERM GOALS : All goals to be met in 7 days with R TKR WBAT precautions  1. Patient will perform/complete bedside mobility supine <-> sit with modified independence . 2. Patient will perform/complete bedside transfers EOB <-> chair with modified independence . 3. Patient will perform/complete bedside transfer sit <-> stand with modified independence. 4. Patient will perform/complete gait training 100 with the least restrictive device with modified independence . 5. Patient will perform /complete stair training 3 steps , up <-> down with the least restrictive device modified independence . 6. Patient will participate in lower extremity therapeutic exercise/activities with modified independence for 15 minutes . Therapist Ozzie Huggins 7/24/2017  Time Calculation: 26 mins   Outcome: Resolved/Met Date Met:  07/26/17  PHYSICAL THERAPY TREATMENT     Patient: Britany Guzman (37 y.o. female)  Date: 7/26/2017  Diagnosis: osteoarthritis m17.11  Osteoarthritis of right knee Osteoarthritis of right knee  Procedure(s) (LRB):  right total knee replacement (Right) 2 Days Post-Op  Precautions: Fall, WBAT  Chart, physical therapy assessment, plan of care and goals were reviewed. ASSESSMENT:  Pt partially met all goals however did not achieve mod IND w mobility prior to discharge 2/2 pain limiting mobility and participation with therapies. Returned after receiving PM pain meds and pt agreeing to attempt amb and stairs in prep for dc to home. AMb 50 ft with good safety but fatigues quickly. UP <>down 3 stairs w rails and CG and SBA of a 2nd person to maximize safety.   EDUCATION: stairs, gait sequence>verbalzied, hand palcement during transfer  Progression toward goals:        Improving slowly and progressing toward goals       PLAN:  Patient continues to benefit from skilled intervention to address the above impairments. Continue treatment per established plan of care. Discharge Recommendations: pt is being dc'd home w [facility]; would benefit from snf however no benefit. Further Equipment Recommendations for Discharge:none has all needed equipment. SUBJECTIVE:   Patient stated .      OBJECTIVE DATA SUMMARY:   Critical Behavior:  Neurologic State: Alert, Appropriate for age  Orientation Level: Oriented X4  Cognition: Appropriate decision making, Appropriate for age attention/concentration, Appropriate safety awareness  Safety/Judgement: Fall prevention, Awareness of environment     G CODE:na  Functional Mobility Training:  Bed Mobility:  Supine to Sit: Contact guard assistance  Sit to Supine: Minimum assistance (for R LE)              Interventions: Tactile cues; Verbal cues  Transfers:  Sit to Stand: Minimum assistance (from low wc)  Stand to Sit: Supervision;Contact guard assistance (VC to use UE's to lower; extend affected LE)     Stand Pivot Transfers: Contact guard assistance  Bed to Chair: Supervision;Contact guard assistance  Interventions: Safety awareness training; Tactile cues; Verbal cues  Balance:  Sitting: Intact  Sitting - Static: Good (unsupported)  Sitting - Dynamic: Fair (occasional)  Standing: With support  Standing - Static: Good  Standing - Dynamic : Fair (plus)  Ambulation/Gait Training:  Distance (ft): 50 Feet (ft)  Assistive Device: Gait belt;Walker, rolling  Ambulation - Level of Assistance: Contact guard assistance  Right Side Weight Bearing: As tolerated  Base of Support: Center of gravity altered; Widened  Stance: Right decreased  Speed/Cris: Slow  Step Length: Left shortened;Right shortened  Swing Pattern: Right asymmetrical  Interventions: Safety awareness training; Tactile cues; Verbal cues  Stairs:  Number of Stairs Trained: 3  Stairs - Level of Assistance: Contact guard assistance  Rail Use:  (one & folded walker)  Vital Signs  Temp: 98 °F (36.7 °C) Pulse (Heart Rate): 96     BP: 130/71     Resp Rate: 18     O2 Sat (%): 94 % (post activity)  Pain:  Pre treatment pain level: 5  Post treatment pain level:  5  Pain Scale 1: Numeric (0 - 10)  Pain Intensity 1: 5  Pain Location 1: Knee  Pain Orientation 1: Right  Pain Description 1: Aching  Pain Intervention(s) 1: Medication (see MAR)  Activity Tolerance:   poor  After treatment:   Patient left in no apparent distress in bed  Call bell left within reach  Nursing, Mahesh Thompson  notified      Rafael Cabral PTA   Time Calculation: 28 mins

## 2017-07-26 NOTE — PROGRESS NOTES
Internal Medicine Progress Note    Patient's Name: Cordell Tan Date: 7/24/2017  Length of Stay: 2      Assessment/Plan     Active Hospital Problems    Diagnosis Date Noted    Osteoarthritis of right knee 07/24/2017    Gout     History of tobacco abuse     Essential hypertension     Bronchial asthma     Dyslipidemia     Type 2 diabetes mellitus without complication (HCC)      - PT/OT  - Pain control PRN  - Bowel regimen  - BP in good control  - Cont acceptable home medications for chronic conditions   - DVT protocol    I have personally reviewed all pertinent labs and films that have officially resulted over the last 24 hours. I have personally checked for all pending labs that are awaiting final results. Subjective     Pt s/e @ bedside  No major events overnight  Pain better controlled today  Denies CP or SOB    Objective     Visit Vitals    /71 (BP Patient Position: Post activity; Sitting)    Pulse 96    Temp 98 °F (36.7 °C)    Resp 18    Ht 5' 3\" (1.6 m)    Wt 99.3 kg (219 lb)    SpO2 94%    BMI 38.79 kg/m2       Physical Exam:  General Appearance: NAD, conversant  Lungs: CTA with normal respiratory effort  CV: RRR, no m/r/g  Abdomen: soft, non-tender, normal bowel sounds  Extremities: no cyanosis, no peripheral edema, dressing in place R knee  Neuro: No focal deficits, motor/sensory intact    Lab/Data Reviewed:  BMP:   Lab Results   Component Value Date/Time     07/26/2017 05:48 AM    K 4.0 07/26/2017 05:48 AM     07/26/2017 05:48 AM    CO2 24 07/26/2017 05:48 AM    AGAP 8 07/26/2017 05:48 AM     (H) 07/26/2017 05:48 AM    BUN 14 07/26/2017 05:48 AM    CREA 0.77 07/26/2017 05:48 AM    GFRAA >60 07/26/2017 05:48 AM    GFRNA >60 07/26/2017 05:48 AM     CBC:   Lab Results   Component Value Date/Time    HGB 9.1 (L) 07/26/2017 05:48 AM    HCT 28.1 (L) 07/26/2017 05:48 AM       Imaging Reviewed:  No results found.     Medications Reviewed:  Current Facility-Administered Medications   Medication Dose Route Frequency    oxyCODONE IR (ROXICODONE) tablet 10-15 mg  10-15 mg Oral Q4H PRN    acetaminophen (TYLENOL) tablet 650 mg  650 mg Oral Q6H    polyethylene glycol (MIRALAX) packet 17 g  17 g Oral DAILY    celecoxib (CELEBREX) capsule 100 mg  100 mg Oral BID    allopurinol (ZYLOPRIM) tablet 300 mg  300 mg Oral DAILY    amLODIPine (NORVASC) tablet 5 mg  5 mg Oral DAILY    FLUoxetine (PROzac) capsule 40 mg  40 mg Oral DAILY    multivitamin, tx-iron-ca-min (THERA-M w/ IRON) tablet 1 Tab  1 Tab Oral DAILY    nitroglycerin (NITROSTAT) tablet 0.4 mg  0.4 mg SubLINGual Q5MIN PRN    QUEtiapine (SEROquel) tablet 50 mg  50 mg Oral QHS    famotidine (PEPCID) tablet 20 mg  20 mg Oral BID    simvastatin (ZOCOR) tablet 20 mg  20 mg Oral QHS    theophylline ER(12 hour) (THEOCHRON) tablet 300 mg (Patient's Own Medication)  300 mg Oral BID    sodium chloride (NS) flush 5-10 mL  5-10 mL IntraVENous Q8H    sodium chloride (NS) flush 5-10 mL  5-10 mL IntraVENous PRN    naloxone (NARCAN) injection 0.4 mg  0.4 mg IntraVENous PRN    ondansetron (ZOFRAN) injection 4 mg  4 mg IntraVENous Q4H PRN    diphenhydrAMINE (BENADRYL) injection 12.5 mg  12.5 mg IntraVENous Q4H PRN    diphenhydrAMINE (BENADRYL) capsule 25 mg  25 mg Oral Q4H PRN    docusate sodium (COLACE) capsule 100 mg  100 mg Oral BID    enoxaparin (LOVENOX) injection 40 mg  40 mg SubCUTAneous Q24H    morphine injection 2 mg  2 mg IntraVENous Q2H PRN    albuterol (PROVENTIL VENTOLIN) nebulizer solution 2.5 mg  2.5 mg Nebulization Q4H PRN    insulin lispro (HUMALOG) injection   SubCUTAneous AC&HS    glucose chewable tablet 16 g  4 Tab Oral PRN    glucagon (GLUCAGEN) injection 1 mg  1 mg IntraMUSCular PRN    dextrose (D50W) injection syrg 12.5-25 g  25-50 mL IntraVENous PRN    arformoterol (BROVANA) neb solution 15 mcg  15 mcg Nebulization BID RT    budesonide (PULMICORT) 500 mcg/2 ml nebulizer suspension  500 mcg Nebulization BID RT           Sung Bolaños DO  Internal Medicine, Hospitalist  Pager: [phone]  [address] [facility]

## 2017-07-26 NOTE — PROGRESS NOTES
Problem: Mobility Impaired (Adult and Pediatric)  Goal: *Acute Goals and Plan of Care (Insert Text)  PHYSICAL THERAPY SHORT TERM GOALS : All goals to be met in 7 days with R TKR WBAT precautions  1. Patient will perform/complete bedside mobility supine <-> sit with modified independence . 2. Patient will perform/complete bedside transfers EOB <-> chair with modified independence . 3. Patient will perform/complete bedside transfer sit <-> stand with modified independence. 4. Patient will perform/complete gait training 100 with the least restrictive device with modified independence . 5. Patient will perform /complete stair training 3 steps , up <-> down with the least restrictive device modified independence . 6. Patient will participate in lower extremity therapeutic exercise/activities with modified independence for 15 minutes . Therapist Jesse Pennington 7/24/2017  Time Calculation: 26 mins   Outcome: Progressing Towards Goal  PHYSICAL THERAPY TREATMENT     Patient: Mackenzie Byers (72 y.o. female)  Date: 7/26/2017  Diagnosis: osteoarthritis m17.11  Osteoarthritis of right knee Osteoarthritis of right knee  Procedure(s) (LRB):  right total knee replacement (Right) 2 Days Post-Op  Precautions: Fall, WBAT  Chart, physical therapy assessment, plan of care and goals were reviewed. ASSESSMENT:  Pt presents up in chair. SOB with bathing w nsg asst. Educated breathing and encouraged carryover with this during exercise. Lengthen exhale. Received pain meds around 8Am; now reports 8/10 and can get pain meds in about a half hour. Agreed to LE ex., and transfer and will f/u for gait/stairs after pain meds and rest. Pt agreeable. Stating she can absolutely not ambulate at this time. Provided visual/verbal demo for technique for gait and with cues able to take steps and  Use UE's to control weight bearing R LE and thus pain. EDUCATION:  LE ex., breathing, self assist for LE onto bed.   Progression toward goals:           Improving slowly and progressing toward goals       PLAN:  Patient continues to benefit from skilled intervention to address the above impairments. Continue treatment per established plan of care. Discharge Recommendations:feel would benefit from snf however no insurance and will return home  Further Equipment Recommendations for Discharge:  has walker and WC       SUBJECTIVE:   Patient stated .      OBJECTIVE DATA SUMMARY:   Critical Behavior:  Neurologic State: Alert  Orientation Level: Oriented X4  Cognition: Appropriate for age attention/concentration, Follows commands  Safety/Judgement: Fall prevention, Awareness of environment     G CODE:na  Functional Mobility Training:  Bed Mobility:  Supine to Sit: Minimum assistance (w/HOB raised and SRs)  Sit to Supine: Contact guard assistance;Minimum assistance; Additional time                Interventions: Verbal cues  Transfers:  Sit to Stand: Supervision  Stand to Sit: Supervision  Bed to Chair: Supervision;Contact guard assistance; Additional time  Interventions: Safety awareness training;Verbal cues  Balance:  Sitting: Intact  Sitting - Static: Good (unsupported)  Sitting - Dynamic: Fair (occasional)  Standing: With support  Standing - Static: Good;Fair  Standing - Dynamic : Fair (plus)  Ambulation/Gait Training:  Distance (ft): 10 Feet (ft)  Assistive Device: Walker, rolling  Ambulation - Level of Assistance: Contact guard assistance  Base of Support: Center of gravity altered; Widened  Stance: Right decreased  Speed/Cris: Slow  Step Length: Left shortened;Right shortened  Swing Pattern: Right asymmetrical  Interventions: Safety awareness training; Tactile cues; Verbal cues  Therapeutic Exercises:   Seated ankle DF/OPF x 10; LAQ through half range and seated hip F x 10; supine R AP w/3 sec hold and quad set x 10 w 3 sec hold.   Vital Signs  Temp: 97.9 °F (36.6 °C)     Pulse (Heart Rate): 86     BP: 129/76     Resp Rate: 20     O2 Sat (%): 93 %  Pain: received pain meds around 8Am; now repotrs 8/10 and can get pain meds in about a half hour.   Pre treatment pain level:  8  Post treatment pain level:  8  Pain Scale 1: Numeric (0 - 10)  Pain Location 1: Knee  Pain Orientation 1: Right  Pain Description 1: Aching  Pain Intervention(s) 1: Repositioned  Activity Tolerance:   poor     After treatment:   Patient left in no apparent distress in bed  Call bell left within reach  Nursing notified      Isabel Kessler PTA   Time Calculation: 25 mins

## 2017-07-27 ENCOUNTER — HOME CARE VISIT (OUTPATIENT)
Dept: SCHEDULING | Facility: HOME HEALTH | Age: 62
End: 2017-07-27
Payer: MEDICAID

## 2017-07-27 VITALS
HEART RATE: 98 BPM | OXYGEN SATURATION: 96 % | DIASTOLIC BLOOD PRESSURE: 80 MMHG | TEMPERATURE: 97.5 F | SYSTOLIC BLOOD PRESSURE: 132 MMHG

## 2017-07-27 PROCEDURE — G0151 HHCP-SERV OF PT,EA 15 MIN: HCPCS

## 2017-07-27 PROCEDURE — G0495 RN CARE TRAIN/EDU IN HH: HCPCS

## 2017-07-27 PROCEDURE — 400013 HH SOC

## 2017-07-28 ENCOUNTER — HOME CARE VISIT (OUTPATIENT)
Dept: SCHEDULING | Facility: HOME HEALTH | Age: 62
End: 2017-07-28
Payer: MEDICAID

## 2017-07-28 ENCOUNTER — HOME CARE VISIT (OUTPATIENT)
Dept: HOME HEALTH SERVICES | Facility: HOME HEALTH | Age: 62
End: 2017-07-28
Payer: MEDICAID

## 2017-07-28 PROCEDURE — G0157 HHC PT ASSISTANT EA 15: HCPCS

## 2017-07-28 NOTE — PROGRESS NOTES
[facility] successfully processed and faxed to [facility] [id number] as requested by the patient. I called Ms Ayaka Rinaldi and informed her of the successful processing and forwarding.  Sunshine Butler RN

## 2017-07-29 ENCOUNTER — HOME CARE VISIT (OUTPATIENT)
Dept: SCHEDULING | Facility: HOME HEALTH | Age: 62
End: 2017-07-29
Payer: MEDICAID

## 2017-07-29 VITALS
OXYGEN SATURATION: 96 % | HEART RATE: 90 BPM | SYSTOLIC BLOOD PRESSURE: 112 MMHG | TEMPERATURE: 99 F | DIASTOLIC BLOOD PRESSURE: 60 MMHG

## 2017-07-29 PROCEDURE — G0157 HHC PT ASSISTANT EA 15: HCPCS

## 2017-07-29 PROCEDURE — G0299 HHS/HOSPICE OF RN EA 15 MIN: HCPCS

## 2017-07-30 VITALS — HEART RATE: 78 BPM | SYSTOLIC BLOOD PRESSURE: 134 MMHG | DIASTOLIC BLOOD PRESSURE: 72 MMHG | OXYGEN SATURATION: 97 %

## 2017-07-31 ENCOUNTER — HOME CARE VISIT (OUTPATIENT)
Dept: SCHEDULING | Facility: HOME HEALTH | Age: 62
End: 2017-07-31
Payer: MEDICAID

## 2017-07-31 VITALS — OXYGEN SATURATION: 97 % | SYSTOLIC BLOOD PRESSURE: 132 MMHG | DIASTOLIC BLOOD PRESSURE: 60 MMHG | HEART RATE: 93 BPM

## 2017-07-31 PROCEDURE — G0157 HHC PT ASSISTANT EA 15: HCPCS

## 2017-07-31 PROCEDURE — G0152 HHCP-SERV OF OT,EA 15 MIN: HCPCS

## 2017-08-01 ENCOUNTER — HOME CARE VISIT (OUTPATIENT)
Dept: SCHEDULING | Facility: HOME HEALTH | Age: 62
End: 2017-08-01
Payer: MEDICAID

## 2017-08-01 VITALS
HEART RATE: 76 BPM | RESPIRATION RATE: 20 BRPM | SYSTOLIC BLOOD PRESSURE: 132 MMHG | TEMPERATURE: 97.2 F | OXYGEN SATURATION: 98 % | DIASTOLIC BLOOD PRESSURE: 72 MMHG

## 2017-08-01 VITALS — OXYGEN SATURATION: 97 % | DIASTOLIC BLOOD PRESSURE: 70 MMHG | HEART RATE: 93 BPM | SYSTOLIC BLOOD PRESSURE: 132 MMHG

## 2017-08-01 VITALS
SYSTOLIC BLOOD PRESSURE: 130 MMHG | HEART RATE: 89 BPM | OXYGEN SATURATION: 98 % | DIASTOLIC BLOOD PRESSURE: 72 MMHG | RESPIRATION RATE: 16 BRPM

## 2017-08-01 PROCEDURE — G0300 HHS/HOSPICE OF LPN EA 15 MIN: HCPCS

## 2017-08-01 PROCEDURE — G0157 HHC PT ASSISTANT EA 15: HCPCS

## 2017-08-02 ENCOUNTER — HOME CARE VISIT (OUTPATIENT)
Dept: SCHEDULING | Facility: HOME HEALTH | Age: 62
End: 2017-08-02
Payer: MEDICAID

## 2017-08-02 VITALS — OXYGEN SATURATION: 98 % | HEART RATE: 94 BPM | DIASTOLIC BLOOD PRESSURE: 70 MMHG | SYSTOLIC BLOOD PRESSURE: 120 MMHG

## 2017-08-02 VITALS — DIASTOLIC BLOOD PRESSURE: 84 MMHG | HEART RATE: 82 BPM | OXYGEN SATURATION: 96 % | SYSTOLIC BLOOD PRESSURE: 134 MMHG

## 2017-08-02 PROCEDURE — G0157 HHC PT ASSISTANT EA 15: HCPCS

## 2017-08-03 ENCOUNTER — HOME CARE VISIT (OUTPATIENT)
Dept: SCHEDULING | Facility: HOME HEALTH | Age: 62
End: 2017-08-03
Payer: MEDICAID

## 2017-08-03 VITALS
HEART RATE: 92 BPM | TEMPERATURE: 97.4 F | SYSTOLIC BLOOD PRESSURE: 142 MMHG | DIASTOLIC BLOOD PRESSURE: 80 MMHG | RESPIRATION RATE: 20 BRPM | OXYGEN SATURATION: 97 %

## 2017-08-03 PROCEDURE — G0157 HHC PT ASSISTANT EA 15: HCPCS

## 2017-08-03 PROCEDURE — G0300 HHS/HOSPICE OF LPN EA 15 MIN: HCPCS

## 2017-08-04 ENCOUNTER — HOME CARE VISIT (OUTPATIENT)
Dept: SCHEDULING | Facility: HOME HEALTH | Age: 62
End: 2017-08-04
Payer: MEDICAID

## 2017-08-04 VITALS — SYSTOLIC BLOOD PRESSURE: 118 MMHG | DIASTOLIC BLOOD PRESSURE: 66 MMHG | HEART RATE: 81 BPM | OXYGEN SATURATION: 98 %

## 2017-08-04 PROCEDURE — G0157 HHC PT ASSISTANT EA 15: HCPCS

## 2017-08-06 VITALS — HEART RATE: 87 BPM | DIASTOLIC BLOOD PRESSURE: 64 MMHG | OXYGEN SATURATION: 98 % | SYSTOLIC BLOOD PRESSURE: 124 MMHG

## 2017-08-07 ENCOUNTER — HOME CARE VISIT (OUTPATIENT)
Dept: SCHEDULING | Facility: HOME HEALTH | Age: 62
End: 2017-08-07
Payer: MEDICAID

## 2017-08-07 PROCEDURE — G0157 HHC PT ASSISTANT EA 15: HCPCS

## 2017-08-08 ENCOUNTER — HOME CARE VISIT (OUTPATIENT)
Dept: SCHEDULING | Facility: HOME HEALTH | Age: 62
End: 2017-08-08
Payer: MEDICAID

## 2017-08-08 VITALS
RESPIRATION RATE: 20 BRPM | DIASTOLIC BLOOD PRESSURE: 84 MMHG | HEART RATE: 92 BPM | SYSTOLIC BLOOD PRESSURE: 148 MMHG | TEMPERATURE: 97.1 F | OXYGEN SATURATION: 97 %

## 2017-08-08 VITALS — HEART RATE: 82 BPM | OXYGEN SATURATION: 98 % | SYSTOLIC BLOOD PRESSURE: 152 MMHG | DIASTOLIC BLOOD PRESSURE: 82 MMHG

## 2017-08-08 PROCEDURE — G0157 HHC PT ASSISTANT EA 15: HCPCS

## 2017-08-08 PROCEDURE — G0494 LPN CARE EA 15MIN HH/HOSPICE: HCPCS

## 2017-08-09 ENCOUNTER — HOME CARE VISIT (OUTPATIENT)
Dept: SCHEDULING | Facility: HOME HEALTH | Age: 62
End: 2017-08-09
Payer: MEDICAID

## 2017-08-09 VITALS
TEMPERATURE: 97.7 F | DIASTOLIC BLOOD PRESSURE: 80 MMHG | OXYGEN SATURATION: 93 % | HEART RATE: 92 BPM | SYSTOLIC BLOOD PRESSURE: 158 MMHG

## 2017-08-09 PROCEDURE — G0151 HHCP-SERV OF PT,EA 15 MIN: HCPCS

## 2017-08-10 ENCOUNTER — HOME CARE VISIT (OUTPATIENT)
Dept: HOME HEALTH SERVICES | Facility: HOME HEALTH | Age: 62
End: 2017-08-10
Payer: MEDICAID

## 2017-08-11 ENCOUNTER — HOME CARE VISIT (OUTPATIENT)
Dept: SCHEDULING | Facility: HOME HEALTH | Age: 62
End: 2017-08-11
Payer: MEDICAID

## 2017-08-11 PROCEDURE — G0157 HHC PT ASSISTANT EA 15: HCPCS

## 2017-08-12 ENCOUNTER — HOME CARE VISIT (OUTPATIENT)
Dept: SCHEDULING | Facility: HOME HEALTH | Age: 62
End: 2017-08-12
Payer: MEDICAID

## 2017-08-12 VITALS — DIASTOLIC BLOOD PRESSURE: 79 MMHG | SYSTOLIC BLOOD PRESSURE: 155 MMHG

## 2017-08-12 PROCEDURE — G0157 HHC PT ASSISTANT EA 15: HCPCS

## 2017-08-14 ENCOUNTER — HOME CARE VISIT (OUTPATIENT)
Dept: SCHEDULING | Facility: HOME HEALTH | Age: 62
End: 2017-08-14
Payer: MEDICAID

## 2017-08-14 PROCEDURE — G0157 HHC PT ASSISTANT EA 15: HCPCS

## 2017-08-15 VITALS — HEART RATE: 87 BPM | OXYGEN SATURATION: 97 % | DIASTOLIC BLOOD PRESSURE: 60 MMHG | SYSTOLIC BLOOD PRESSURE: 106 MMHG

## 2017-08-16 ENCOUNTER — HOME CARE VISIT (OUTPATIENT)
Dept: SCHEDULING | Facility: HOME HEALTH | Age: 62
End: 2017-08-16
Payer: MEDICAID

## 2017-08-16 PROCEDURE — G0157 HHC PT ASSISTANT EA 15: HCPCS

## 2017-08-17 VITALS — SYSTOLIC BLOOD PRESSURE: 130 MMHG | HEART RATE: 111 BPM | OXYGEN SATURATION: 95 % | DIASTOLIC BLOOD PRESSURE: 62 MMHG

## 2017-08-18 ENCOUNTER — HOME CARE VISIT (OUTPATIENT)
Dept: SCHEDULING | Facility: HOME HEALTH | Age: 62
End: 2017-08-18
Payer: MEDICAID

## 2017-08-18 VITALS — OXYGEN SATURATION: 98 % | DIASTOLIC BLOOD PRESSURE: 68 MMHG | HEART RATE: 86 BPM | SYSTOLIC BLOOD PRESSURE: 120 MMHG

## 2017-08-18 PROCEDURE — G0151 HHCP-SERV OF PT,EA 15 MIN: HCPCS

## 2018-01-31 ENCOUNTER — HOSPITAL ENCOUNTER (OUTPATIENT)
Dept: GENERAL RADIOLOGY | Age: 63
Discharge: HOME | End: 2018-01-31
Payer: MEDICAID

## 2018-01-31 DIAGNOSIS — J44.9 CHRONIC OBSTRUCTIVE PULMONARY DISEASE (HCC): ICD-10-CM

## 2018-01-31 PROCEDURE — 71046 X-RAY EXAM CHEST 2 VIEWS: CPT

## 2018-03-28 ENCOUNTER — HOSPITAL ENCOUNTER (OUTPATIENT)
Dept: CT IMAGING | Age: 63
Discharge: HOME | End: 2018-03-28
Attending: NURSE PRACTITIONER
Payer: MEDICAID

## 2018-03-28 DIAGNOSIS — J47.1 BRONCHIECTASIS WITH (ACUTE) EXACERBATION (HCC): ICD-10-CM

## 2018-03-28 PROCEDURE — 71250 CT THORAX DX C-: CPT

## 2018-10-24 NOTE — PROGRESS NOTES
NUTRITION     Nutrition Consult: General Nutrition Management & Supplements      RECOMMENDATIONS / PLAN:     - No nutrition intervention indicated at this time. Will re-screen as appropriate. NUTRITION INTERVENTIONS & DIAGNOSIS:     Nutrition Diagnosis:  No nutrition diagnosis at this time    ASSESSMENT:     Subjective/Objective:  Patient reported appetite and po intake are good PTA and since admission. Consumed 100% of breakfast this morning. Denied having any concerns at time of visit  Current Appetite:   [x] Good     [] Fair     [] Poor     [] Other:  Appetite/meal intake prior to admission:   [x] Good     [] Fair     [] Poor     [] Other:    Diet: DIET DIABETIC CONSISTENT CARB Regular; AHA-LOW-CHOL FAT; No Conc. Sweets      Food Allergies:  None known   Feeding Limitations:  [] Swallowing difficulty    [] Chewing difficulty    [] Other:  Current Meal Intake: Patient Vitals for the past 100 hrs:   % Diet Eaten   02/09/17 0942 100 %     Average po intake adequate to meet patients estimated nutritional needs:   [x] Yes     [] No   [] Unable to determine at this time    BM:  2/7  Skin Integrity:  Left hip surgical incision  Edema:  None   Pertinent Medications: Reviewed    Recent Labs      02/09/17   0615  02/08/17   0425  02/07/17   0410   NA  140  140  139   K  3.7  3.7  4.1   CL  107  104  105   CO2  25  24  24   GLU  138*  149*  135*   BUN  25*  26*  20*   CREA  0.84  1.01  0.99   CA  9.1  9.2  9.0   MG  2.0   --    --    ALB   --   2.9*   --    SGOT   --   13*   --    ALT   --   11*   --        Intake/Output Summary (Last 24 hours) at 02/09/17 1122  Last data filed at 02/09/17 0942   Gross per 24 hour   Intake              180 ml   Output                0 ml   Net              180 ml       Anthropometrics:  Ht Readings from Last 1 Encounters:   02/09/17 5' 3\" (1.6 m)     Last 3 Recorded Weights in this Encounter    02/09/17 1118   Weight: 105.7 kg (233 lb)     Body mass index is 41.27 kg/(m^2). Weight History:   Patient denied experiencing any recent changes in weight PTA    Weight Metrics 2/9/2017 2/8/2017 2/6/2017 5/30/2016   Weight 233 lb - 228 lb 211 lb   BMI - 41.27 kg/m2 40.39 kg/m2 37.39 kg/m2        Admitting Diagnosis: Left hip fracture  Status post total replacement of left hip  Past Medical History   Diagnosis Date    Acute blood loss as cause of postoperative anemia 2/7/2017    Allergic rhinitis     Anxiety disorder     Bronchial asthma     Cataract of left eye     Chronic alcoholism (HCC)     Chronic pain syndrome     Decreased calculated glomerular filtration rate (GFR) 2/6/2017     Calculated GFR is equivalent to that of CKD stage 2 = 60-89 ml/min    Depression     Dyslipidemia     Essential hypertension     Gout     Incisional hernia     Lichen simplex chronicus     Obesity, Class III, BMI 40-49.9 (morbid obesity) ([facility] [address]     Paronychia     Primary osteoarthritis of left hip     Tobacco abuse     Type 2 diabetes mellitus without complication ([facility] [address]        Education Needs:        [x] None identified  [] Identified - Not appropriate at this time  []  Identified and addressed - refer to education log  Learning Limitations:   [x] None identified  [] Identified    Cultural, Christianity & ethnic food preferences:  [x] None identified    [] Identified and addressed     ESTIMATED NUTRITION NEEDS:     Calories: 8327-9101 kcal (MSJx1.2-1.3) based on  [x] Adjusted BW:  66 kg    [] IBW:   CHO: 179-194 gm (50% kcal)   Protein: 53-66 gm (0.8-1 gm/kg) based on  [x] Adjusted BW:  66 kg     [] IBW:   Fluid: 1 mL/kcal     MONITORING & EVALUATION:     Nutrition Goal(s):   1. Po intake of meals will meet >75% of patient estimated nutritional needs within the next 5-7 days.   Outcome:  [] Met/Ongoing    []  Not Met    [] New/Initial Goal     Monitoring:   [x] Monitor meal intake    [] Monitor diet tolerance     [] Monitor supplement intake    Previous Recommendations (for follow-up assessments only):     []   Implemented       []   Not Implemented (RD to address)     [] No Recommendation Made     Discharge Planning:  Diabetic diet   [x] Participated in care planning, discharge planning, & interdisciplinary rounds as appropriate      Donna Tan, [address] [place] [address]   Pager: [phone] Detail Level: Zone

## 2019-03-11 ENCOUNTER — HOSPITAL ENCOUNTER (OUTPATIENT)
Dept: CT IMAGING | Age: 64
Discharge: HOME | End: 2019-03-11
Attending: INTERNAL MEDICINE
Payer: MEDICAID

## 2019-03-11 DIAGNOSIS — J98.19 OTHER PULMONARY COLLAPSE: ICD-10-CM

## 2019-03-11 DIAGNOSIS — J98.11 ATELECTASIS: ICD-10-CM

## 2019-03-11 PROCEDURE — 71250 CT THORAX DX C-: CPT

## 2019-04-01 NOTE — PROGRESS NOTES
Care Management Interventions  PCP Verified by CM: Yes  Mode of Transport at Discharge:  Other (see comment)  Transition of Care Consult (CM Consult): [address]: Yes  MyChart Signup: No  Discharge Durable Medical Equipment: No  Physical Therapy Consult: Yes  Occupational Therapy Consult: Yes  Speech Therapy Consult: No  Current Support Network: Lives Alone  Confirm Follow Up Transport: Friends  Plan discussed with Pt/Family/Caregiver: Yes  Freedom of Choice Offered: Yes  Discharge Location  Discharge Placement: Home with home health Benefits, risks, and possible complications of procedure explained to patient/caregiver who verbalized understanding and gave written consent.

## (undated) DEVICE — TABLE COVER: Brand: CONVERTORS

## (undated) DEVICE — SMARTGOWN SURGICAL GOWN, 2XL: Brand: CONVERTORS

## (undated) DEVICE — GOWN,SIRUS,FABRNF,XL,20/CS: Brand: MEDLINE

## (undated) DEVICE — Device

## (undated) DEVICE — 3M™ IOBAN™ 2 ANTIMICROBIAL INCISE DRAPE 6651EZ: Brand: IOBAN™ 2

## (undated) DEVICE — DEPAUL TOTAL JOINT CDS: Brand: MEDLINE INDUSTRIES, INC.

## (undated) DEVICE — ABDOMINAL PAD: Brand: DERMACEA

## (undated) DEVICE — (D)SYR 10ML 1/5ML GRAD NSAF -- PKGING CHANGE USE ITEM 338027

## (undated) DEVICE — SOL IRRIGATION INJ NACL 0.9% 500ML BTL

## (undated) DEVICE — BOWL AND CEMENT CARTRIDGE WITH BREAKAWAY FEMORAL NOZZLE: Brand: ACM

## (undated) DEVICE — STRYKER PERFORMANCE SERIES SAGITTAL BLADE: Brand: STRYKER PERFORMANCE SERIES

## (undated) DEVICE — STAPLER SKIN H3.9MM WIRE DIA0.58MM CRWN 6.9MM 35 STPL FIX

## (undated) DEVICE — SOLUTION IRRIG 3000ML 0.9% SOD CHL FLX CONT 0797208] ICU MEDICAL INC]

## (undated) DEVICE — GOWN,SIRUS,POLYRNF,BRTHSLV,XL,30/CS: Brand: MEDLINE

## (undated) DEVICE — TRAY CATH 16F URIN MTR LTX -- CONVERT TO ITEM 363111

## (undated) DEVICE — STERILE POLYISOPRENE POWDER-FREE SURGICAL GLOVES: Brand: PROTEXIS

## (undated) DEVICE — SUTURE ABSORBABLE BRAIDED 2-0 CT-1 27 IN UD VICRYL J259H

## (undated) DEVICE — REM POLYHESIVE ADULT PATIENT RETURN ELECTRODE: Brand: VALLEYLAB

## (undated) DEVICE — SYR LR LCK 1ML GRAD NSAF 30ML --

## (undated) DEVICE — SUTURE VCRL SZ 0 L18IN ABSRB UD L36MM CT-1 1/2 CIR J840D

## (undated) DEVICE — DRSG GZ OIL EMUL CURAD 3X8 --

## (undated) DEVICE — DRAPE,HIP,W/POUCHES,STERILE: Brand: MEDLINE

## (undated) DEVICE — SOLUTION IV STRL H2O 500 ML AQUALITE POUR BTL

## (undated) DEVICE — TOWEL SURG W16XL26IN BLU NONFENESTRATED DLX ST 2 PER PK

## (undated) DEVICE — BLADE SAW W0.98XL3.54IN THK0.05IN CUT THK0.05IN SAG

## (undated) DEVICE — ZIMMER® STERILE DISPOSABLE TOURNIQUET CUFF WITH PLC, SINGLE PORT, SINGLE BLADDER, 34 IN. (86 CM)

## (undated) DEVICE — 2108 SERIES SAGITTAL BLADE (12.5 X 0.88 X 90.5MM)

## (undated) DEVICE — DISPOSABLE TOURNIQUET CUFF SINGLE BLADDER, SINGLE PORT AND QUICK CONNECT CONNECTOR: Brand: COLOR CUFF

## (undated) DEVICE — GAUZE SPONGES,12 PLY: Brand: CURITY

## (undated) DEVICE — INTENDED FOR TISSUE SEPARATION, AND OTHER PROCEDURES THAT REQUIRE A SHARP SURGICAL BLADE TO PUNCTURE OR CUT.: Brand: BARD-PARKER SAFETY BLADES SIZE 10, STERILE

## (undated) DEVICE — LIGHT HANDLE: Brand: DEVON

## (undated) DEVICE — SYRINGE MED 20ML STD CLR PLAS LUERLOCK TIP N CTRL DISP

## (undated) DEVICE — MEDI-VAC SUCTION HANDLE REGULAR CAPACITY: Brand: CARDINAL HEALTH

## (undated) DEVICE — PAD PREP ALCOHOL LG STERILE -- CONVERT TO ITEM 305014

## (undated) DEVICE — DECANTER VI C-FLO LF --

## (undated) DEVICE — KENDALL SCD EXPRESS SLEEVES, KNEE LENGTH, MEDIUM: Brand: KENDALL SCD

## (undated) DEVICE — Z DISCONTINUED USE 2219801 STAPLER SKIN REG CRWN L5.7MM LEG L3.9MM WIRE DIA0.53MM PROX

## (undated) DEVICE — SHEET,DRAPE,70X100,STERILE: Brand: MEDLINE

## (undated) DEVICE — NDL PRT INJ NSAF BLNT 18GX1.5 --

## (undated) DEVICE — SOLUTION IV 1000ML 0.9% SOD CHL

## (undated) DEVICE — HANDPIECE SET WITH SOFT TISSUE TIP AND SUCTION TUBE: Brand: INTERPULSE

## (undated) DEVICE — PREP SKN CHLRAPRP 26ML TNT -- CONVERT TO ITEM 373320

## (undated) DEVICE — 3M™ STERI-DRAPE™ U-DRAPE 1015: Brand: STERI-DRAPE™

## (undated) DEVICE — GOWN,REINFORCED,POLY,AURORA,XLARGE,STRL: Brand: MEDLINE

## (undated) DEVICE — SOL IRR SOD CL 0.9% 3000ML --

## (undated) DEVICE — SOFT TISSUE TIP

## (undated) DEVICE — BLADE SURG SAW SAG S STL AGG 90MM LEN 25MM W 1.27MM THCK

## (undated) DEVICE — STERILE LATEX POWDER-FREE SURGICAL GLOVESWITH NITRILE COATING: Brand: PROTEXIS

## (undated) DEVICE — (D)GLOVE SURG ORTH 8.5 PWD LTX -- DISC BY MFR USE ITEM 278016

## (undated) DEVICE — PILLOW ABD HIP UNIV MED FOAM --

## (undated) DEVICE — ABDUCTION PILLOW,MEDIUM: Brand: DEVON

## (undated) DEVICE — BLADE SURG SAW OSC SAG 90MM LEN 25MM W 1.19MM THCK AGG 90MM

## (undated) DEVICE — MAYO STAND COVER: Brand: CONVERTORS

## (undated) DEVICE — SUTURE VCRL SZ 0 L18IN ABSRB VLT L36MM CT-1 1/2 CIR J740D

## (undated) DEVICE — NDL SPNE QNCKE 18GX3.5IN LF --